# Patient Record
Sex: FEMALE | Race: WHITE | Employment: FULL TIME | URBAN - NONMETROPOLITAN AREA
[De-identification: names, ages, dates, MRNs, and addresses within clinical notes are randomized per-mention and may not be internally consistent; named-entity substitution may affect disease eponyms.]

---

## 2017-01-29 ENCOUNTER — OFFICE VISIT (OUTPATIENT)
Dept: URGENT CARE | Age: 42
End: 2017-01-29
Payer: COMMERCIAL

## 2017-01-29 VITALS
SYSTOLIC BLOOD PRESSURE: 110 MMHG | WEIGHT: 177 LBS | TEMPERATURE: 99.4 F | DIASTOLIC BLOOD PRESSURE: 75 MMHG | HEIGHT: 65 IN | OXYGEN SATURATION: 99 % | RESPIRATION RATE: 18 BRPM | BODY MASS INDEX: 29.49 KG/M2 | HEART RATE: 83 BPM

## 2017-01-29 DIAGNOSIS — J30.2 SEASONAL ALLERGIC RHINITIS, UNSPECIFIED ALLERGIC RHINITIS TRIGGER: Primary | ICD-10-CM

## 2017-01-29 PROCEDURE — 99213 OFFICE O/P EST LOW 20 MIN: CPT | Performed by: FAMILY MEDICINE

## 2017-01-29 PROCEDURE — 96372 THER/PROPH/DIAG INJ SC/IM: CPT | Performed by: FAMILY MEDICINE

## 2017-01-29 RX ORDER — PREDNISONE 20 MG/1
TABLET ORAL
Qty: 18 TABLET | Refills: 0 | Status: SHIPPED | OUTPATIENT
Start: 2017-01-29 | End: 2018-08-03

## 2017-01-29 RX ORDER — DEXAMETHASONE SODIUM PHOSPHATE 100 MG/10ML
10 INJECTION INTRAMUSCULAR; INTRAVENOUS ONCE
Status: COMPLETED | OUTPATIENT
Start: 2017-01-29 | End: 2017-01-29

## 2017-01-29 RX ADMIN — DEXAMETHASONE SODIUM PHOSPHATE 10 MG: 100 INJECTION INTRAMUSCULAR; INTRAVENOUS at 12:19

## 2017-05-06 ENCOUNTER — EMPLOYEE WELLNESS (OUTPATIENT)
Dept: OTHER | Age: 42
End: 2017-05-06

## 2017-05-06 LAB
CHOLESTEROL, TOTAL: 185 MG/DL (ref 160–199)
GLUCOSE BLD-MCNC: 92 MG/DL (ref 74–109)
HDLC SERPL-MCNC: 45 MG/DL (ref 65–121)
LDL CHOLESTEROL CALCULATED: 108 MG/DL
TRIGL SERPL-MCNC: 161 MG/DL (ref 150–199)

## 2017-05-10 ENCOUNTER — HOSPITAL ENCOUNTER (OUTPATIENT)
Dept: WOMENS IMAGING | Age: 42
Discharge: HOME | End: 2017-05-10
Payer: COMMERCIAL

## 2017-05-10 DIAGNOSIS — Z12.31 ENCOUNTER FOR SCREENING MAMMOGRAM FOR MALIGNANT NEOPLASM OF BREAST: ICD-10-CM

## 2017-05-10 PROCEDURE — 77063 BREAST TOMOSYNTHESIS BI: CPT

## 2018-01-18 ENCOUNTER — OFFICE VISIT (OUTPATIENT)
Dept: PRIMARY CARE CLINIC | Age: 43
End: 2018-01-18
Payer: COMMERCIAL

## 2018-01-18 VITALS
BODY MASS INDEX: 30.46 KG/M2 | OXYGEN SATURATION: 98 % | TEMPERATURE: 97.4 F | WEIGHT: 182.8 LBS | DIASTOLIC BLOOD PRESSURE: 78 MMHG | HEIGHT: 65 IN | HEART RATE: 89 BPM | SYSTOLIC BLOOD PRESSURE: 122 MMHG

## 2018-01-18 DIAGNOSIS — J32.0 CHRONIC MAXILLARY SINUSITIS: ICD-10-CM

## 2018-01-18 DIAGNOSIS — E03.9 HYPOTHYROIDISM, UNSPECIFIED TYPE: ICD-10-CM

## 2018-01-18 DIAGNOSIS — E03.9 HYPOTHYROIDISM, UNSPECIFIED TYPE: Primary | ICD-10-CM

## 2018-01-18 DIAGNOSIS — F32.A DEPRESSION, UNSPECIFIED DEPRESSION TYPE: ICD-10-CM

## 2018-01-18 DIAGNOSIS — F41.9 ANXIETY: ICD-10-CM

## 2018-01-18 DIAGNOSIS — J30.89 CHRONIC NON-SEASONAL ALLERGIC RHINITIS, UNSPECIFIED TRIGGER: ICD-10-CM

## 2018-01-18 LAB
T4 FREE: 0.9 NG/DL (ref 0.9–1.7)
TSH SERPL DL<=0.05 MIU/L-ACNC: 1.05 UIU/ML (ref 0.27–4.2)

## 2018-01-18 PROCEDURE — 96372 THER/PROPH/DIAG INJ SC/IM: CPT | Performed by: NURSE PRACTITIONER

## 2018-01-18 PROCEDURE — 99204 OFFICE O/P NEW MOD 45 MIN: CPT | Performed by: NURSE PRACTITIONER

## 2018-01-18 RX ORDER — DEXAMETHASONE SODIUM PHOSPHATE 4 MG/ML
4 INJECTION, SOLUTION INTRA-ARTICULAR; INTRALESIONAL; INTRAMUSCULAR; INTRAVENOUS; SOFT TISSUE ONCE
Qty: 1 ML | Refills: 0
Start: 2018-01-18 | End: 2018-01-18

## 2018-01-18 RX ORDER — MONTELUKAST SODIUM 10 MG/1
10 TABLET ORAL NIGHTLY
Qty: 30 TABLET | Refills: 5 | Status: SHIPPED | OUTPATIENT
Start: 2018-01-18 | End: 2018-09-28 | Stop reason: SDUPTHER

## 2018-01-18 RX ORDER — MONTELUKAST SODIUM 10 MG/1
10 TABLET ORAL DAILY
Qty: 30 TABLET | Refills: 2 | Status: SHIPPED | OUTPATIENT
Start: 2018-01-18 | End: 2018-01-18 | Stop reason: CLARIF

## 2018-01-18 RX ORDER — CEFTRIAXONE 1 G/1
1 INJECTION, POWDER, FOR SOLUTION INTRAMUSCULAR; INTRAVENOUS ONCE
Status: COMPLETED | OUTPATIENT
Start: 2018-01-18 | End: 2018-01-18

## 2018-01-18 RX ORDER — CEFUROXIME AXETIL 500 MG/1
500 TABLET ORAL 2 TIMES DAILY
Qty: 20 TABLET | Refills: 0 | Status: SHIPPED | OUTPATIENT
Start: 2018-01-18 | End: 2018-01-28

## 2018-01-18 RX ORDER — METHYLPREDNISOLONE ACETATE 40 MG/ML
40 INJECTION, SUSPENSION INTRA-ARTICULAR; INTRALESIONAL; INTRAMUSCULAR; SOFT TISSUE ONCE
Qty: 1 ML | Refills: 0
Start: 2018-01-18 | End: 2018-01-18

## 2018-01-18 RX ORDER — MONTELUKAST SODIUM 10 MG/1
10 TABLET ORAL NIGHTLY
Qty: 30 TABLET | Refills: 0 | Status: SHIPPED | OUTPATIENT
Start: 2018-01-18 | End: 2018-01-18 | Stop reason: CLARIF

## 2018-01-18 RX ORDER — PSEUDOEPHEDRINE HCL 120 MG/1
120 TABLET, FILM COATED, EXTENDED RELEASE ORAL EVERY 12 HOURS
Qty: 60 TABLET | Refills: 0 | Status: SHIPPED | OUTPATIENT
Start: 2018-01-18 | End: 2020-04-03 | Stop reason: SDUPTHER

## 2018-01-18 RX ORDER — METHYLPREDNISOLONE 4 MG/1
TABLET ORAL
Qty: 1 KIT | Refills: 0 | Status: SHIPPED | OUTPATIENT
Start: 2018-01-18 | End: 2018-01-24

## 2018-01-18 RX ADMIN — DEXAMETHASONE SODIUM PHOSPHATE 4 MG: 4 INJECTION, SOLUTION INTRA-ARTICULAR; INTRALESIONAL; INTRAMUSCULAR; INTRAVENOUS; SOFT TISSUE at 14:30

## 2018-01-18 RX ADMIN — DEXAMETHASONE SODIUM PHOSPHATE 4 MG: 4 INJECTION, SOLUTION INTRA-ARTICULAR; INTRALESIONAL; INTRAMUSCULAR; INTRAVENOUS; SOFT TISSUE at 13:46

## 2018-01-18 RX ADMIN — CEFTRIAXONE 1 G: 1 INJECTION, POWDER, FOR SOLUTION INTRAMUSCULAR; INTRAVENOUS at 14:15

## 2018-01-18 RX ADMIN — METHYLPREDNISOLONE ACETATE 40 MG: 40 INJECTION, SUSPENSION INTRA-ARTICULAR; INTRALESIONAL; INTRAMUSCULAR; SOFT TISSUE at 14:30

## 2018-01-18 ASSESSMENT — PATIENT HEALTH QUESTIONNAIRE - PHQ9
1. LITTLE INTEREST OR PLEASURE IN DOING THINGS: 0
SUM OF ALL RESPONSES TO PHQ QUESTIONS 1-9: 0
SUM OF ALL RESPONSES TO PHQ9 QUESTIONS 1 & 2: 0
2. FEELING DOWN, DEPRESSED OR HOPELESS: 0

## 2018-01-18 NOTE — PATIENT INSTRUCTIONS
Sudafed 120 mg twice a day as directed. Medrol dose pack as directed. Ceftin 500mg twice a day as directed.

## 2018-01-19 RX ORDER — DEXAMETHASONE SODIUM PHOSPHATE 4 MG/ML
4 INJECTION, SOLUTION INTRA-ARTICULAR; INTRALESIONAL; INTRAMUSCULAR; INTRAVENOUS; SOFT TISSUE ONCE
Status: COMPLETED | OUTPATIENT
Start: 2018-01-18 | End: 2018-01-18

## 2018-01-19 RX ORDER — METHYLPREDNISOLONE ACETATE 40 MG/ML
40 INJECTION, SUSPENSION INTRA-ARTICULAR; INTRALESIONAL; INTRAMUSCULAR; SOFT TISSUE ONCE
Status: COMPLETED | OUTPATIENT
Start: 2018-01-18 | End: 2018-01-18

## 2018-01-20 LAB — T3 TOTAL: 85 NG/DL (ref 80–200)

## 2018-01-30 ASSESSMENT — ENCOUNTER SYMPTOMS
CONSTIPATION: 0
COUGH: 0
EYE REDNESS: 1
VOMITING: 0
DIARRHEA: 0
BLOOD IN STOOL: 0
NAUSEA: 0
CHEST TIGHTNESS: 0
WHEEZING: 1
SINUS PAIN: 1
SORE THROAT: 1
ABDOMINAL PAIN: 0
RHINORRHEA: 1
TROUBLE SWALLOWING: 0
SHORTNESS OF BREATH: 1
SINUS PRESSURE: 1
VOICE CHANGE: 0

## 2018-01-30 NOTE — PROGRESS NOTES
[facility] PRIMARY CARE  [address]  [address] [place] [address]  Dept: [phone]  Dept Fax: [phone]  Loc: [phone]    Nila Fischer is a 44 y.o. female who presents today for her medical conditions/complaints as noted below. Nila Fischer is c/o of New Patient      Chief Complaint   Patient presents with    New Patient       HPI:       HPI    Pt here to establish care for chronic conditions of hypothyroidism, chronic severe allergic rhinitis, depression and anxiety. Pt states that she doesn't feel that her thyroid medication has helped her much and she cannot tell that she is even taking it. Pt states that she has been on this dose for quite awhile. Pt states that she used to take synthroid and cytomel. Pt is questioning other therapies available to treat her hypothyroidism. Pt presents with sever allergic rhinitis and sinusitis. Pt states that she has been on several different oral medications, nasal sprays and has even taken shots in the past. Pt presents with severe runny nose, itchy eyes, sinus pressure and pain, post nasal drip. Pt states that she is stable on depression/ anxiety medication trintellix. She wishes to continue same. Pt denies SI or HI.     Past Medical History:   Diagnosis Date    Anxiety     Asthma     Hypothyroid 6/24/2013    Thyroid disorder     Yeast infection of nipple, postpartum 3/20/2014        Past Surgical History:   Procedure Laterality Date    LAPAROSCOPY  06/11/2015    LIGAMENT REPAIR Right 12/29/2015    RIGHT ANKLE BROSTROM-LENTZ  performed by Harsh Pacheco MD at [address] [place] [address] PARTIAL HYSTERECTOMY  7/13/15    [facility] ALEJANDRE AND BSO      TUBAL LIGATION  3/14    TUBAL LIGATION      WISDOM TOOTH EXTRACTION  2011       Social History   Substance Use Topics    Smoking status: Never Smoker    Smokeless tobacco: Never Used    Alcohol use No        Current Outpatient Prescriptions   Medication Sig Dispense Refill    pseudoephedrine (GNP PSEUDOEPHEDRINE HCL 12 HR) 120 MG extended release tablet Take 1 tablet by mouth every 12 hours 60 tablet 0    VORTIoxetine (TRINTELLIX) 10 MG TABS tablet Take 10 mg by mouth daily 30 tablet 5    montelukast (SINGULAIR) 10 MG tablet Take 1 tablet by mouth nightly 30 tablet 5    predniSONE (DELTASONE) 20 MG tablet 3 tablets daily for 3 days, 2 tablets daily for 3 days, 1 tablet daily for 3 days 18 tablet 0    Cetirizine HCl (ZYRTEC ALLERGY) 10 MG CAPS Take by mouth      levothyroxine (SYNTHROID) 25 MCG tablet Take 50 mcg by mouth Daily Indications: Underactive Thyroid       Budesonide (PULMICORT IN) Inhale 2 puffs into the lungs 2 times daily Indications: Asthma        No current facility-administered medications for this visit. Allergies   Allergen Reactions    Adhesive Tape      blisters         Subjective:      Review of Systems   Constitutional: Positive for fatigue and fever. Negative for activity change, appetite change and unexpected weight change. HENT: Positive for ear pain, rhinorrhea, sinus pain, sinus pressure, sneezing and sore throat. Negative for congestion, nosebleeds, trouble swallowing and voice change. Eyes: Positive for redness. Negative for visual disturbance. Respiratory: Positive for shortness of breath and wheezing. Negative for cough and chest tightness. Cardiovascular: Negative for chest pain, palpitations and leg swelling. Gastrointestinal: Negative for abdominal pain, blood in stool, constipation, diarrhea, nausea and vomiting. Endocrine: Negative for polydipsia, polyphagia and polyuria. Genitourinary: Negative for dysuria, frequency and urgency. Musculoskeletal: Negative for myalgias. Skin: Negative for rash and wound. Neurological: Negative for dizziness, speech difficulty, light-headedness and headaches. Psychiatric/Behavioral: Negative for agitation, confusion, self-injury and suicidal ideas.  The patient is not nervous/anxious. Objective:     Physical Exam   Constitutional: She is oriented to person, place, and time. She appears well-developed and well-nourished. No distress. HENT:   Head: Normocephalic and atraumatic. Right Ear: External ear normal. A middle ear effusion is present. Left Ear: External ear normal. A middle ear effusion is present. Nose: Right sinus exhibits maxillary sinus tenderness and frontal sinus tenderness. Left sinus exhibits maxillary sinus tenderness and frontal sinus tenderness. Mouth/Throat: Posterior oropharyngeal erythema present. No oropharyngeal exudate. Eyes: Conjunctivae are normal. Pupils are equal, round, and reactive to light. Right eye exhibits no discharge. Left eye exhibits no discharge. Neck: Normal range of motion. Neck supple. Cardiovascular: Normal rate, regular rhythm, normal heart sounds and intact distal pulses. No murmur heard. Pulmonary/Chest: Effort normal and breath sounds normal. No stridor. No respiratory distress. She has no wheezes. She has no rales. She exhibits no tenderness. Abdominal: Soft. Bowel sounds are normal. She exhibits no distension. There is no tenderness. Musculoskeletal: Normal range of motion. She exhibits no edema, tenderness or deformity. Neurological: She is alert and oriented to person, place, and time. She has normal reflexes. No cranial nerve deficit. Coordination normal.   Skin: Skin is warm and dry. No rash noted. She is not diaphoretic. No erythema. Psychiatric: She has a normal mood and affect. Her behavior is normal. Thought content normal.   Nursing note and vitals reviewed. /78   Pulse 89   Temp 97.4 °F (36.3 °C)   Ht 5' 5\" (1.651 m)   Wt 182 lb 12.8 oz (82.9 kg)   LMP 05/12/2015 (Approximate)   SpO2 98%   BMI 30.42 kg/m²       Assessment:     1. Hypothyroidism, unspecified type  T4, Free    TSH without Reflex    T3   2.  Chronic non-seasonal allergic rhinitis, unspecified trigger

## 2018-02-11 DIAGNOSIS — M19.90 ARTHRITIS: Primary | ICD-10-CM

## 2018-02-16 DIAGNOSIS — R30.0 DYSURIA: ICD-10-CM

## 2018-02-16 DIAGNOSIS — R30.0 DYSURIA: Primary | ICD-10-CM

## 2018-02-16 RX ORDER — PHENAZOPYRIDINE HYDROCHLORIDE 200 MG/1
200 TABLET, FILM COATED ORAL 3 TIMES DAILY PRN
Qty: 9 TABLET | Refills: 3 | Status: SHIPPED | OUTPATIENT
Start: 2018-02-16 | End: 2018-02-19

## 2018-02-16 RX ORDER — SULFAMETHOXAZOLE AND TRIMETHOPRIM 800; 160 MG/1; MG/1
1 TABLET ORAL 2 TIMES DAILY
Qty: 20 TABLET | Refills: 0 | Status: SHIPPED | OUTPATIENT
Start: 2018-02-16 | End: 2018-02-26

## 2018-02-18 LAB — URINE CULTURE, ROUTINE: NORMAL

## 2018-02-20 DIAGNOSIS — M19.90 ARTHRITIS: ICD-10-CM

## 2018-03-20 VITALS — WEIGHT: 176 LBS | BODY MASS INDEX: 29.29 KG/M2

## 2018-03-26 ENCOUNTER — PATIENT MESSAGE (OUTPATIENT)
Dept: PRIMARY CARE CLINIC | Age: 43
End: 2018-03-26

## 2018-03-27 DIAGNOSIS — T78.40XD ALLERGIC REACTION, SUBSEQUENT ENCOUNTER: Primary | ICD-10-CM

## 2018-03-27 NOTE — PROGRESS NOTES
Patient stopped me in the nursery requesting a referral to an allergist since her symptoms have been worsening.

## 2018-05-17 ENCOUNTER — PATIENT MESSAGE (OUTPATIENT)
Dept: PRIMARY CARE CLINIC | Age: 43
End: 2018-05-17

## 2018-06-13 DIAGNOSIS — M19.90 ARTHRITIS: ICD-10-CM

## 2018-08-03 ENCOUNTER — OFFICE VISIT (OUTPATIENT)
Dept: PRIMARY CARE CLINIC | Age: 43
End: 2018-08-03
Payer: COMMERCIAL

## 2018-08-03 ENCOUNTER — HOSPITAL ENCOUNTER (OUTPATIENT)
Dept: GENERAL RADIOLOGY | Age: 43
Discharge: HOME | End: 2018-08-03
Payer: COMMERCIAL

## 2018-08-03 VITALS
HEART RATE: 72 BPM | WEIGHT: 172 LBS | TEMPERATURE: 97.6 F | OXYGEN SATURATION: 98 % | BODY MASS INDEX: 28.66 KG/M2 | HEIGHT: 65 IN | SYSTOLIC BLOOD PRESSURE: 124 MMHG | DIASTOLIC BLOOD PRESSURE: 87 MMHG

## 2018-08-03 DIAGNOSIS — G62.9 NEUROPATHY: ICD-10-CM
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
## 2018-08-03 DIAGNOSIS — R25.2 LEG CRAMPS: ICD-10-CM

## 2018-08-03 DIAGNOSIS — M79.642 LEFT HAND PAIN: ICD-10-CM

## 2018-08-03 DIAGNOSIS — M79.604 RIGHT LEG PAIN: ICD-10-CM

## 2018-08-03 DIAGNOSIS — M79.671 RIGHT FOOT PAIN: ICD-10-CM

## 2018-08-03 DIAGNOSIS — S69.91XA INJURY OF RIGHT HAND, INITIAL ENCOUNTER: ICD-10-CM

## 2018-08-03 DIAGNOSIS — S92.191A OTHER FRACTURE OF RIGHT TALUS, INITIAL ENCOUNTER FOR CLOSED FRACTURE: ICD-10-CM

## 2018-08-03 DIAGNOSIS — M79.604 RIGHT LEG PAIN: Primary | ICD-10-CM

## 2018-08-03 DIAGNOSIS — M19.90 ARTHRITIS: ICD-10-CM

## 2018-08-03 DIAGNOSIS — R53.83 OTHER FATIGUE: ICD-10-CM

## 2018-08-03 LAB
ALBUMIN SERPL-MCNC: 4.3 G/DL (ref 3.5–5.2)
ALP BLD-CCNC: 73 U/L (ref 35–104)
ALT SERPL-CCNC: 17 U/L (ref 5–33)
ANION GAP SERPL CALCULATED.3IONS-SCNC: 14 MMOL/L (ref 7–19)
AST SERPL-CCNC: 16 U/L (ref 5–32)
BASOPHILS ABSOLUTE: 0.1 K/UL (ref 0–0.2)
BASOPHILS RELATIVE PERCENT: 1 % (ref 0–1)
BILIRUB SERPL-MCNC: 0.3 MG/DL (ref 0.2–1.2)
BUN BLDV-MCNC: 14 MG/DL (ref 6–20)
CALCIUM SERPL-MCNC: 9.4 MG/DL (ref 8.6–10)
CHLORIDE BLD-SCNC: 103 MMOL/L (ref 98–111)
CO2: 22 MMOL/L (ref 22–29)
CREAT SERPL-MCNC: 0.5 MG/DL (ref 0.5–0.9)
EOSINOPHILS ABSOLUTE: 0.7 K/UL (ref 0–0.6)
EOSINOPHILS RELATIVE PERCENT: 6.5 % (ref 0–5)
GFR NON-AFRICAN AMERICAN: >60
GLUCOSE BLD-MCNC: 83 MG/DL (ref 74–109)
HCT VFR BLD CALC: 42.6 % (ref 37–47)
HEMOGLOBIN: 13.5 G/DL (ref 12–16)
LYMPHOCYTES ABSOLUTE: 3.2 K/UL (ref 1.1–4.5)
LYMPHOCYTES RELATIVE PERCENT: 32.3 % (ref 20–40)
MCH RBC QN AUTO: 29.3 PG (ref 27–31)
MCHC RBC AUTO-ENTMCNC: 31.7 G/DL (ref 33–37)
MCV RBC AUTO: 92.6 FL (ref 81–99)
MONOCYTES ABSOLUTE: 0.9 K/UL (ref 0–0.9)
MONOCYTES RELATIVE PERCENT: 8.7 % (ref 0–10)
NEUTROPHILS ABSOLUTE: 5.1 K/UL (ref 1.5–7.5)
NEUTROPHILS RELATIVE PERCENT: 50.9 % (ref 50–65)
PDW BLD-RTO: 12.3 % (ref 11.5–14.5)
PLATELET # BLD: 345 K/UL (ref 130–400)
PMV BLD AUTO: 9.4 FL (ref 9.4–12.3)
POTASSIUM SERPL-SCNC: 4.5 MMOL/L (ref 3.5–5)
RBC # BLD: 4.6 M/UL (ref 4.2–5.4)
SODIUM BLD-SCNC: 139 MMOL/L (ref 136–145)
TOTAL PROTEIN: 7.3 G/DL (ref 6.6–8.7)
TSH SERPL DL<=0.05 MIU/L-ACNC: 1 UIU/ML (ref 0.27–4.2)
VITAMIN B-12: 648 PG/ML (ref 211–946)
WBC # BLD: 10 K/UL (ref 4.8–10.8)

## 2018-08-03 PROCEDURE — 73130 X-RAY EXAM OF HAND: CPT

## 2018-08-03 PROCEDURE — 73590 X-RAY EXAM OF LOWER LEG: CPT

## 2018-08-03 PROCEDURE — 99214 OFFICE O/P EST MOD 30 MIN: CPT | Performed by: NURSE PRACTITIONER

## 2018-08-03 PROCEDURE — 73630 X-RAY EXAM OF FOOT: CPT

## 2018-08-03 RX ORDER — CEPHALEXIN 500 MG/1
500 CAPSULE ORAL 4 TIMES DAILY
Qty: 40 CAPSULE | Refills: 0 | Status: SHIPPED | OUTPATIENT
Start: 2018-08-03 | End: 2018-08-13

## 2018-08-03 NOTE — PROGRESS NOTES
[facility] PRIMARY CARE  [address]  [address] [facility] [address]  Dept: [phone]  Dept Fax: [phone]  Loc: [phone]        Gio Garcia is a 44 y.o. female who presents today for her medical conditions/ complaints as noted below. Gio Garcia is c/o Hand Pain (L hand); Foot Pain (R foot, great toe numb); and Leg Pain (R leg)        Chief Complaint   Patient presents with    Hand Pain     L hand    Foot Pain     R foot, great toe numb    Leg Pain     R leg       HPI:     HPI    Pt c/o right leg and right foot pain. Pt states that it has been occurring for a while. Pt states that she had surgery in 2015. Pt states that it hurts for her to walk and her toes will go numb at times. Pt denies erythema or edema. Pt does c/o muscle cramps in her legs and neuropathy. Pt states that she also suffers from fatigue. Pt also c/o left hand pain. Pt states that she got bit by a dog a few months ago. She states that since then she developed chronic left hand pain. Pt states that the dog did not break the skin but it hurt so bad when it occurred. She states that she has now developed a knot inside of her hand that she can feel on both sides of her hand. Pt states that her allergies are under control since Dr. Roxanna Pepe began her on zyrtec 20mg in the morning and 20mg at bedtime. Patient reports that they have been compliant with taking medications as directed.      Past Medical History:   Diagnosis Date    Anxiety     Asthma     Hypothyroid 6/24/2013    Thyroid disorder     Yeast infection of nipple, postpartum 3/20/2014       Past Surgical History:   Procedure Laterality Date    LAPAROSCOPY  06/11/2015    LIGAMENT REPAIR Right 12/29/2015    RIGHT ANKLE BROSTROM-LENTZ  performed by Nicole Pagan MD at [address] [place] [address]  7/13/15    Cristy Hodge    [address]      TUBAL LIGATION  3/14    TUBAL LIGATION     [address] [facility]  2011 Social History     Social History    Marital status:      Spouse name: N/A    Number of children: N/A    Years of education: N/A     Social History Main Topics    Smoking status: Never Smoker    Smokeless tobacco: Never Used    Alcohol use No    Drug use: No    Sexual activity: Yes     Partners: Male     Other Topics Concern    None     Social History Narrative    None       Family History   Problem Relation Age of Onset    Heart Disease Mother     Diabetes Mother     Cancer Father     High Cholesterol Father        Current Outpatient Prescriptions   Medication Sig Dispense Refill    RaNITidine HCl (ZANTAC PO) Take by mouth 2 times daily      Vortioxetine HBr (TRINTELLIX PO) Take by mouth Daily      diclofenac sodium (VOLTAREN) 1 % GEL Apply 4 g topically 4 times daily 5 Tube 0    cephALEXin (KEFLEX) 500 MG capsule Take 1 capsule by mouth 4 times daily for 10 days 40 capsule 0    piroxicam (FELDENE) 20 MG capsule TAKE ONE CAPSULE BY MOUTH ONE TIME A DAY 30 capsule 3    montelukast (SINGULAIR) 10 MG tablet Take 1 tablet by mouth nightly 30 tablet 5    Cetirizine HCl (ZYRTEC ALLERGY) 10 MG CAPS Take 10 mg by mouth 2 times daily       levothyroxine (SYNTHROID) 25 MCG tablet Take 50 mcg by mouth Daily Indications: Underactive Thyroid       Budesonide (PULMICORT IN) Inhale 2 puffs into the lungs 2 times daily Indications: Asthma        No current facility-administered medications for this visit. Allergies   Allergen Reactions    Adhesive Tape      blisters    Molds & Smuts Dermatitis, Hives, Itching, Rash, Shortness Of Breath and Swelling    Pollen Extract Dermatitis, Hives, Itching, Rash, Shortness Of Breath and Swelling    Wasp Venom Hives, Itching, Nausea And Vomiting and Swelling       Lab Review not applicable    Subjective:   Review of Systems   Constitutional: Positive for fatigue. Negative for activity change, appetite change, fever and unexpected weight change. HENT: Negative for congestion, ear pain, nosebleeds, rhinorrhea, sore throat, trouble swallowing and voice change. Eyes: Negative for redness and visual disturbance. Respiratory: Negative for cough, chest tightness, shortness of breath and wheezing. Cardiovascular: Negative for chest pain, palpitations and leg swelling. Gastrointestinal: Negative for abdominal pain, blood in stool, constipation, diarrhea, nausea and vomiting. Endocrine: Negative for polydipsia, polyphagia and polyuria. Genitourinary: Negative for dysuria, frequency and urgency. Musculoskeletal: Positive for arthralgias and myalgias. Left hand and right foot pain   Skin: Negative for rash and wound. Neurological: Negative for dizziness, speech difficulty, light-headedness and headaches. Psychiatric/Behavioral: Negative for agitation, confusion, self-injury and suicidal ideas. The patient is not nervous/anxious. Objective:     Physical Exam   Constitutional: She is oriented to person, place, and time. She appears well-developed and well-nourished. No distress. HENT:   Head: Normocephalic and atraumatic. Right Ear: External ear normal.   Left Ear: External ear normal.   Nose: Nose normal.   Mouth/Throat: Oropharynx is clear and moist. No oropharyngeal exudate. Eyes: Conjunctivae are normal. Pupils are equal, round, and reactive to light. Right eye exhibits no discharge. Left eye exhibits no discharge. Neck: Normal range of motion. Neck supple. Cardiovascular: Normal rate, regular rhythm, normal heart sounds and intact distal pulses. No murmur heard. Pulmonary/Chest: Effort normal and breath sounds normal. No stridor. No respiratory distress. She has no wheezes. She has no rales. She exhibits no tenderness. Right breast exhibits no inverted nipple, no mass, no nipple discharge, no skin change and no tenderness.  Left breast exhibits no inverted nipple, no mass, no nipple discharge, no skin change and no Placed This Encounter   Procedures    XR FOOT RIGHT (MIN 3 VIEWS)     Standing Status:   Future     Number of Occurrences:   1     Standing Expiration Date:   8/3/2019     Order Specific Question:   Reason for exam:     Answer:   right dorsal pain    XR TIBIA FIBULA RIGHT (2 VIEWS)     Standing Status:   Future     Number of Occurrences:   1     Standing Expiration Date:   8/3/2019     Order Specific Question:   Reason for exam:     Answer:   right lower leg pain,  lower medial area.  XR HAND LEFT (MIN 3 VIEWS)     Standing Status:   Future     Number of Occurrences:   1     Standing Expiration Date:   8/3/2019     Order Specific Question:   Reason for exam:     Answer:   dog bite and persistant swelling. pain    CBC Auto Differential     Standing Status:   Future     Number of Occurrences:   1     Standing Expiration Date:   9/7/2019    Comprehensive Metabolic Panel     Standing Status:   Future     Number of Occurrences:   1     Standing Expiration Date:   9/7/2019    Vitamin B12     Standing Status:   Future     Number of Occurrences:   1     Standing Expiration Date:   9/7/2019    TSH without Reflex     Standing Status:   Future     Number of Occurrences:   1     Standing Expiration Date:   9/7/2019    Vitamin D 25 Hydrox, D2 & D3     Standing Status:   Future     Number of Occurrences:   1     Standing Expiration Date:   9/7/2019    External Referral To Orthopedic Surgery     Referral Priority:   Routine     Referral Type:   Eval and Treat     Referral Reason:   Specialty Services Required     Requested Specialty:   Orthopedic Surgery     Number of Visits Requested:   1    [facility] [address] 66 Walking Boot     Patient was prescribed a [employer]. The right  foot/ankle will require stabilization / immobilization from this semi-rigid / rigid orthosis to improve their function.   The orthosis will assist in protecting the affected area, provide functional support and facilitate

## 2018-08-05 ASSESSMENT — ENCOUNTER SYMPTOMS
ABDOMINAL PAIN: 0
DIARRHEA: 0
BLOOD IN STOOL: 0
RHINORRHEA: 0
TROUBLE SWALLOWING: 0
EYE REDNESS: 0
WHEEZING: 0
COUGH: 0
SORE THROAT: 0
CONSTIPATION: 0
SHORTNESS OF BREATH: 0
VOICE CHANGE: 0
CHEST TIGHTNESS: 0
VOMITING: 0
NAUSEA: 0

## 2018-08-06 ENCOUNTER — TELEPHONE (OUTPATIENT)
Dept: PRIMARY CARE CLINIC | Age: 43
End: 2018-08-06

## 2018-08-08 ENCOUNTER — TELEPHONE (OUTPATIENT)
Dept: PRIMARY CARE CLINIC | Age: 43
End: 2018-08-08

## 2018-08-08 LAB
VITAMIN D2 AND D3, TOTAL: 44.7 NG/ML (ref 30–80)
VITAMIN D2, 25 HYDROXY: 1.6 NG/ML
VITAMIN D3,25 HYDROXY: 43.1 NG/ML

## 2018-08-08 NOTE — TELEPHONE ENCOUNTER
CBC Auto Differential   Order: [id number]   Status:  Final result   Visible to patient:  Yes (MyChart) Dx:  Leg cramps   Notes recorded by PELON Hartman on 8/3/2018 at 12:47 PM CDT  Normal Results. Contacted pt with above results. Pt verbalized understanding.  PP, LPN

## 2018-08-09 ENCOUNTER — TELEPHONE (OUTPATIENT)
Dept: PRIMARY CARE CLINIC | Age: 43
End: 2018-08-09

## 2018-09-07 RX ORDER — VORTIOXETINE 10 MG/1
TABLET, FILM COATED ORAL
Qty: 30 TABLET | Refills: 5 | Status: SHIPPED | OUTPATIENT
Start: 2018-09-07 | End: 2019-02-06

## 2018-09-28 ENCOUNTER — TELEPHONE (OUTPATIENT)
Dept: PRIMARY CARE CLINIC | Age: 43
End: 2018-09-28

## 2018-09-28 ENCOUNTER — OFFICE VISIT (OUTPATIENT)
Dept: PRIMARY CARE CLINIC | Age: 43
End: 2018-09-28
Payer: COMMERCIAL

## 2018-09-28 VITALS
OXYGEN SATURATION: 98 % | TEMPERATURE: 97.8 F | HEART RATE: 83 BPM | BODY MASS INDEX: 29.22 KG/M2 | HEIGHT: 65 IN | SYSTOLIC BLOOD PRESSURE: 118 MMHG | DIASTOLIC BLOOD PRESSURE: 68 MMHG | WEIGHT: 175.4 LBS

## 2018-09-28 DIAGNOSIS — M54.2 NECK PAIN: ICD-10-CM

## 2018-09-28 DIAGNOSIS — R20.2 NUMBNESS AND TINGLING IN LEFT ARM: ICD-10-CM

## 2018-09-28 DIAGNOSIS — R20.0 NUMBNESS AND TINGLING IN LEFT ARM: ICD-10-CM

## 2018-09-28 DIAGNOSIS — R20.0 NUMBNESS AND TINGLING OF LEFT SIDE OF FACE: ICD-10-CM

## 2018-09-28 DIAGNOSIS — J32.0 CHRONIC MAXILLARY SINUSITIS: ICD-10-CM

## 2018-09-28 DIAGNOSIS — R20.2 NUMBNESS AND TINGLING OF LEFT SIDE OF FACE: ICD-10-CM

## 2018-09-28 DIAGNOSIS — R20.2 NUMBNESS AND TINGLING OF RIGHT ARM: ICD-10-CM

## 2018-09-28 DIAGNOSIS — R20.0 NUMBNESS AND TINGLING OF RIGHT ARM: ICD-10-CM

## 2018-09-28 DIAGNOSIS — R20.0 NUMBNESS AND TINGLING OF RIGHT ARM: Primary | ICD-10-CM

## 2018-09-28 DIAGNOSIS — R20.2 NUMBNESS AND TINGLING OF RIGHT ARM: Primary | ICD-10-CM

## 2018-09-28 LAB
ANION GAP SERPL CALCULATED.3IONS-SCNC: 11 MMOL/L (ref 7–19)
BUN BLDV-MCNC: 12 MG/DL (ref 6–20)
CALCIUM SERPL-MCNC: 9.4 MG/DL (ref 8.6–10)
CHLORIDE BLD-SCNC: 105 MMOL/L (ref 98–111)
CO2: 24 MMOL/L (ref 22–29)
CREAT SERPL-MCNC: 0.5 MG/DL (ref 0.5–0.9)
GFR NON-AFRICAN AMERICAN: >60
GLUCOSE BLD-MCNC: 90 MG/DL (ref 74–109)
HCT VFR BLD CALC: 41 % (ref 37–47)
HEMOGLOBIN: 13.2 G/DL (ref 12–16)
MAGNESIUM: 2.1 MG/DL (ref 1.6–2.6)
MCH RBC QN AUTO: 29.7 PG (ref 27–31)
MCHC RBC AUTO-ENTMCNC: 32.2 G/DL (ref 33–37)
MCV RBC AUTO: 92.1 FL (ref 81–99)
PDW BLD-RTO: 12.4 % (ref 11.5–14.5)
PLATELET # BLD: 337 K/UL (ref 130–400)
PMV BLD AUTO: 9.2 FL (ref 9.4–12.3)
POTASSIUM SERPL-SCNC: 4.4 MMOL/L (ref 3.5–5)
RBC # BLD: 4.45 M/UL (ref 4.2–5.4)
SODIUM BLD-SCNC: 140 MMOL/L (ref 136–145)
WBC # BLD: 9.5 K/UL (ref 4.8–10.8)

## 2018-09-28 PROCEDURE — 99213 OFFICE O/P EST LOW 20 MIN: CPT | Performed by: NURSE PRACTITIONER

## 2018-09-28 RX ORDER — TIZANIDINE 4 MG/1
2 TABLET ORAL EVERY 8 HOURS PRN
Qty: 30 TABLET | Refills: 0 | Status: SHIPPED | OUTPATIENT
Start: 2018-09-28 | End: 2018-10-13

## 2018-09-28 RX ORDER — NAPROXEN 500 MG/1
500 TABLET ORAL
Qty: 30 TABLET | Refills: 0 | Status: SHIPPED | OUTPATIENT
Start: 2018-09-28 | End: 2019-01-08 | Stop reason: ALTCHOICE

## 2018-09-28 RX ORDER — MONTELUKAST SODIUM 10 MG/1
10 TABLET ORAL NIGHTLY
Qty: 30 TABLET | Refills: 5 | Status: SHIPPED | OUTPATIENT
Start: 2018-09-28 | End: 2019-02-06 | Stop reason: SDUPTHER

## 2018-09-28 RX ORDER — LEVOTHYROXINE SODIUM 0.03 MG/1
50 TABLET ORAL DAILY
Qty: 30 TABLET | Refills: 3 | Status: SHIPPED | OUTPATIENT
Start: 2018-09-28 | End: 2018-09-28 | Stop reason: CLARIF

## 2018-09-28 ASSESSMENT — ENCOUNTER SYMPTOMS
ABDOMINAL PAIN: 0
DIARRHEA: 0
SHORTNESS OF BREATH: 0
BACK PAIN: 0
WHEEZING: 0
VOMITING: 0
COUGH: 0
NAUSEA: 0

## 2018-09-28 NOTE — PROGRESS NOTES
TRINTELLIX 10 MG TABS tablet TAKE ONE TABLET BY MOUTH EVERY DAY 30 tablet 5    RaNITidine HCl (ZANTAC PO) Take by mouth 2 times daily      Vortioxetine HBr (TRINTELLIX PO) Take by mouth Daily      piroxicam (FELDENE) 20 MG capsule TAKE ONE CAPSULE BY MOUTH ONE TIME A DAY 30 capsule 3    Cetirizine HCl (ZYRTEC ALLERGY) 10 MG CAPS Take 10 mg by mouth 2 times daily       Budesonide (PULMICORT IN) Inhale 2 puffs into the lungs 2 times daily Indications: Asthma       diclofenac sodium (VOLTAREN) 1 % GEL Apply 4 g topically 4 times daily 5 Tube 0     No current facility-administered medications for this visit. Allergies   Allergen Reactions    Adhesive Tape      blisters    Molds & Smuts Dermatitis, Hives, Itching, Rash, Shortness Of Breath and Swelling    Pollen Extract Dermatitis, Hives, Itching, Rash, Shortness Of Breath and Swelling    Wasp Venom Hives, Itching, Nausea And Vomiting and Swelling       Family History   Problem Relation Age of Onset    Heart Disease Mother     Diabetes Mother     Cancer Father     High Cholesterol Father                Review of Systems   Constitutional: Negative for appetite change, fatigue and fever. Respiratory: Negative for cough, shortness of breath and wheezing. Cardiovascular: Negative for chest pain and leg swelling. Gastrointestinal: Negative for abdominal pain, diarrhea, nausea and vomiting. Genitourinary: Negative for difficulty urinating and dysuria. Musculoskeletal: Positive for neck pain. Negative for arthralgias, back pain and neck stiffness. Skin: Negative for rash and wound. Neurological: Positive for numbness (bilateral lower arms and left face and forehead). Negative for dizziness, weakness and headaches. Psychiatric/Behavioral: The patient is not nervous/anxious. Objective:     Physical Exam   Constitutional: She is oriented to person, place, and time. She appears well-developed and well-nourished.    HENT:   Head: Normocephalic and atraumatic. Eyes: Pupils are equal, round, and reactive to light. Neck: Normal range of motion. Cardiovascular: Normal rate, regular rhythm and normal heart sounds. Pulmonary/Chest: Effort normal and breath sounds normal. No respiratory distress. She has no wheezes. Abdominal: Soft. Bowel sounds are normal. She exhibits no distension. There is no tenderness. Musculoskeletal: She exhibits no edema. Cervical back: She exhibits decreased range of motion and pain (when neck turned to the right). She exhibits no swelling. Lumbar back: She exhibits normal range of motion. Lymphadenopathy:     She has no cervical adenopathy. Neurological: She is alert and oriented to person, place, and time. She has normal strength. She exhibits normal muscle tone. Sensation normal to all areas   Skin: Skin is warm and dry. No rash noted. Psychiatric: She has a normal mood and affect. Her behavior is normal.   Nursing note and vitals reviewed. /68   Pulse 83   Temp 97.8 °F (36.6 °C) (Temporal)   Ht 5' 5\" (1.651 m)   Wt 175 lb 6.4 oz (79.6 kg)   LMP 05/12/2015 (Approximate)   SpO2 98%   BMI 29.19 kg/m²     Assessment:      Diagnosis Orders   1. Chronic maxillary sinusitis  montelukast (SINGULAIR) 10 MG tablet   2. Numbness and tingling of right arm  Basic Metabolic Panel    Magnesium    XR CERVICAL SPINE (2-3 VIEWS)    XR THORACIC SPINE (3 VIEWS)    naproxen (NAPROXEN) 500 MG EC tablet    tiZANidine (ZANAFLEX) 4 MG tablet   3. Numbness and tingling in left arm  Basic Metabolic Panel    Magnesium    XR CERVICAL SPINE (2-3 VIEWS)    XR THORACIC SPINE (3 VIEWS)    naproxen (NAPROXEN) 500 MG EC tablet    tiZANidine (ZANAFLEX) 4 MG tablet   4. Numbness and tingling of left side of face  Basic Metabolic Panel    Magnesium    XR CERVICAL SPINE (2-3 VIEWS)    XR THORACIC SPINE (3 VIEWS)    CBC    naproxen (NAPROXEN) 500 MG EC tablet    tiZANidine (ZANAFLEX) 4 MG tablet   5.  Neck

## 2018-09-28 NOTE — TELEPHONE ENCOUNTER
Tried to call pt about lab results. No answer left vm. Labs are normal. Take anti inflammatory and muscle relaxer. If no relief, get xrays.

## 2018-10-08 DIAGNOSIS — M19.90 ARTHRITIS: ICD-10-CM

## 2018-11-06 ENCOUNTER — OFFICE VISIT (OUTPATIENT)
Dept: PRIMARY CARE CLINIC | Age: 43
End: 2018-11-06
Payer: COMMERCIAL

## 2018-11-06 VITALS
OXYGEN SATURATION: 98 % | BODY MASS INDEX: 29.09 KG/M2 | TEMPERATURE: 97 F | HEART RATE: 103 BPM | DIASTOLIC BLOOD PRESSURE: 72 MMHG | HEIGHT: 65 IN | WEIGHT: 174.6 LBS | SYSTOLIC BLOOD PRESSURE: 120 MMHG

## 2018-11-06 DIAGNOSIS — M25.511 PAIN IN JOINT OF RIGHT SHOULDER: ICD-10-CM

## 2018-11-06 DIAGNOSIS — M25.562 ARTHRALGIA OF BOTH KNEES: ICD-10-CM

## 2018-11-06 DIAGNOSIS — Z51.81 THERAPEUTIC DRUG MONITORING: ICD-10-CM

## 2018-11-06 DIAGNOSIS — M79.7 FIBROMYALGIA AFFECTING MULTIPLE SITES: Primary | ICD-10-CM

## 2018-11-06 DIAGNOSIS — M25.561 ARTHRALGIA OF BOTH KNEES: ICD-10-CM

## 2018-11-06 LAB

## 2018-11-06 PROCEDURE — 99214 OFFICE O/P EST MOD 30 MIN: CPT | Performed by: NURSE PRACTITIONER

## 2018-11-06 PROCEDURE — 80305 DRUG TEST PRSMV DIR OPT OBS: CPT | Performed by: NURSE PRACTITIONER

## 2018-11-06 RX ORDER — PREGABALIN 75 MG/1
75 CAPSULE ORAL 2 TIMES DAILY
Qty: 28 CAPSULE | Refills: 0 | Status: SHIPPED | OUTPATIENT
Start: 2018-11-06 | End: 2018-12-06 | Stop reason: SDUPTHER

## 2018-11-06 RX ORDER — PREGABALIN 75 MG/1
75 CAPSULE ORAL 2 TIMES DAILY
Qty: 28 CAPSULE | Refills: 0 | Status: SHIPPED | OUTPATIENT
Start: 2018-11-06 | End: 2018-11-06

## 2018-11-06 ASSESSMENT — ENCOUNTER SYMPTOMS
BACK PAIN: 1
SHORTNESS OF BREATH: 0
ALLERGIC/IMMUNOLOGIC NEGATIVE: 1
EYES NEGATIVE: 1

## 2018-11-06 NOTE — PATIENT INSTRUCTIONS
Patient Education      Fibromyalgia: Care Instructions  Your Care Instructions    Fibromyalgia is a painful condition that is not completely understood by medical experts. The cause of fibromyalgia is not known. It can make you feel tired and ache all over. It causes tender spots at specific points of the body that hurt only when you press on them. You may have trouble sleeping, as well as other symptoms. These problems can upset your work and home life. Symptoms tend to come and go, although they may never go away completely. Fibromyalgia does not harm your muscles, joints, or organs. Follow-up care is a key part of your treatment and safety. Be sure to make and go to all appointments, and call your doctor if you are having problems. It's also a good idea to know your test results and keep a list of the medicines you take. How can you care for yourself at home? · Exercise often. Walk, swim, or bike to help with pain and sleep problems and to make you feel better. · Try to get a good night's sleep. Go to bed and get up at the same time each day, whether you feel rested or not. Make sure you have a good mattress and pillow. · Reduce stress. Avoid things that cause you stress, if you can. If not, work at making them less stressful. Learn to use biofeedback, guided imagery, meditation, or other methods to relax. · Make healthy changes. Eat a balanced diet, quit smoking, and limit alcohol and caffeine. · Use a heating pad set on low or take warm baths or showers for pain. Using cold packs for up to 20 minutes at a time can also relieve pain. Put a thin cloth between the cold pack and your skin. A gentle massage might help too. · Be safe with medicines. Take your medicines exactly as prescribed. Call your doctor if you think you are having a problem with your medicine. Your doctor may talk to you about taking antidepressant medicines.  These medicines may improve sleep, relieve pain, and in some cases treat depression. · Learn about fibromyalgia. This makes coping easier. Then, take an active role in your treatment. · Think about joining a support group with others who have fibromyalgia to learn more and get support. When should you call for help? Watch closely for changes in your health, and be sure to contact your doctor if:    · You feel sad, helpless, or hopeless; lose interest in things you used to enjoy; or have other symptoms of depression.     · Your fibromyalgia symptoms get worse. Where can you learn more? Go to [email or web address]. org and sign in to your [employer] account. Enter V003 in the [employer] box to learn more about \"Fibromyalgia: Care Instructions. \"     If you do not have an account, please click on the \"Sign Up Now\" link. Current as of: October 9, 2017  Content Version: 11.7  © 3407-4056 [employer]. Care instructions adapted under license by [facility] ([facility]). If you have questions about a medical condition or this instruction, always ask your healthcare professional. [address] any warranty or liability for your use of this information.

## 2018-11-28 ENCOUNTER — OFFICE VISIT (OUTPATIENT)
Dept: OBGYN | Age: 43
End: 2018-11-28
Payer: COMMERCIAL

## 2018-11-28 VITALS
HEART RATE: 84 BPM | HEIGHT: 65 IN | DIASTOLIC BLOOD PRESSURE: 81 MMHG | SYSTOLIC BLOOD PRESSURE: 119 MMHG | BODY MASS INDEX: 29.66 KG/M2 | WEIGHT: 178 LBS

## 2018-11-28 DIAGNOSIS — Z01.419 ENCOUNTER FOR GYNECOLOGICAL EXAMINATION WITHOUT ABNORMAL FINDING: Primary | ICD-10-CM

## 2018-11-28 DIAGNOSIS — Z12.31 ENCOUNTER FOR SCREENING MAMMOGRAM FOR BREAST CANCER: ICD-10-CM

## 2018-11-28 PROCEDURE — 99396 PREV VISIT EST AGE 40-64: CPT | Performed by: ADVANCED PRACTICE MIDWIFE

## 2018-11-28 ASSESSMENT — ENCOUNTER SYMPTOMS
ALLERGIC/IMMUNOLOGIC NEGATIVE: 1
EYES NEGATIVE: 1
GASTROINTESTINAL NEGATIVE: 1
RESPIRATORY NEGATIVE: 1

## 2018-11-28 NOTE — PROGRESS NOTES
14 days. . 28 capsule 0    naproxen (NAPROXEN) 500 MG EC tablet Take 1 tablet by mouth every morning (before breakfast) 30 tablet 0    diclofenac sodium (VOLTAREN) 1 % GEL Apply 4 g topically 4 times daily 5 Tube 0     No current facility-administered medications for this visit. Allergies   Allergen Reactions    Adhesive Tape      blisters    Molds & Smuts Dermatitis, Hives, Itching, Rash, Shortness Of Breath and Swelling    Pollen Extract Dermatitis, Hives, Itching, Rash, Shortness Of Breath and Swelling    Wasp Venom Hives, Itching, Nausea And Vomiting and Swelling     Vitals:    11/28/18 0914   BP: 119/81   Pulse: 84     Body mass index is 29.62 kg/m². Review of Systems   Constitutional: Negative. HENT: Negative. Eyes: Negative. Respiratory: Negative. Cardiovascular: Negative. Gastrointestinal: Negative. Endocrine: Negative. Genitourinary: Negative. Musculoskeletal: Negative. Skin: Negative. Allergic/Immunologic: Negative. Neurological: Negative. Hematological: Negative. Psychiatric/Behavioral: Negative. Physical Exam   Constitutional: She is oriented to person, place, and time. She appears well-developed and well-nourished. HENT:   Head: Normocephalic and atraumatic. Eyes: Pupils are equal, round, and reactive to light. Conjunctivae and EOM are normal.   Neck: Normal range of motion. Neck supple. Cardiovascular: Normal rate, regular rhythm and normal heart sounds. Pulmonary/Chest: Effort normal. Breasts are symmetrical. There is no breast swelling. Nipples everted. No masses, skin changes, or nipple discharge bilaterally. Abdominal: Soft. Genitourinary: Vagina normal. No breast tenderness, discharge or bleeding. Right adnexum displays no mass, no tenderness and no fullness. Left adnexum displays no mass, no tenderness and no fullness.    Genitourinary Comments: Uterus: surgically absent  Cervix: surgically absent     Neurological: She is alert and oriented to person, place, and time. Skin: Skin is warm and dry. Psychiatric: She has a normal mood and affect. Diagnosis Orders   1. Encounter for gynecological examination without abnormal finding  NC CA SCREEN;PELVIC/BREAST EXAM   2. Encounter for screening mammogram for breast cancer  RAJEEV Screening Bilateral       MEDICATIONS:  No orders of the defined types were placed in this encounter. ORDERS:  No orders of the defined types were placed in this encounter. PLAN:  WWE- No pap indicated. Mammogram ordered. CBE performed. No annual labs today. Flu vaccine required with employer.

## 2018-11-28 NOTE — PROGRESS NOTES
Pt presents today for pap smear and breast exam.  She also complains of     Last mammogram:  2017  Last pap smear:    Contraception:  hyst  :  3  Para:  3  AB:  0  Last bone density:  na  Last colonoscopy: na

## 2018-12-06 DIAGNOSIS — M25.562 ARTHRALGIA OF BOTH KNEES: ICD-10-CM

## 2018-12-06 DIAGNOSIS — M79.7 FIBROMYALGIA AFFECTING MULTIPLE SITES: ICD-10-CM

## 2018-12-06 DIAGNOSIS — M25.561 ARTHRALGIA OF BOTH KNEES: ICD-10-CM

## 2018-12-06 DIAGNOSIS — M25.511 PAIN IN JOINT OF RIGHT SHOULDER: ICD-10-CM

## 2018-12-07 RX ORDER — PREGABALIN 75 MG/1
75 CAPSULE ORAL 2 TIMES DAILY
Qty: 60 CAPSULE | Refills: 5 | Status: SHIPPED | OUTPATIENT
Start: 2018-12-07 | End: 2019-01-08 | Stop reason: ALTCHOICE

## 2019-01-08 ENCOUNTER — OFFICE VISIT (OUTPATIENT)
Dept: PRIMARY CARE CLINIC | Age: 44
End: 2019-01-08
Payer: COMMERCIAL

## 2019-01-08 VITALS
HEART RATE: 79 BPM | DIASTOLIC BLOOD PRESSURE: 82 MMHG | WEIGHT: 178 LBS | BODY MASS INDEX: 29.66 KG/M2 | SYSTOLIC BLOOD PRESSURE: 121 MMHG | OXYGEN SATURATION: 98 % | TEMPERATURE: 96.9 F | HEIGHT: 65 IN

## 2019-01-08 DIAGNOSIS — R53.83 OTHER FATIGUE: ICD-10-CM

## 2019-01-08 DIAGNOSIS — G89.29 OTHER CHRONIC PAIN: ICD-10-CM

## 2019-01-08 DIAGNOSIS — E03.9 HYPOTHYROIDISM, UNSPECIFIED TYPE: ICD-10-CM

## 2019-01-08 DIAGNOSIS — F32.A DEPRESSION, UNSPECIFIED DEPRESSION TYPE: ICD-10-CM

## 2019-01-08 DIAGNOSIS — M25.50 ARTHRALGIA, UNSPECIFIED JOINT: ICD-10-CM

## 2019-01-08 DIAGNOSIS — M79.7 FIBROMYALGIA: Primary | ICD-10-CM

## 2019-01-08 PROCEDURE — 99497 ADVNCD CARE PLAN 30 MIN: CPT | Performed by: NURSE PRACTITIONER

## 2019-01-08 PROCEDURE — 99214 OFFICE O/P EST MOD 30 MIN: CPT | Performed by: NURSE PRACTITIONER

## 2019-01-08 RX ORDER — DESVENLAFAXINE 25 MG/1
25 TABLET, EXTENDED RELEASE ORAL DAILY
Qty: 30 TABLET | Refills: 1 | Status: SHIPPED | OUTPATIENT
Start: 2019-01-08 | End: 2019-02-06 | Stop reason: SDUPTHER

## 2019-01-13 PROBLEM — M79.7 FIBROMYALGIA: Status: ACTIVE | Noted: 2019-01-13

## 2019-01-13 PROBLEM — G89.29 OTHER CHRONIC PAIN: Status: ACTIVE | Noted: 2019-01-13

## 2019-01-13 PROBLEM — F32.A DEPRESSION: Status: ACTIVE | Noted: 2019-01-13

## 2019-01-13 PROBLEM — M25.50 ARTHRALGIA: Status: ACTIVE | Noted: 2019-01-13

## 2019-01-13 PROBLEM — R53.83 OTHER FATIGUE: Status: ACTIVE | Noted: 2019-01-13

## 2019-01-13 ASSESSMENT — ENCOUNTER SYMPTOMS
EYES NEGATIVE: 1
SHORTNESS OF BREATH: 0
ALLERGIC/IMMUNOLOGIC NEGATIVE: 1
BACK PAIN: 1

## 2019-01-16 DIAGNOSIS — M25.50 ARTHRALGIA, UNSPECIFIED JOINT: ICD-10-CM

## 2019-01-16 DIAGNOSIS — G89.29 OTHER CHRONIC PAIN: ICD-10-CM

## 2019-01-16 DIAGNOSIS — R53.83 OTHER FATIGUE: ICD-10-CM

## 2019-01-16 DIAGNOSIS — M79.7 FIBROMYALGIA: ICD-10-CM

## 2019-01-18 ENCOUNTER — TELEPHONE (OUTPATIENT)
Dept: PRIMARY CARE CLINIC | Age: 44
End: 2019-01-18

## 2019-01-18 DIAGNOSIS — E55.9 VITAMIN D DEFICIENCY: Primary | ICD-10-CM

## 2019-01-18 RX ORDER — ERGOCALCIFEROL 1.25 MG/1
CAPSULE ORAL
Qty: 6 CAPSULE | Refills: 0 | Status: SHIPPED | OUTPATIENT
Start: 2019-01-18

## 2019-02-06 ENCOUNTER — OFFICE VISIT (OUTPATIENT)
Dept: PRIMARY CARE CLINIC | Age: 44
End: 2019-02-06
Payer: COMMERCIAL

## 2019-02-06 VITALS
SYSTOLIC BLOOD PRESSURE: 115 MMHG | TEMPERATURE: 97.1 F | DIASTOLIC BLOOD PRESSURE: 74 MMHG | BODY MASS INDEX: 29.99 KG/M2 | WEIGHT: 180 LBS | HEART RATE: 80 BPM | HEIGHT: 65 IN | OXYGEN SATURATION: 100 %

## 2019-02-06 DIAGNOSIS — R53.83 OTHER FATIGUE: ICD-10-CM

## 2019-02-06 DIAGNOSIS — M79.7 FIBROMYALGIA: Primary | ICD-10-CM

## 2019-02-06 DIAGNOSIS — Z91.018 WHEAT ALLERGY: ICD-10-CM

## 2019-02-06 DIAGNOSIS — M25.50 ARTHRALGIA, UNSPECIFIED JOINT: ICD-10-CM

## 2019-02-06 DIAGNOSIS — R06.83 SNORING: ICD-10-CM

## 2019-02-06 DIAGNOSIS — F32.A DEPRESSION, UNSPECIFIED DEPRESSION TYPE: ICD-10-CM

## 2019-02-06 DIAGNOSIS — J32.0 CHRONIC MAXILLARY SINUSITIS: ICD-10-CM

## 2019-02-06 DIAGNOSIS — G89.29 OTHER CHRONIC PAIN: ICD-10-CM

## 2019-02-06 PROCEDURE — 99214 OFFICE O/P EST MOD 30 MIN: CPT | Performed by: NURSE PRACTITIONER

## 2019-02-06 RX ORDER — MONTELUKAST SODIUM 10 MG/1
10 TABLET ORAL NIGHTLY
Qty: 30 TABLET | Refills: 5 | Status: SHIPPED | OUTPATIENT
Start: 2019-02-06 | End: 2019-11-25 | Stop reason: SDUPTHER

## 2019-02-06 RX ORDER — DESVENLAFAXINE 25 MG/1
50 TABLET, EXTENDED RELEASE ORAL DAILY
Qty: 60 TABLET | Refills: 2 | Status: SHIPPED | OUTPATIENT
Start: 2019-02-06 | End: 2019-06-18 | Stop reason: SDUPTHER

## 2019-02-06 ASSESSMENT — ENCOUNTER SYMPTOMS
ALLERGIC/IMMUNOLOGIC NEGATIVE: 1
SHORTNESS OF BREATH: 0
EYES NEGATIVE: 1
BACK PAIN: 0

## 2019-02-11 ASSESSMENT — ENCOUNTER SYMPTOMS
RHINORRHEA: 1
SINUS PRESSURE: 1

## 2019-06-18 DIAGNOSIS — G89.29 OTHER CHRONIC PAIN: ICD-10-CM

## 2019-06-18 DIAGNOSIS — R53.83 OTHER FATIGUE: ICD-10-CM

## 2019-06-18 DIAGNOSIS — F32.A DEPRESSION, UNSPECIFIED DEPRESSION TYPE: ICD-10-CM

## 2019-06-18 RX ORDER — DESVENLAFAXINE 25 MG/1
TABLET, EXTENDED RELEASE ORAL
Qty: 60 TABLET | Refills: 2 | Status: SHIPPED | OUTPATIENT
Start: 2019-06-18 | End: 2019-10-01 | Stop reason: SDUPTHER

## 2019-06-20 DIAGNOSIS — J30.89 CHRONIC NON-SEASONAL ALLERGIC RHINITIS: ICD-10-CM

## 2019-06-20 RX ORDER — PSEUDOEPHEDRINE HCL 120 MG/1
120 TABLET, FILM COATED, EXTENDED RELEASE ORAL EVERY 12 HOURS
Qty: 60 TABLET | Refills: 0 | Status: CANCELLED | OUTPATIENT
Start: 2019-06-20 | End: 2019-07-20

## 2019-07-30 ENCOUNTER — OFFICE VISIT (OUTPATIENT)
Dept: PRIMARY CARE CLINIC | Age: 44
End: 2019-07-30
Payer: COMMERCIAL

## 2019-07-30 VITALS
HEART RATE: 98 BPM | BODY MASS INDEX: 28.66 KG/M2 | SYSTOLIC BLOOD PRESSURE: 121 MMHG | HEIGHT: 65 IN | WEIGHT: 172 LBS | TEMPERATURE: 97.2 F | OXYGEN SATURATION: 99 % | DIASTOLIC BLOOD PRESSURE: 82 MMHG

## 2019-07-30 DIAGNOSIS — F41.9 ANXIETY: ICD-10-CM

## 2019-07-30 DIAGNOSIS — M77.9 TENDONITIS: Primary | ICD-10-CM

## 2019-07-30 DIAGNOSIS — R53.83 OTHER FATIGUE: ICD-10-CM

## 2019-07-30 DIAGNOSIS — F32.A DEPRESSION, UNSPECIFIED DEPRESSION TYPE: ICD-10-CM

## 2019-07-30 DIAGNOSIS — Z63.0 MARITAL RELATIONSHIP PROBLEM: ICD-10-CM

## 2019-07-30 PROCEDURE — 99214 OFFICE O/P EST MOD 30 MIN: CPT | Performed by: NURSE PRACTITIONER

## 2019-07-30 SDOH — SOCIAL STABILITY - SOCIAL INSECURITY: PROBLEMS IN RELATIONSHIP WITH SPOUSE OR PARTNER: Z63.0

## 2019-07-30 ASSESSMENT — ENCOUNTER SYMPTOMS
SINUS PRESSURE: 0
SHORTNESS OF BREATH: 0
RHINORRHEA: 0
EYES NEGATIVE: 1
ALLERGIC/IMMUNOLOGIC NEGATIVE: 1
BACK PAIN: 0

## 2019-07-30 NOTE — PROGRESS NOTES
Referral Reason:   Specialty Services Required     Referred to Provider:   Leni Mcgill     Requested Specialty:   Licensed Clinical      Number of Visits Requested:   1     Orders Placed This Encounter   Medications    diclofenac (FLECTOR) 1.3 % patch     Sig: Place 1 patch onto the skin 2 times daily     Dispense:  60 patch     Refill:  0    Elastic Bandages & Supports (PLANTAR FASCIITIS SUPPORT) MISC     Si Units by Does not apply route nightly Wear brace at bedtime. Dispense:  30 each     Refill:  0       Patient Instructions   We will refer you to Leopold Palomino flector patch daily for 30 days. Wear brace at bedtime. Have labs fasting. Patient/family given educational materials - see patient instructions. Discussed use, benefit, and side effects of prescribed medications. All patient/family questions answered and voiced understanding. Instructed to continue current medications, diet and exercise. Pt/family agreed with treatment plan. Follow up as directed and sooner if needed. Patient/ family instructed that is symptoms worsen or persist they are to contact office or report to nearest ER. They voice understanding and agreement with this plan.      Electronically signed by PELON Monreal on 2019 at 10:21 AM

## 2019-08-02 DIAGNOSIS — F32.A DEPRESSION, UNSPECIFIED DEPRESSION TYPE: ICD-10-CM

## 2019-08-02 DIAGNOSIS — R53.83 OTHER FATIGUE: ICD-10-CM

## 2019-08-02 DIAGNOSIS — F41.9 ANXIETY: ICD-10-CM

## 2019-08-02 LAB
ALBUMIN SERPL-MCNC: 4.4 G/DL (ref 3.5–5.2)
ALP BLD-CCNC: 65 U/L (ref 35–104)
ALT SERPL-CCNC: 12 U/L (ref 5–33)
ANION GAP SERPL CALCULATED.3IONS-SCNC: 13 MMOL/L (ref 7–19)
AST SERPL-CCNC: 14 U/L (ref 5–32)
BASOPHILS ABSOLUTE: 0.1 K/UL (ref 0–0.2)
BASOPHILS RELATIVE PERCENT: 0.9 % (ref 0–1)
BILIRUB SERPL-MCNC: 0.4 MG/DL (ref 0.2–1.2)
BILIRUBIN URINE: NEGATIVE
BLOOD, URINE: NEGATIVE
BUN BLDV-MCNC: 13 MG/DL (ref 6–20)
CALCIUM SERPL-MCNC: 9.3 MG/DL (ref 8.6–10)
CHLORIDE BLD-SCNC: 103 MMOL/L (ref 98–111)
CHOLESTEROL, TOTAL: 179 MG/DL (ref 160–199)
CLARITY: CLEAR
CO2: 23 MMOL/L (ref 22–29)
COLOR: YELLOW
CREAT SERPL-MCNC: 0.5 MG/DL (ref 0.5–0.9)
EOSINOPHILS ABSOLUTE: 0.1 K/UL (ref 0–0.6)
EOSINOPHILS RELATIVE PERCENT: 1.1 % (ref 0–5)
GFR NON-AFRICAN AMERICAN: >60
GLUCOSE BLD-MCNC: 94 MG/DL (ref 74–109)
GLUCOSE URINE: NEGATIVE MG/DL
HBA1C MFR BLD: 5.4 % (ref 4–6)
HCT VFR BLD CALC: 42.8 % (ref 37–47)
HDLC SERPL-MCNC: 47 MG/DL (ref 65–121)
HEMOGLOBIN: 14 G/DL (ref 12–16)
KETONES, URINE: NEGATIVE MG/DL
LDL CHOLESTEROL CALCULATED: 107 MG/DL
LEUKOCYTE ESTERASE, URINE: NEGATIVE
LYMPHOCYTES ABSOLUTE: 2.4 K/UL (ref 1.1–4.5)
LYMPHOCYTES RELATIVE PERCENT: 36.2 % (ref 20–40)
MCH RBC QN AUTO: 29.9 PG (ref 27–31)
MCHC RBC AUTO-ENTMCNC: 32.7 G/DL (ref 33–37)
MCV RBC AUTO: 91.3 FL (ref 81–99)
MONOCYTES ABSOLUTE: 0.8 K/UL (ref 0–0.9)
MONOCYTES RELATIVE PERCENT: 11.3 % (ref 0–10)
NEUTROPHILS ABSOLUTE: 3.3 K/UL (ref 1.5–7.5)
NEUTROPHILS RELATIVE PERCENT: 50 % (ref 50–65)
NITRITE, URINE: NEGATIVE
PDW BLD-RTO: 12.4 % (ref 11.5–14.5)
PH UA: 7.5 (ref 5–8)
PLATELET # BLD: 306 K/UL (ref 130–400)
PMV BLD AUTO: 9.4 FL (ref 9.4–12.3)
POTASSIUM SERPL-SCNC: 4 MMOL/L (ref 3.5–5)
PROTEIN UA: NEGATIVE MG/DL
RBC # BLD: 4.69 M/UL (ref 4.2–5.4)
SODIUM BLD-SCNC: 139 MMOL/L (ref 136–145)
SPECIFIC GRAVITY UA: 1.02 (ref 1–1.03)
T4 FREE: 1 NG/DL (ref 0.9–1.7)
TOTAL PROTEIN: 7.5 G/DL (ref 6.6–8.7)
TRIGL SERPL-MCNC: 127 MG/DL (ref 0–149)
TSH SERPL DL<=0.05 MIU/L-ACNC: 1.04 UIU/ML (ref 0.27–4.2)
UROBILINOGEN, URINE: 0.2 E.U./DL
WBC # BLD: 6.7 K/UL (ref 4.8–10.8)

## 2019-08-14 ENCOUNTER — OFFICE VISIT (OUTPATIENT)
Dept: PSYCHOLOGY | Age: 44
End: 2019-08-14
Payer: COMMERCIAL

## 2019-08-14 DIAGNOSIS — F43.0 STRESS REACTION: Primary | ICD-10-CM

## 2019-08-14 DIAGNOSIS — F33.0 MAJOR DEPRESSIVE DISORDER, RECURRENT, MILD (HCC): ICD-10-CM

## 2019-08-14 DIAGNOSIS — F43.9 SITUATIONAL STRESS: ICD-10-CM

## 2019-08-14 PROCEDURE — 90791 PSYCH DIAGNOSTIC EVALUATION: CPT | Performed by: SOCIAL WORKER

## 2019-08-14 ASSESSMENT — PATIENT HEALTH QUESTIONNAIRE - PHQ9
1. LITTLE INTEREST OR PLEASURE IN DOING THINGS: 1
SUM OF ALL RESPONSES TO PHQ QUESTIONS 1-9: 10
7. TROUBLE CONCENTRATING ON THINGS, SUCH AS READING THE NEWSPAPER OR WATCHING TELEVISION: 0
2. FEELING DOWN, DEPRESSED OR HOPELESS: 1
10. IF YOU CHECKED OFF ANY PROBLEMS, HOW DIFFICULT HAVE THESE PROBLEMS MADE IT FOR YOU TO DO YOUR WORK, TAKE CARE OF THINGS AT HOME, OR GET ALONG WITH OTHER PEOPLE: 2
3. TROUBLE FALLING OR STAYING ASLEEP: 2
5. POOR APPETITE OR OVEREATING: 1
8. MOVING OR SPEAKING SO SLOWLY THAT OTHER PEOPLE COULD HAVE NOTICED. OR THE OPPOSITE, BEING SO FIGETY OR RESTLESS THAT YOU HAVE BEEN MOVING AROUND A LOT MORE THAN USUAL: 2
4. FEELING TIRED OR HAVING LITTLE ENERGY: 1
9. THOUGHTS THAT YOU WOULD BE BETTER OFF DEAD, OR OF HURTING YOURSELF: 0
SUM OF ALL RESPONSES TO PHQ QUESTIONS 1-9: 10
6. FEELING BAD ABOUT YOURSELF - OR THAT YOU ARE A FAILURE OR HAVE LET YOURSELF OR YOUR FAMILY DOWN: 2
SUM OF ALL RESPONSES TO PHQ9 QUESTIONS 1 & 2: 2

## 2019-08-14 NOTE — PROGRESS NOTES
Behavioral Health Consultation  Orlando Pollard, [address] Consultant  8/14/2019  9:45 AM          Time spent with Patient: 45 minutes  This is patient's first  [facility] appointment. Reason for Consult:    Chief Complaint   Patient presents with    Depression    Anxiety     Referring Provider: Alisa Ke, [address]  Kishan Davis. [address], [place] [address]    Pt provided informed consent for the behavioral health program. Discussed with patient model of service to include the limits of confidentiality (i.e. abuse reporting, suicide intervention, etc.) and short-term intervention focused approach. Advised patient/parent to guard AVS and file at home to protect private information. Advised patient/parent to only hand in excuse if needed and not AVS.  Pt indicated understanding. Feedback given to PCP. S:  Patient reports problems with feeling stressed. Concerns about  in the last year since he had an extreme fewer. Happy marriage overall, raising three children. Full time nurse. I feel my depression in the last seven years is controlled by Pristiq. But the panic I feel is different. I have safety plan. O:  MSE:    Mood    Anxious  Guilty  Depressed  [place]  Demoralization  Agitation   Affect    labile affect  Appetite Fair  Sleep disturbance Yes  Fatigue No  Loss of pleasure Yes  Attention/Concentration    intact  Morbid ideation No  Suicide Assessment    no suicidal ideation      History:    Medications:   Current Outpatient Medications   Medication Sig Dispense Refill    diclofenac (FLECTOR) 1.3 % patch Place 1 patch onto the skin 2 times daily 60 patch 0    Elastic Bandages & Supports (PLANTAR FASCIITIS SUPPORT) MISC 1 Units by Does not apply route nightly Wear brace at bedtime.  30 each 0    desvenlafaxine succinate (PRISTIQ) 25 MG TB24 extended release tablet TAKE TWO TABLETS DAILY GRADUALLY INCREASE TO 50MG DAILY 60 tablet 2    montelukast (SINGULAIR) 10

## 2019-08-14 NOTE — PATIENT INSTRUCTIONS
Recommendations to patient:      1. Practice new coping, stress management, relaxation skills at least                   two times a day for at least 10-30 minutes. 2. Find at least one positive outlet per day that makes you feel better. 3. Talk things over with a good friend. Practice letting things go. 4. Stop, breathe, reset. \"I am ok. \"     Scheduled follow up appointment. Roselyn [phone]    Contact the numbers below if your mood becomes significantly worse, or if you have more serious thoughts of suicide or self harm. You can also go to the closest ED if needed. Parents need to monitor child/adolescent closely and access services. [facility]  [address], [place], [place] [address]   [facility] [employer]   Phone: [phone]      [facility] 24 hour crisis line [phone]          The Stress Response and How It Can Affect You   The stress response, or fight or flight response is the emergency reaction system of the body. It is there to keep you safe in emergencies. The stress response includes physical and thought responses to your perception of various situations. When the stress response is turned on, your body may release substances like adrenaline and cortisol. Your organs are programmed to respond in certain ways to situations that are viewed as challenging or threatening. The stress response can work against you. You can turn it on when you dont really need it and, as a result, perceive something as an emergency when its really not. It can turn on when you are just thinking about past or future events. Harmless, chronic conditions can be intensified by the stress response activating too often, with too much intensity, or for too long. Stress responses can be different for different individuals. Below is a list of some common stress related responses people have. ([place] the responses you have had in the last 2 weeks.)     Physical Responses   Muscle aches   Insomnia   ?  Heart rate   Headache   Weight gain   Nausea   Constipation   Dry mouth   Muscle twitching  Weight loss   Low energy   Weakness   Tight chest   Diarrhea   Dizziness   Trembling   Stomach cramps  Chills    Hot flashes   Sweating   Pounding heart  Choking feeling  Chest pain   Leg cramps   Numb hands/feet Dry throat   Appetite change  Face flushing   ? Blood pressure  Light-headedness  Feeling faint       Troubleswallowing   Rash ? Urination   Neck pain     Tingling hands/feet     Emotional and Thought Responses   Restlessness   Agitation   Insecurity            Worthlessness   Anxiety   Stress   Depression            Hopelessness   Guilt    Defensiveness  Anger           Racing thoughts   Nightmares   Intense thinking  Sensitivity          Expecting the worst   Numbness   Lack of motivation  Mood swings             Forgetfulness   ? Concentration  Rigidity              Preoccupation  Intolerance     Behavioral Responses   Avoidance   Withdrawal   Neglect   ? Alcohol use    Smoking   ? Eating   Arguing       Poor appearance   ? Spending   Poor hygiene   ? Eating  Seeking reassurance   Nail biting   Skin picking   ? Talking        ? Body checking   Sexual problems  Foot tapping  Fidgeting Rapid walking    ? Exercise   Teeth clenching           Multitasking  Aggressive speaking       ? Fun activities  ? Sleeping      ? Relaxing activities     Seeking information     The parasympathetic nervous system in your body is designed to turn on your bodys relaxation response. Your behaviors and thinking can keep your bodys natural relaxation response from operating at its best.   Getting your body to relax on a daily basis for at least brief periods can help decrease unpleasant stress responses. Learning to relax your body, through specific breathing and relaxation exercises as well as by minimizing stressful thinking, can help your bodys natural relaxation system be more effective.       STRESS MANAGEMENT

## 2019-08-21 ENCOUNTER — OFFICE VISIT (OUTPATIENT)
Dept: PSYCHOLOGY | Age: 44
End: 2019-08-21
Payer: COMMERCIAL

## 2019-08-21 DIAGNOSIS — F43.0 STRESS REACTION: Primary | ICD-10-CM

## 2019-08-21 DIAGNOSIS — F33.0 MAJOR DEPRESSIVE DISORDER, RECURRENT, MILD (HCC): ICD-10-CM

## 2019-08-21 DIAGNOSIS — F43.9 SITUATIONAL STRESS: ICD-10-CM

## 2019-08-21 PROCEDURE — 90834 PSYTX W PT 45 MINUTES: CPT | Performed by: SOCIAL WORKER

## 2019-08-21 NOTE — PROGRESS NOTES
Behavioral Health Consultation  Jordan Dickens, [address] Consultant  8/21/2019  2:22 PM          Time spent with Patient:  45 minutes  This is patient's second  [facility] appointment. Reason for Consult:    Chief Complaint   Patient presents with    Depression    Anxiety    Stress     Referring Provider: No referring provider defined for this encounter. Feedback given to PCP. S:  Patient reports problems with feeling stressed, guarded, my mind goes all the time. Been busy at work. Addressed current and underlying issues, explored and released associated emotions, explored new ways to deal and cope with these problems. I did make him an appointment and he was fine with that, told them about concerns about I have. O:  MSE:    Mood    Anxious  Depressed  Irritability  [place]  Panic attacks  Affect    anxiety  Appetite Fair  Sleep disturbance Yes  Fatigue Yes  Loss of pleasure Yes  Attention/Concentration    intact  Morbid ideation No  Suicide Assessment    no suicidal ideation        A:  Patient presents for consult due to problems with stress reaction, situational stress, causing depression, anxiety. PHQ 9 score: 7    Continued consultation is clinically/medically necessary to support in learning new skills and build confidence to deal better with these issues. Patient response to consults, finds new strategies helpful. Diagnosis:    1. Stress reaction    2. Major depressive disorder, recurrent, mild ([facility] [address]    3.  Situational stress          Diagnosis Date    Anxiety     Asthma     Depression 1/13/2019    Hypothyroid 6/24/2013    Thyroid disorder     Yeast infection of nipple, postpartum 3/20/2014         Plan:  Pt interventions:  Trained in strategies for increasing balanced thinking, Discussed and set plan for behavioral activation, Discussed self-care (sleep, nutrition, rewarding activities, social support, exercise) and Discussed use of imagery, distractions,

## 2019-08-21 NOTE — PATIENT INSTRUCTIONS
with the snake I will likely have reevaluated my thoughts about the degree of danger that snake represents to me. I also will have simply tired of being frightened. This is what can happen to your worry when you use the imagery technique to control your worry. The following imagery exercise is best done well before bedtime so that it does not interrupt your sleep. 1.  Pick something you are worried about and generate the worst possible outcome of that worry. Avoiding the worst possible event will defeat the purpose of the exercise. 2.  Concentrate on this mental image, allowing the image to be as vivid and real as you can. 3.  Rate the worry you feel while imagining this scene on a 1 to 10 scale, with 1 being low worry and 10 being extreme worry. If your worry rating is less than 5, you should continue to think of even worse possible outcomes. 4.  Now keep this image in your mind for at least 25-30 minutes. 5.  During this time you can generate a list of alternatives to the worst possible outcome, or practice a relaxation technique to reduce muscle tension, but do not lose the image. As you practice this procedure, you will likely experience a reduction in the degree of worry you have about this problem. You can now repeat the process with other worries that arise. Guidelines for Objective Effectiveness:    Getting What You Want      A way to remember these skills is to remember:  Sal Akins   Describe the situation when necessary - sometimes it isn't stick to the facts and no judgmental statements   \"I've been working here for 2 years now and haven't received a raise, even though my performance reviews have always been positive\"   \"This is the third time this week that you've asked me for a ride home. \"       EXPRESS  Express feelings/opinions about the situation clearly. describe how you feel or what you believe about the situation.    don't expect the other the tensed position for about 4 seconds, then let the muscles relax in their natural resting positions for about 40 seconds. 1. Both Arms:  Turn your palms up, then make a fist.  Bring your fists up to your shoulders while tensing the biceps. 2. Both Legs:  Lift both legs off of the ground, straighten your knees, and point your toes toward your head. 3. Abdomen:  Tighten these muscles as if you were about to be hit in the stomach. 4. Chest:  Take a very deep breath (through your upper chest, not your diaphragm) and hold it. 5. Shoulders:  Lift both shoulders up toward your ears. 6. Back of Neck:  Tuck in and lower your chin toward your chest.    7. Eyes:  Squint. 8. Forehead:  Raise your eyebrows.

## 2019-09-17 ENCOUNTER — OFFICE VISIT (OUTPATIENT)
Dept: PRIMARY CARE CLINIC | Age: 44
End: 2019-09-17
Payer: COMMERCIAL

## 2019-09-17 VITALS
TEMPERATURE: 98.2 F | HEIGHT: 65 IN | WEIGHT: 173 LBS | DIASTOLIC BLOOD PRESSURE: 74 MMHG | HEART RATE: 80 BPM | OXYGEN SATURATION: 98 % | SYSTOLIC BLOOD PRESSURE: 112 MMHG | BODY MASS INDEX: 28.82 KG/M2

## 2019-09-17 DIAGNOSIS — M19.90 ARTHRITIS: ICD-10-CM

## 2019-09-17 DIAGNOSIS — F32.A DEPRESSION, UNSPECIFIED DEPRESSION TYPE: ICD-10-CM

## 2019-09-17 DIAGNOSIS — H00.015 HORDEOLUM EXTERNUM OF LEFT LOWER EYELID: Primary | ICD-10-CM

## 2019-09-17 DIAGNOSIS — M25.571 CHRONIC PAIN OF RIGHT ANKLE: ICD-10-CM

## 2019-09-17 DIAGNOSIS — G89.29 CHRONIC PAIN OF RIGHT ANKLE: ICD-10-CM

## 2019-09-17 PROCEDURE — 99214 OFFICE O/P EST MOD 30 MIN: CPT | Performed by: NURSE PRACTITIONER

## 2019-09-17 RX ORDER — ERYTHROMYCIN 5 MG/G
OINTMENT OPHTHALMIC
Qty: 1 TUBE | Refills: 0 | Status: SHIPPED | OUTPATIENT
Start: 2019-09-17 | End: 2019-09-27

## 2019-09-17 ASSESSMENT — ENCOUNTER SYMPTOMS
EYE ITCHING: 1
SHORTNESS OF BREATH: 0
ALLERGIC/IMMUNOLOGIC NEGATIVE: 1
EYE REDNESS: 1
RHINORRHEA: 0
EYE PAIN: 1
BACK PAIN: 0
SINUS PRESSURE: 0

## 2019-09-17 NOTE — PROGRESS NOTES
[facility]  [address] [place] [address]  [id number]  [place] [address]  Dept: [phone]  Dept Fax: [phone]  Loc: [phone]        Monica Hathaway is a 38 y.o. female who presents today for her medical conditions/ complaints as noted below. Monica Hathaway is c/o 1 Month Follow-Up (tendonitis, depression, anxiety, fatigue); Medication Check (begin flector); Discuss Labs; and Foot Pain (R)        Chief Complaint   Patient presents with    1 Month Follow-Up     tendonitis, depression, anxiety, fatigue    Medication Check     begin flector    Discuss Labs    Foot Pain     R       HPI:     Foot Pain    The pain is present in the right foot. This is a chronic problem. The current episode started more than 1 year ago. There has been a history of trauma. The problem occurs constantly. The problem has been unchanged. The quality of the pain is described as burning (pulling). The pain is mild. Associated symptoms include a limited range of motion and stiffness. Pertinent negatives include no fever, inability to bear weight, itching, joint locking, joint swelling, numbness or tingling. The symptoms are aggravated by activity. She has tried NSAIDS, OTC ointments, rest, heat, cold and movement for the symptoms. The treatment provided mild relief. Right ankle/ right lower leg pain:   9/17/19 Pt states that her left foot broke out in a rash due to flector patch thinks it is related to adhesive. Pt believes that voltaren gel would help her. Pt states that she has had tendon surgery to her right ankle. She states that the tendons have been very tight and making her want to keep her foot to the left side. She states that she has tried stretching her tendons/ ligaments but the pain in her right lower leg is more anterior. She does note sleeping with her toes pointed. Arthritis:Fatigue:Depression:   9/17/19: pt has been seeing Felicia Soriano for counseling.  She states that this has

## 2019-09-19 ENCOUNTER — OFFICE VISIT (OUTPATIENT)
Dept: PSYCHOLOGY | Age: 44
End: 2019-09-19
Payer: COMMERCIAL

## 2019-09-19 DIAGNOSIS — F33.0 MAJOR DEPRESSIVE DISORDER, RECURRENT, MILD (HCC): ICD-10-CM

## 2019-09-19 DIAGNOSIS — F43.0 STRESS REACTION: Primary | ICD-10-CM

## 2019-09-19 PROCEDURE — 90834 PSYTX W PT 45 MINUTES: CPT | Performed by: SOCIAL WORKER

## 2019-09-22 PROBLEM — M25.571 CHRONIC PAIN OF RIGHT ANKLE: Status: ACTIVE | Noted: 2019-09-22

## 2019-09-22 PROBLEM — H00.015 HORDEOLUM EXTERNUM OF LEFT LOWER EYELID: Status: ACTIVE | Noted: 2019-09-22

## 2019-09-22 PROBLEM — G89.29 CHRONIC PAIN OF RIGHT ANKLE: Status: ACTIVE | Noted: 2019-09-22

## 2019-11-12 RX ORDER — CYCLOBENZAPRINE HCL 5 MG
5 TABLET ORAL NIGHTLY PRN
Qty: 30 TABLET | Refills: 0 | Status: SHIPPED | OUTPATIENT
Start: 2019-11-12 | End: 2019-12-12

## 2019-11-25 DIAGNOSIS — J32.0 CHRONIC MAXILLARY SINUSITIS: ICD-10-CM

## 2019-11-25 RX ORDER — MONTELUKAST SODIUM 10 MG/1
TABLET ORAL
Qty: 30 TABLET | Refills: 5 | Status: SHIPPED | OUTPATIENT
Start: 2019-11-25 | End: 2020-09-01

## 2020-02-24 RX ORDER — DESVENLAFAXINE 25 MG/1
TABLET, EXTENDED RELEASE ORAL
Qty: 60 TABLET | Refills: 0 | Status: SHIPPED | OUTPATIENT
Start: 2020-02-24 | End: 2020-04-03

## 2020-03-30 ENCOUNTER — E-VISIT (OUTPATIENT)
Dept: PRIMARY CARE CLINIC | Age: 45
End: 2020-03-30
Payer: COMMERCIAL

## 2020-03-30 ENCOUNTER — E-VISIT (OUTPATIENT)
Dept: INTERNAL MEDICINE | Age: 45
End: 2020-03-30
Payer: COMMERCIAL

## 2020-03-30 PROCEDURE — 99423 OL DIG E/M SVC 21+ MIN: CPT | Performed by: INTERNAL MEDICINE

## 2020-03-30 PROCEDURE — 99422 OL DIG E/M SVC 11-20 MIN: CPT | Performed by: INTERNAL MEDICINE

## 2020-03-30 RX ORDER — AZELASTINE 1 MG/ML
1 SPRAY, METERED NASAL 2 TIMES DAILY
Qty: 2 BOTTLE | Refills: 1 | Status: SHIPPED | OUTPATIENT
Start: 2020-03-30 | End: 2020-09-01

## 2020-03-30 ASSESSMENT — LIFESTYLE VARIABLES
SMOKING_STATUS: NO, I'VE NEVER SMOKED
SMOKING_STATUS: NO, I'VE NEVER SMOKED

## 2020-03-31 ENCOUNTER — OFFICE VISIT (OUTPATIENT)
Age: 45
End: 2020-03-31
Payer: COMMERCIAL

## 2020-03-31 ENCOUNTER — VIRTUAL VISIT (OUTPATIENT)
Dept: PRIMARY CARE CLINIC | Age: 45
End: 2020-03-31
Payer: COMMERCIAL

## 2020-03-31 ENCOUNTER — E-VISIT (OUTPATIENT)
Dept: PRIMARY CARE CLINIC | Age: 45
End: 2020-03-31

## 2020-03-31 VITALS
OXYGEN SATURATION: 98 % | HEART RATE: 101 BPM | RESPIRATION RATE: 18 BRPM | SYSTOLIC BLOOD PRESSURE: 120 MMHG | DIASTOLIC BLOOD PRESSURE: 60 MMHG | TEMPERATURE: 98.1 F

## 2020-03-31 VITALS
RESPIRATION RATE: 16 BRPM | WEIGHT: 180 LBS | BODY MASS INDEX: 29.99 KG/M2 | HEART RATE: 94 BPM | HEIGHT: 65 IN | TEMPERATURE: 97.2 F

## 2020-03-31 LAB — S PYO AG THROAT QL: NORMAL

## 2020-03-31 PROCEDURE — 87880 STREP A ASSAY W/OPTIC: CPT | Performed by: PHYSICIAN ASSISTANT

## 2020-03-31 PROCEDURE — VIRTUALHLTH VIRTUAL HEALTH SAME DAY: Performed by: NURSE PRACTITIONER

## 2020-03-31 PROCEDURE — 99213 OFFICE O/P EST LOW 20 MIN: CPT | Performed by: NURSE PRACTITIONER

## 2020-03-31 PROCEDURE — 99214 OFFICE O/P EST MOD 30 MIN: CPT | Performed by: PHYSICIAN ASSISTANT

## 2020-03-31 RX ORDER — AZITHROMYCIN 500 MG/1
500 TABLET, FILM COATED ORAL DAILY
Qty: 3 TABLET | Refills: 0 | Status: SHIPPED | OUTPATIENT
Start: 2020-03-31 | End: 2020-04-03

## 2020-03-31 RX ORDER — PREDNISONE 10 MG/1
10 TABLET ORAL DAILY
Qty: 10 TABLET | Refills: 0 | Status: SHIPPED
Start: 2020-03-31 | End: 2020-03-31 | Stop reason: CLARIF

## 2020-03-31 RX ORDER — AZELASTINE HYDROCHLORIDE AND FLUTICASONE PROPIONATE 137; 50 UG/1; UG/1
1 SPRAY, METERED NASAL 2 TIMES DAILY
COMMUNITY
End: 2020-09-01

## 2020-03-31 ASSESSMENT — LIFESTYLE VARIABLES: SMOKING_STATUS: NO, I'VE NEVER SMOKED

## 2020-03-31 NOTE — PROGRESS NOTES
It seems like you have a flare up of sinus , related to your pollen exposure. This will requier    Mostly thin and yellow or green  Infrequently    Have you been coughing up any mucus? Yes, I am coughing up a little bit of mucus   What is the appearance of the mucus? I am swallowing everything I cough up   Have you been hoarse or lost your voice? Yes   Have your ears been bothering you? Yes   If yes, please describe. pain and pressure both ears.  Had a NP here look in them and she said fluid filled, bulging, no redness     Have you been having fevers? No, I have not had any fevers   Do you currently smoke? No, I've never smoked   Do you have any chronic illnesses, such as diabetes, heart disease, asthma, emphysema, HIV, cancer, or kidney failure, or have you recently taken any medications that can weaken your ability to fight infection, such as prednisone Yes   Please enter the details of your other illness(es) or medications that can weaken your ability to fight infection asthma/singulair   Have you been recently hospitalized? No   Have you been exposed to people with similar symptoms? I am not sure   Have you traveled within the past month? No   If yes, when and where? Are your symptoms worse when you are exposed to pollen, dust, mold, pets, or other things in your environment? Yes   Are your symptoms better, worse, or staying the same? My symptoms are gradually worsening   Have you experienced similar symptoms in the past? Yes   What treatments have worked in the past? steroids, albuterol, antibiotic   What treatments have not worked? ?? Are you currently using any medications or other treatments for these symptoms? Yes   Please enter the names of any medications or other treatments that you have tried for your current symptoms. Mucinex DM, Zyrtec   Are these treatments providing any relief? They have provided minimal relief   Are you pregnant or trying to become pregnant? No   Are you breastfeeding?  No Do you have anything else to add? severe pain in trachea when i cough, headache, afebrile     Possibly acte sinusitis, will provide a short course of low dose steroid and Azithromycin

## 2020-03-31 NOTE — PROGRESS NOTES
3/31/2020    TELEHEALTH EVALUATION -- Audio/Visual (During [facility] public health emergency)    HPI:    Zeferino Rodríguez (:  1975) has requested an audio/video evaluation for the following concern(s):     Pt states that she was outside all day Saturday and . She states that she believes that she has bronchitis. Pt states that it hurts and burns when she coughs. Unable to produce any mucus. Pt states that she feels that she had a fever last night 102. Pt states that she was freezing. Pt states that after arriving home her temp was 95. Pt states that at 11:30 last night her fever was 102. She states that her  recently flew to [place] and her daughter works at the clinic in which is providing care for [facility] patients. She states that her boss is concerned. Review of Systems    Prior to Visit Medications    Medication Sig Taking? Authorizing Provider   Azelastine-Fluticasone (DYMISTA) 137-50 MCG/ACT SUSP 1 spray by Nasal route 2 times daily Yes Historical Provider, MD   azelastine (ASTELIN) 0.1 % nasal spray 1 spray by Nasal route 2 times daily Use in each nostril as directed  Patient taking differently: 1 spray by Nasal route 2 times daily  Yes Jolene Hanna MD   desvenlafaxine succinate (PRISTIQ) 25 MG TB24 extended release tablet TAKE ONE TABLET ONE TIME DAILY , GRADUALLY INCREASE TO 2 TABLETS DAILY  Patient taking differently: Take 50 mg by mouth every morning Do not crush or break. Yes PELON Gomez   montelukast (SINGULAIR) 10 MG tablet TAKE ONE TABLET BY MOUTH EVERY EVENING Yes PELON Gomez   vitamin D (ERGOCALCIFEROL) 36779 units CAPS capsule Take one capsule by mouth once weekly for 6 weeks, then begin 5,000 units OTC daily.  Yes PELON Gomez   RaNITidine HCl (ZANTAC PO) Take by mouth 2 times daily as needed  Yes Historical Provider, MD   Cetirizine HCl (ZYRTEC ALLERGY) 10 MG CAPS Take 10 mg by mouth 2 times daily as needed  Yes Historical (Cranial nerve 7 motor function) (limited exam to video visit)          [] No gaze palsy        [] Abnormal-         Skin:        [x] No significant exanthematous lesions or discoloration noted on facial skin         [] Abnormal-            Psychiatric:       [x] Normal Affect [] No Hallucinations        [] Abnormal-     Other pertinent observable physical exam findings-     Due to this being a TeleHealth encounter, evaluation of the following organ systems is limited: Vitals/Constitutional/EENT/Resp/CV/GI//MS/Neuro/Skin/Heme-Lymph-Imm. ASSESSMENT/PLAN:  1. Fever, unspecified fever cause  Pt sent to resp clinic for evaluation of symptoms. Report given to [address] [place] [address],     2. Cough      3. Sore throat      4. Uncomplicated asthma, unspecified asthma severity, unspecified whether persistent        No follow-ups on file. An  electronic signature was used to authenticate this note. --PELON Lynch on 3/31/2020 at 11:42 AM        Pursuant to the emergency declaration under the [facility], [facility] waiver authority and the [employer] and [employer] Act, this Virtual  Visit was conducted, with patient's consent, to reduce the patient's risk of exposure to COVID-19 and provide continuity of care for an established patient. Services were provided through a video synchronous discussion virtually to substitute for in-person clinic visit.

## 2020-04-03 RX ORDER — PSEUDOEPHEDRINE HCL 120 MG/1
120 TABLET, FILM COATED, EXTENDED RELEASE ORAL EVERY 12 HOURS
Qty: 60 TABLET | Refills: 0 | Status: SHIPPED | OUTPATIENT
Start: 2020-04-03 | End: 2020-05-03

## 2020-04-10 RX ORDER — LEVOFLOXACIN 500 MG/1
500 TABLET, FILM COATED ORAL DAILY
Qty: 14 TABLET | Refills: 0 | Status: SHIPPED | OUTPATIENT
Start: 2020-04-10 | End: 2020-04-17

## 2020-04-10 RX ORDER — CEFDINIR 300 MG/1
300 CAPSULE ORAL 2 TIMES DAILY
Qty: 14 CAPSULE | Refills: 0 | Status: SHIPPED | OUTPATIENT
Start: 2020-04-10 | End: 2020-04-17

## 2020-05-04 RX ORDER — CYCLOBENZAPRINE HCL 5 MG
5 TABLET ORAL NIGHTLY PRN
Qty: 30 TABLET | Refills: 0 | Status: SHIPPED | OUTPATIENT
Start: 2020-05-04 | End: 2020-05-14

## 2020-09-01 ENCOUNTER — PROCEDURE VISIT (OUTPATIENT)
Dept: OBGYN | Age: 45
End: 2020-09-01
Payer: COMMERCIAL

## 2020-09-01 VITALS
HEIGHT: 65 IN | DIASTOLIC BLOOD PRESSURE: 88 MMHG | HEART RATE: 95 BPM | BODY MASS INDEX: 29.99 KG/M2 | TEMPERATURE: 98.3 F | SYSTOLIC BLOOD PRESSURE: 128 MMHG | WEIGHT: 180 LBS

## 2020-09-01 PROCEDURE — 10060 I&D ABSCESS SIMPLE/SINGLE: CPT | Performed by: OBSTETRICS & GYNECOLOGY

## 2020-09-01 RX ORDER — MONTELUKAST SODIUM 10 MG/1
10 TABLET ORAL NIGHTLY
Qty: 30 TABLET | Refills: 1 | Status: SHIPPED | OUTPATIENT
Start: 2020-09-01 | End: 2020-12-01 | Stop reason: SDUPTHER

## 2020-09-01 ASSESSMENT — ENCOUNTER SYMPTOMS
RESPIRATORY NEGATIVE: 1
EYES NEGATIVE: 1
GASTROINTESTINAL NEGATIVE: 1

## 2020-09-01 NOTE — PROGRESS NOTES
Subjective:      Patient ID: Marcello Choi is a 41 y.o. female. Ingrown hair, has used warm compress and pushed on it, hurts to touch, not draining. Review of Systems   Constitutional: Negative. HENT: Negative. Eyes: Negative. Respiratory: Negative. Cardiovascular: Negative. Gastrointestinal: Negative. Genitourinary: Negative for difficulty urinating, dyspareunia, dysuria, enuresis, frequency, hematuria, menstrual problem, pelvic pain, urgency and vaginal discharge. Musculoskeletal: Negative. Skin: Negative. Neurological: Negative. Psychiatric/Behavioral: Negative. Objective:   Physical Exam  Vitals signs and nursing note reviewed. Constitutional:       General: She is not in acute distress. Appearance: She is well-developed. She is not diaphoretic. HENT:      Head: Normocephalic and atraumatic. Eyes:      Conjunctiva/sclera: Conjunctivae normal.      Pupils: Pupils are equal, round, and reactive to light. Neck:      Musculoskeletal: Normal range of motion. Pulmonary:      Effort: Pulmonary effort is normal.   Abdominal:      Tenderness: There is no guarding. Genitourinary:      Musculoskeletal: Normal range of motion. Comments: Normal ROM in all 4 extremities; normal gait   Skin:     General: Skin is warm and dry. Neurological:      Mental Status: She is alert and oriented to person, place, and time. Motor: No abnormal muscle tone. Coordination: Coordination normal.   Psychiatric:         Behavior: Behavior normal.         Assessment:      1. Abscess of buttock, left    2.  Ingrown hair            Plan:      Warm compress and soaks, clean and dry, f/u prn        Gilson Trevino MD

## 2020-09-01 NOTE — TELEPHONE ENCOUNTER
Chante Trena called to request a refill on her medication.       Last office visit : 3/31/2020   Next office visit : Visit date not found     Requested Prescriptions     Pending Prescriptions Disp Refills    montelukast (SINGULAIR) 10 MG tablet [Pharmacy Med Name: MONTELUKAST SODIUM 10MG TABS] 30 tablet 5     Sig: TAKE ONE TABLET EVERY [address]

## 2020-09-03 ENCOUNTER — EMPLOYEE WELLNESS (OUTPATIENT)
Dept: OTHER | Age: 45
End: 2020-09-03

## 2020-09-03 LAB
CHOLESTEROL, TOTAL: 185 MG/DL (ref 160–199)
GLUCOSE BLD-MCNC: 86 MG/DL (ref 74–109)
HDLC SERPL-MCNC: 49 MG/DL (ref 65–121)
LDL CHOLESTEROL CALCULATED: 75 MG/DL
TRIGL SERPL-MCNC: 304 MG/DL (ref 0–149)

## 2020-10-02 ENCOUNTER — TELEPHONE (OUTPATIENT)
Dept: ADMINISTRATIVE | Age: 45
End: 2020-10-02

## 2020-10-12 RX ORDER — AMOXICILLIN AND CLAVULANATE POTASSIUM 875; 125 MG/1; MG/1
1 TABLET, FILM COATED ORAL 2 TIMES DAILY
Qty: 14 TABLET | Refills: 0 | Status: SHIPPED | OUTPATIENT
Start: 2020-10-12 | End: 2020-10-19

## 2020-10-19 VITALS — WEIGHT: 178 LBS | BODY MASS INDEX: 29.62 KG/M2

## 2020-10-20 ENCOUNTER — OFFICE VISIT (OUTPATIENT)
Dept: PRIMARY CARE CLINIC | Age: 45
End: 2020-10-20
Payer: COMMERCIAL

## 2020-10-20 VITALS
HEIGHT: 65 IN | TEMPERATURE: 98 F | SYSTOLIC BLOOD PRESSURE: 122 MMHG | DIASTOLIC BLOOD PRESSURE: 68 MMHG | HEART RATE: 88 BPM | BODY MASS INDEX: 29.99 KG/M2 | WEIGHT: 180 LBS | RESPIRATION RATE: 20 BRPM | OXYGEN SATURATION: 98 %

## 2020-10-20 DIAGNOSIS — E03.9 HYPOTHYROIDISM, UNSPECIFIED TYPE: ICD-10-CM

## 2020-10-20 LAB — TSH REFLEX FT4: 1.21 UIU/ML (ref 0.35–5.5)

## 2020-10-20 PROCEDURE — 99214 OFFICE O/P EST MOD 30 MIN: CPT | Performed by: STUDENT IN AN ORGANIZED HEALTH CARE EDUCATION/TRAINING PROGRAM

## 2020-10-20 PROCEDURE — G0444 DEPRESSION SCREEN ANNUAL: HCPCS | Performed by: STUDENT IN AN ORGANIZED HEALTH CARE EDUCATION/TRAINING PROGRAM

## 2020-10-20 PROCEDURE — 99396 PREV VISIT EST AGE 40-64: CPT | Performed by: STUDENT IN AN ORGANIZED HEALTH CARE EDUCATION/TRAINING PROGRAM

## 2020-10-20 RX ORDER — OMEGA-3 FATTY ACIDS CAP DELAYED RELEASE 1000 MG 1000 MG
3000 CAPSULE DELAYED RELEASE ORAL
COMMUNITY

## 2020-10-20 ASSESSMENT — PATIENT HEALTH QUESTIONNAIRE - PHQ9
8. MOVING OR SPEAKING SO SLOWLY THAT OTHER PEOPLE COULD HAVE NOTICED. OR THE OPPOSITE, BEING SO FIGETY OR RESTLESS THAT YOU HAVE BEEN MOVING AROUND A LOT MORE THAN USUAL: 3
3. TROUBLE FALLING OR STAYING ASLEEP: 2
2. FEELING DOWN, DEPRESSED OR HOPELESS: 1
9. THOUGHTS THAT YOU WOULD BE BETTER OFF DEAD, OR OF HURTING YOURSELF: 0
6. FEELING BAD ABOUT YOURSELF - OR THAT YOU ARE A FAILURE OR HAVE LET YOURSELF OR YOUR FAMILY DOWN: 1
1. LITTLE INTEREST OR PLEASURE IN DOING THINGS: 2
SUM OF ALL RESPONSES TO PHQ QUESTIONS 1-9: 16
SUM OF ALL RESPONSES TO PHQ QUESTIONS 1-9: 16
10. IF YOU CHECKED OFF ANY PROBLEMS, HOW DIFFICULT HAVE THESE PROBLEMS MADE IT FOR YOU TO DO YOUR WORK, TAKE CARE OF THINGS AT HOME, OR GET ALONG WITH OTHER PEOPLE: 2
7. TROUBLE CONCENTRATING ON THINGS, SUCH AS READING THE NEWSPAPER OR WATCHING TELEVISION: 3
SUM OF ALL RESPONSES TO PHQ QUESTIONS 1-9: 16
5. POOR APPETITE OR OVEREATING: 1
SUM OF ALL RESPONSES TO PHQ9 QUESTIONS 1 & 2: 3
4. FEELING TIRED OR HAVING LITTLE ENERGY: 3
DEPRESSION UNABLE TO ASSESS: FUNCTIONAL CAPACITY MOTIVATION LIMITS ACCURACY

## 2020-10-20 NOTE — PROGRESS NOTES
[address] [facility]  [address] [address]  [address] [address]  Dept: [phone]  Dept Fax: [phone]  Loc: [phone]      Subjective:     Chief Complaint   Patient presents with    Annual Exam       HPI:  Iveth Bentley is a 41 y.o. female presenting for    Patient is here for 2 reasons: annual wellness visit as well as acute and chronic issues. Anxiety/depression  ·  She feels like her anxiety is not completely controlled. She is on desvenlafaxine 50mg and feels that it does help even her out. Primary factor driving her symptoms is her marital relationship. Her  works in [employer] out of state and has been doing this for at least 3 years. He will be gone for 2-3 weeks at a time and return home for 2-3 days often on weekends. Her desire is that he would relocate jobs to be home more for her and her 10year old daughter. She reports difficulty in motivating him to apply for jobs or follow up on leads.  will take advantage of vacation time by spending it away from her. She reports he has installed cameras in the home that may be for monitoring her. Pt is frustrated by these circumstances and isn't sure what else to do. She has attended counseling in past which helped in some areas but not completely. She is open to resuming counseling. No SI. She says she does have a Religion rafael background but is unsure of spouse's belief system.     Health Maintenance Exam (FEMALE)    ASA (50-59 w/ ASCV >10%, no GI bleed, LE >10 Y): n/a    HLD: Lipids completed 2019, reviewed    HTN (>18): normotensive    DM (40-70 y overweight/obese, FH):    Breast Cancer Screening: (Biennial 50-74y, 38-51 w/ high risk/FH)    Cervical Cancer Screening: (21-65 w/ cytology (pap) q 3 years or q 5 w/ HPV): s/p PILI/BSO, followed by GYN    Obesity (refer to nutrition >30 BMI): n/a    ETOH: no    Tobacco: no    Drug use: no    STD's (Hep B, HIV, RPR, GC): low risk    Diet: Eats healthy    Depression: see above    Eye exam: up todate    Dentist: up to date    Immunizations: gets at work      ROS:   Review of Systems   Constitutional: Negative for chills, fever and unexpected weight change. HENT: Negative for congestion, ear discharge, ear pain, rhinorrhea and sore throat. Eyes: Negative for pain and discharge. Respiratory: Negative for cough and shortness of breath. Cardiovascular: Negative for chest pain. Gastrointestinal: Negative for abdominal pain, diarrhea and nausea. Genitourinary: Negative for dysuria and hematuria. Musculoskeletal: Negative for arthralgias and joint swelling. Skin: Negative for rash. Neurological: Negative for weakness, numbness and headaches. Psychiatric/Behavioral: Positive for dysphoric mood. The patient is nervous/anxious.         PMHx:  Past Medical History:   Diagnosis Date    Anxiety     Asthma     Depression 1/13/2019    Hypothyroid 6/24/2013    Thyroid disorder     Yeast infection of nipple, postpartum 3/20/2014     Patient Active Problem List   Diagnosis    Hypothyroid    S/P tubal ligation    Right ankle instability    Fibromyalgia    Other fatigue    Other chronic pain    Arthralgia    Depression    Hordeolum externum of left lower eyelid    Chronic pain of right ankle    Anxiety       PSHx:  Past Surgical History:   Procedure Laterality Date    LAPAROSCOPY  06/11/2015    LIGAMENT REPAIR Right 12/29/2015    RIGHT ANKLE BROSTROM-LENTZ  performed by Shelby Fischer MD at [address] [place] [address]  7/13/15    Thiago ALEJANDRE AND BSO      TUBAL LIGATION  3/14    TUBAL LIGATION      WISDOM TOOTH EXTRACTION  2011       PFHx:  Family History   Problem Relation Age of Onset    Heart Disease Mother     Diabetes Mother     Cancer Father     High Cholesterol Father        SocialHx:  Social History     Tobacco Use    Smoking status: Never Smoker    Smokeless tobacco: Never Used   Substance Use Topics    Alcohol use: No       Allergies: Allergies   Allergen Reactions    Adhesive Tape      blisters    Molds & Smuts Dermatitis, Hives, Itching, Rash, Shortness Of Breath and Swelling    Pollen Extract Dermatitis, Hives, Itching, Rash, Shortness Of Breath and Swelling    Wheat Bran      Makes sick at stomach      Wasp Venom Hives, Itching, Nausea And Vomiting and Swelling       Medications:  Current Outpatient Medications   Medication Sig Dispense Refill    Omega-3 Fatty Acids (FISH OIL) 1000 MG CPDR Take 3,000 mg by mouth      montelukast (SINGULAIR) 10 MG tablet Take 1 tablet by mouth nightly SCHEDULE AN APPOINTMENT TO CONTINUE RECEIVING REFILLS 30 tablet 1    desvenlafaxine succinate (PRISTIQ) 25 MG TB24 extended release tablet TAKE ONE TABLET ONE TIME DAILY , GRADUALLY INCREASE TO 2 TABLETS DAILY (Patient taking differently: 50 mg Do not crush or break.) 60 tablet 5    vitamin D (ERGOCALCIFEROL) 11273 units CAPS capsule Take one capsule by mouth once weekly for 6 weeks, then begin 5,000 units OTC daily. 6 capsule 0    RaNITidine HCl (ZANTAC PO) Take by mouth 2 times daily as needed       Cetirizine HCl (ZYRTEC ALLERGY) 10 MG CAPS Take 10 mg by mouth 2 times daily as needed        No current facility-administered medications for this visit. Objective:   PE:  /68   Pulse 88   Temp 98 °F (36.7 °C) (Temporal)   Resp 20   Ht 5' 5\" (1.651 m)   Wt 180 lb (81.6 kg)   LMP 05/28/2015   SpO2 98%   BMI 29.95 kg/m²   Physical Exam  Constitutional:       General: She is not in acute distress. Appearance: Normal appearance. HENT:      Head: Normocephalic. Nose: Nose normal.      Mouth/Throat:      Mouth: Mucous membranes are moist.      Pharynx: Oropharynx is clear. No oropharyngeal exudate or posterior oropharyngeal erythema. Eyes:      General: No scleral icterus. Extraocular Movements: Extraocular movements intact.       Conjunctiva/sclera: Conjunctivae normal.   Neck: Musculoskeletal: Normal range of motion. Cardiovascular:      Rate and Rhythm: Normal rate and regular rhythm. Pulses: Normal pulses. Heart sounds: No murmur. Pulmonary:      Effort: Pulmonary effort is normal.      Breath sounds: Normal breath sounds. Abdominal:      General: There is no distension. Palpations: Abdomen is soft. There is no mass. Tenderness: There is no abdominal tenderness. Musculoskeletal: Normal range of motion. Skin:     General: Skin is warm and dry. Capillary Refill: Capillary refill takes less than 2 seconds. Neurological:      General: No focal deficit present. Mental Status: She is alert and oriented to person, place, and time. Psychiatric:         Mood and Affect: Mood normal.         Behavior: Behavior normal.         Thought Content: Thought content normal.      Comments: Visibly anxious            Assessment & Plan   Erasmo Vera was seen today for annual exam.    Diagnoses and all orders for this visit:    Anxiety  -Pt reluctant to med changes at this time as desvenlafaxine is helping. I recommend resuming counseling to augment medication. I also suggested attempting to arrange couples'/marriage counseling to improve communication between them and help resolve current conflicts  -     [facility]    Rec[facility] major [facility], [facility]  -     [facility]    Hypothyroidism, unspecified type  -Recorded in pm[facility], not on medication. Last several TSH levels wnl.   -     TSH WITH REFLEX TO FT4; Future    Well adult exam  -Preventative medicine topics reviewed for pt age group. Counseled on healthy diet and exercise recommendations. Return in about 1 year (around 10/20/2021). Over 50% of the total visit time of 35 minutes was spent on counseling and or coordination of care of anxiety and depression. All questions were answered.   Medications, including possible adverse effects, and instructions were reviewed and  understanding was confirmed. Follow-up recommendations, including when to contact or return to office (ie; if symptoms worsen or fail to improve), were discussed and acknowledged.     Electronically signed by Fany Briscoe MD on 10/20/20 at 4:01 PM CDT

## 2020-10-21 ENCOUNTER — TELEPHONE (OUTPATIENT)
Dept: ADMINISTRATIVE | Age: 45
End: 2020-10-21

## 2020-10-21 PROBLEM — F41.9 ANXIETY: Status: ACTIVE | Noted: 2020-10-21

## 2020-10-21 ASSESSMENT — ENCOUNTER SYMPTOMS
SHORTNESS OF BREATH: 0
EYE PAIN: 0
SORE THROAT: 0
DIARRHEA: 0
NAUSEA: 0
COUGH: 0
EYE DISCHARGE: 0
ABDOMINAL PAIN: 0
RHINORRHEA: 0

## 2020-11-17 RX ORDER — DESVENLAFAXINE 25 MG/1
50 TABLET, EXTENDED RELEASE ORAL DAILY
Qty: 60 TABLET | Refills: 5 | Status: SHIPPED | OUTPATIENT
Start: 2020-11-17 | End: 2021-01-20

## 2020-11-30 ENCOUNTER — E-VISIT (OUTPATIENT)
Dept: PRIMARY CARE CLINIC | Age: 45
End: 2020-11-30

## 2020-11-30 ASSESSMENT — LIFESTYLE VARIABLES: SMOKING_STATUS: NO, I'VE NEVER SMOKED

## 2020-12-02 RX ORDER — MONTELUKAST SODIUM 10 MG/1
10 TABLET ORAL NIGHTLY
Qty: 30 TABLET | Refills: 1 | Status: SHIPPED | OUTPATIENT
Start: 2020-12-02 | End: 2021-06-20

## 2021-01-20 ENCOUNTER — OFFICE VISIT (OUTPATIENT)
Dept: PRIMARY CARE CLINIC | Age: 46
End: 2021-01-20
Payer: COMMERCIAL

## 2021-01-20 VITALS
DIASTOLIC BLOOD PRESSURE: 82 MMHG | OXYGEN SATURATION: 99 % | BODY MASS INDEX: 31.16 KG/M2 | RESPIRATION RATE: 20 BRPM | WEIGHT: 187 LBS | TEMPERATURE: 97.7 F | HEART RATE: 88 BPM | SYSTOLIC BLOOD PRESSURE: 122 MMHG | HEIGHT: 65 IN

## 2021-01-20 DIAGNOSIS — M79.7 FIBROMYALGIA: ICD-10-CM

## 2021-01-20 DIAGNOSIS — R53.83 FATIGUE, UNSPECIFIED TYPE: Primary | ICD-10-CM

## 2021-01-20 DIAGNOSIS — F32.A DEPRESSION, UNSPECIFIED DEPRESSION TYPE: ICD-10-CM

## 2021-01-20 LAB
ALBUMIN SERPL-MCNC: 4.5 G/DL (ref 3.5–5.2)
ALP BLD-CCNC: 91 U/L (ref 35–104)
ALT SERPL-CCNC: 34 U/L (ref 5–33)
ANION GAP SERPL CALCULATED.3IONS-SCNC: 8 MMOL/L (ref 7–19)
AST SERPL-CCNC: 27 U/L (ref 5–32)
BASOPHILS ABSOLUTE: 0.1 K/UL (ref 0–0.2)
BASOPHILS RELATIVE PERCENT: 0.6 % (ref 0–1)
BILIRUB SERPL-MCNC: <0.2 MG/DL (ref 0.2–1.2)
BUN BLDV-MCNC: 10 MG/DL (ref 6–20)
C-REACTIVE PROTEIN: 0.25 MG/DL (ref 0–0.5)
CALCIUM SERPL-MCNC: 9.3 MG/DL (ref 8.6–10)
CHLORIDE BLD-SCNC: 103 MMOL/L (ref 98–111)
CO2: 27 MMOL/L (ref 22–29)
CREAT SERPL-MCNC: 0.5 MG/DL (ref 0.5–0.9)
EOSINOPHILS ABSOLUTE: 0.3 K/UL (ref 0–0.6)
EOSINOPHILS RELATIVE PERCENT: 3 % (ref 0–5)
GFR AFRICAN AMERICAN: >59
GFR NON-AFRICAN AMERICAN: >60
GLUCOSE BLD-MCNC: 79 MG/DL (ref 74–109)
HBA1C MFR BLD: 5.5 % (ref 4–6)
HCT VFR BLD CALC: 42.4 % (ref 37–47)
HEMOGLOBIN: 13.4 G/DL (ref 12–16)
IMMATURE GRANULOCYTES #: 0.1 K/UL
LYMPHOCYTES ABSOLUTE: 3.5 K/UL (ref 1.1–4.5)
LYMPHOCYTES RELATIVE PERCENT: 36.6 % (ref 20–40)
MCH RBC QN AUTO: 29.6 PG (ref 27–31)
MCHC RBC AUTO-ENTMCNC: 31.6 G/DL (ref 33–37)
MCV RBC AUTO: 93.8 FL (ref 81–99)
MONOCYTES ABSOLUTE: 0.8 K/UL (ref 0–0.9)
MONOCYTES RELATIVE PERCENT: 8.8 % (ref 0–10)
NEUTROPHILS ABSOLUTE: 4.8 K/UL (ref 1.5–7.5)
NEUTROPHILS RELATIVE PERCENT: 50.5 % (ref 50–65)
PDW BLD-RTO: 12.5 % (ref 11.5–14.5)
PLATELET # BLD: 361 K/UL (ref 130–400)
PMV BLD AUTO: 9.5 FL (ref 9.4–12.3)
POTASSIUM SERPL-SCNC: 4.5 MMOL/L (ref 3.5–5)
RBC # BLD: 4.52 M/UL (ref 4.2–5.4)
RHEUMATOID FACTOR: 10 IU/ML
SEDIMENTATION RATE, ERYTHROCYTE: 17 MM/HR (ref 0–20)
SODIUM BLD-SCNC: 138 MMOL/L (ref 136–145)
TOTAL PROTEIN: 7.3 G/DL (ref 6.6–8.7)
WBC # BLD: 9.6 K/UL (ref 4.8–10.8)

## 2021-01-20 PROCEDURE — 99215 OFFICE O/P EST HI 40 MIN: CPT | Performed by: STUDENT IN AN ORGANIZED HEALTH CARE EDUCATION/TRAINING PROGRAM

## 2021-01-20 RX ORDER — DULOXETIN HYDROCHLORIDE 20 MG/1
20 CAPSULE, DELAYED RELEASE ORAL DAILY
Qty: 30 CAPSULE | Refills: 3 | Status: SHIPPED | OUTPATIENT
Start: 2021-01-20 | End: 2021-03-15

## 2021-01-20 NOTE — PROGRESS NOTES
[address] [facility]  [address] [address] [address] [address]  Dept: [phone]  Dept Fax: [phone]  Loc: [phone]      Subjective:     Chief Complaint   Patient presents with    Fatigue    Pain     joint pain        HPI:  Gurdeep Chavez is a 40 y.o. female presenting for    Fatigue  -Describes symptoms that have been going on for several years with waxing and waning in intensity. Overall sense of tiredness that gets worse as the day goes on. Describes vague joint pain around knees and shoulders and lower back. Patient notes remote history of Lyme disease around 10 to 12 years ago. Patient has been diagnosed with fibromyalgia at some point in the past and states that she was prescribed Lyrica however that made her head feel weird. She has a history of depression and anxiety and is currently taking desvenlafaxine 50 mg which she says has helped some. 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 She is recently bought 2 horses and a trailer and a lot of bala from riding. No fevers, chills, night sweats, unintended weight loss. No suicidal ideation.     ROS:   Review of Systems  Reviewed with patient and noted to be negative except that listed in HPI    PMHx:  Past Medical History:   Diagnosis Date    Anxiety     Asthma     Depression 1/13/2019    Hypothyroid 6/24/2013    Thyroid disorder     Yeast infection of nipple, postpartum 3/20/2014     Patient Active Problem List   Diagnosis    Hypothyroid    S/P tubal ligation    Right ankle instability    Fibromyalgia    Other fatigue    Other chronic pain    Arthralgia    Depression    Hordeolum externum of left lower eyelid    Chronic pain of right ankle    Anxiety       PSHx:  Past Surgical History:   Procedure Laterality Date    LAPAROSCOPY  06/11/2015    LIGAMENT REPAIR Right 12/29/2015    RIGHT ANKLE BROSTROM-LENTZ  performed by Ryan Del Rio MD at [address] [place] [address]  PARTIAL HYSTERECTOMY  7/13/15    Di Naveen    PILI AND BSO      TUBAL LIGATION  3/14    TUBAL LIGATION      WISDOM TOOTH EXTRACTION  2011       PFHx:  Family History   Problem Relation Age of Onset    Heart Disease Mother     Diabetes Mother     Cancer Father     High Cholesterol Father        SocialHx:  Social History     Tobacco Use    Smoking status: Never Smoker    Smokeless tobacco: Never Used   Substance Use Topics    Alcohol use: No       Allergies: Allergies   Allergen Reactions    Adhesive Tape      blisters    Molds & Smuts Dermatitis, Hives, Itching, Rash, Shortness Of Breath and Swelling    Pollen Extract Dermatitis, Hives, Itching, Rash, Shortness Of Breath and Swelling    Wheat Bran      Makes sick at stomach      Wasp Venom Hives, Itching, Nausea And Vomiting and Swelling       Medications:  Current Outpatient Medications   Medication Sig Dispense Refill    montelukast (SINGULAIR) 10 MG tablet Take 1 tablet by mouth nightly SCHEDULE AN APPOINTMENT TO CONTINUE RECEIVING REFILLS 30 tablet 1    Omega-3 Fatty Acids (FISH OIL) 1000 MG CPDR Take 3,000 mg by mouth      vitamin D (ERGOCALCIFEROL) 23803 units CAPS capsule Take one capsule by mouth once weekly for 6 weeks, then begin 5,000 units OTC daily. 6 capsule 0    Cetirizine HCl (ZYRTEC ALLERGY) 10 MG CAPS Take 10 mg by mouth 2 times daily as needed       RaNITidine HCl (ZANTAC PO) Take by mouth 2 times daily as needed        No current facility-administered medications for this visit. Objective:   PE:  /82   Pulse 88   Temp 97.7 °F (36.5 °C) (Temporal)   Resp 20   Ht 5' 5\" (1.651 m)   Wt 187 lb (84.8 kg)   LMP 05/28/2015   SpO2 99%   BMI 31.12 kg/m²   Physical Exam  Constitutional:       General: She is not in acute distress. Appearance: Normal appearance. HENT:      Head: Normocephalic.       Nose: Nose normal.      Mouth/Throat:      Mouth: Mucous membranes are moist. Pharynx: Oropharynx is clear. No oropharyngeal exudate or posterior oropharyngeal erythema. Eyes:      General: No scleral icterus. Extraocular Movements: Extraocular movements intact. Conjunctiva/sclera: Conjunctivae normal.   Neck:      Musculoskeletal: Normal range of motion. Cardiovascular:      Rate and Rhythm: Normal rate and regular rhythm. Pulses: Normal pulses. Heart sounds: No murmur. Pulmonary:      Effort: Pulmonary effort is normal.   Abdominal:      General: There is no distension. Palpations: Abdomen is soft. There is no mass. Tenderness: There is no abdominal tenderness. Musculoskeletal: Normal range of motion. Back:         Arms:         Legs:       Comments: Points of tenderness as are as shown in diagrams   Skin:     General: Skin is warm and dry. Capillary Refill: Capillary refill takes less than 2 seconds. Neurological:      General: No focal deficit present. Mental Status: She is alert and oriented to person, place, and time. Psychiatric:         Mood and Affect: Mood normal.         Behavior: Behavior normal.         Thought Content: Thought content normal.            Assessment & Plan   [facility] was [facility] today for fatigue and pain. Diagnoses and all orders for this visit:    Fatigue, unspecified type  Depression  Fibromyalgia  -Reevaluate for underlying organic pathology to explain fatigue and other symptoms. Fibromyalgia diagnosis is supported by today's findings. Discontinue desvenlafaxine. Start Cymbalta. If well tolerated after 1 week can increase to 40 mg. We will follow-up in 2 to 3 weeks  -     CBC Auto Differential; Future  -     Hemoglobin A1C; Future  -     Comprehensive Metabolic Panel; Future  -     C-Reactive Protein; Future  -     Sedimentation Rate; Future  -     Rheumatoid Factor; Future  -     TG Screen with Reflex;  Future -     DULoxetine (CYMBALTA) 20 MG extended release capsule; Take 1 capsule by mouth daily    Return in about 2 weeks (around 2/3/2021). Over 50% of the total visit time of 45 minutes was spent on counseling and or coordination of care of    Diagnosis Orders   1. Fatigue, unspecified type     2. Fibromyalgia  CBC Auto Differential    Hemoglobin A1C    Comprehensive Metabolic Panel    C-Reactive Protein    Sedimentation Rate    Rheumatoid Factor    TG Screen with Reflex    DULoxetine (CYMBALTA) 20 MG extended release capsule   3. Depression, unspecified depression type           All questions were answered. Medications, including possible adverse effects, and instructions were reviewed and  understanding was confirmed. Follow-up recommendations, including when to contact or return to office (ie; if symptoms worsen or fail to improve), were discussed and acknowledged.     Electronically signed by Chrissy Velez MD on 1/20/21 at 2:16 PM CST

## 2021-01-22 DIAGNOSIS — J32.9 CHRONIC SINUSITIS, UNSPECIFIED LOCATION: Primary | ICD-10-CM

## 2021-01-23 LAB — ANA IGG, ELISA: DETECTED

## 2021-01-24 LAB
ANTINUCLEAR AB INTERPRETIVE COMMENT: NORMAL
ANTINUCLEAR ANTIBODY, HEP-2, IGG: NORMAL

## 2021-02-01 ENCOUNTER — TELEPHONE (OUTPATIENT)
Dept: PRIMARY CARE CLINIC | Age: 46
End: 2021-02-01

## 2021-02-01 NOTE — TELEPHONE ENCOUNTER
----- Message from Nerissa Godoy MD sent at 1/27/2021  5:08 PM CST -----  Please let pt know that labs were mostly normal. Of the screening labs for autoimmune things, one screening one, the TG, came back abnormal however when f/u labs were completed for this they came back normal. This would be consistent w/ fibromyalgia. We will talk about this at our next visit. I spoke with pt and gave the results. Pt voiced she understood, I told her if she had any other questions or concern to give us a call back but if not Dr Herminio Hodges will discuss this at her next visit.

## 2021-03-15 DIAGNOSIS — M79.7 FIBROMYALGIA: ICD-10-CM

## 2021-03-15 RX ORDER — DULOXETIN HYDROCHLORIDE 20 MG/1
CAPSULE, DELAYED RELEASE ORAL
Qty: 30 CAPSULE | Refills: 3 | Status: SHIPPED | OUTPATIENT
Start: 2021-03-15 | End: 2021-03-30 | Stop reason: SDUPTHER

## 2021-03-30 DIAGNOSIS — M79.7 FIBROMYALGIA: ICD-10-CM

## 2021-03-30 RX ORDER — DULOXETIN HYDROCHLORIDE 20 MG/1
20 CAPSULE, DELAYED RELEASE ORAL DAILY
Qty: 90 CAPSULE | Refills: 3 | Status: SHIPPED | OUTPATIENT
Start: 2021-03-30 | End: 2021-03-30 | Stop reason: SDUPTHER

## 2021-03-30 RX ORDER — DULOXETIN HYDROCHLORIDE 20 MG/1
20 CAPSULE, DELAYED RELEASE ORAL DAILY
Qty: 30 CAPSULE | Refills: 3 | Status: SHIPPED | OUTPATIENT
Start: 2021-03-30 | End: 2021-03-30 | Stop reason: SDUPTHER

## 2021-03-30 RX ORDER — DULOXETIN HYDROCHLORIDE 20 MG/1
CAPSULE, DELAYED RELEASE ORAL
Qty: 90 CAPSULE | Refills: 3 | Status: SHIPPED | OUTPATIENT
Start: 2021-03-30 | End: 2021-07-02

## 2021-04-01 ENCOUNTER — VIRTUAL VISIT (OUTPATIENT)
Dept: PRIMARY CARE CLINIC | Age: 46
End: 2021-04-01
Payer: COMMERCIAL

## 2021-04-01 DIAGNOSIS — E66.09 CLASS 1 OBESITY DUE TO EXCESS CALORIES WITHOUT SERIOUS COMORBIDITY WITH BODY MASS INDEX (BMI) OF 31.0 TO 31.9 IN ADULT: ICD-10-CM

## 2021-04-01 DIAGNOSIS — M79.7 FIBROMYALGIA: Primary | ICD-10-CM

## 2021-04-01 DIAGNOSIS — F33.9 RECURRENT MAJOR DEPRESSIVE DISORDER, REMISSION STATUS UNSPECIFIED (HCC): ICD-10-CM

## 2021-04-01 PROCEDURE — 99214 OFFICE O/P EST MOD 30 MIN: CPT | Performed by: STUDENT IN AN ORGANIZED HEALTH CARE EDUCATION/TRAINING PROGRAM

## 2021-04-01 NOTE — PROGRESS NOTES
2021    TELEHEALTH EVALUATION -- Audio/Visual (During [facility] public health emergency)    HPI:    Ruben Felton (:  1975) has requested an audio/video evaluation for the following concern(s):      Weight loss  Fibromyalgia  Depression  Initially inquiring about medication to help w/ weight loss. Over last 2 weeks has started eating more fresh/cooked veggies, drinking more water, limiting calories to 7039-3264/day. She says she has lost 6 lbs. She feels better after starting cymbalta, now taking 40mg. This has helped w/ moods and pain. She is riding horses more now. No other concerns today. Review of Systems  Reviewed with patient and noted to be negative except that listed in HPI    Prior to Visit Medications    Medication Sig Taking? Authorizing Provider   DULoxetine (CYMBALTA) 20 MG extended release capsule Take 2 times daily and a third if needed PRN  Michael Lockhart MD   montelukast (SINGULAIR) 10 MG tablet Take 1 tablet by mouth nightly SCHEDULE AN APPOINTMENT TO CONTINUE RECEIVING REFILLS  PELON Cole   Omega-3 Fatty Acids (FISH OIL) 1000 MG CPDR Take 3,000 mg by mouth  Historical Provider, MD   vitamin D (ERGOCALCIFEROL) 92987 units CAPS capsule Take one capsule by mouth once weekly for 6 weeks, then begin 5,000 units OTC daily.   PELON Cole   Cetirizine HCl (ZYRTEC ALLERGY) 10 MG CAPS Take 10 mg by mouth 2 times daily as needed   Historical Provider, MD       Social History     Tobacco Use    Smoking status: Never Smoker    Smokeless tobacco: Never Used   Substance Use Topics    Alcohol use: No    Drug use: No        Allergies   Allergen Reactions    Adhesive Tape      blisters    Molds & Smuts Dermatitis, Hives, Itching, Rash, Shortness Of Breath and Swelling    Pollen Extract Dermatitis, Hives, Itching, Rash, Shortness Of Breath and Swelling    Wheat Bran      Makes sick at stomach      Wasp Venom Hives, Itching, Nausea And Vomiting and Swelling   , Past Medical History:   Diagnosis Date    Anxiety     Asthma     Depression 1/13/2019    Hypothyroid 6/24/2013    Thyroid disorder     Yeast infection of nipple, postpartum 3/20/2014   ,   Past Surgical History:   Procedure Laterality Date    LAPAROSCOPY  06/11/2015    LIGAMENT REPAIR Right 12/29/2015    RIGHT ANKLE BROSTROM-LENTZ  performed by Sakina Harris MD at [address] [place] [address]  7/13/15    Vaughna Madelin Hodge    PILI AND BSO      TUBAL LIGATION  3/14    TUBAL LIGATION      WISDOM TOOTH EXTRACTION  2011   ,   Social History     Tobacco Use    Smoking status: Never Smoker    Smokeless tobacco: Never Used   Substance Use Topics    Alcohol use: No    Drug use: No   ,   Family History   Problem Relation Age of Onset    Heart Disease Mother     Diabetes Mother     Cancer Father     High Cholesterol Father    ,   Immunization History   Administered Date(s) Administered    DTaP 12/18/2013    Influenza Vaccine, unspecified formulation 10/18/2017    Influenza Virus Vaccine 10/24/2018, 11/12/2019   ,   Health Maintenance   Topic Date Due    Hepatitis C screen  Never done    COVID-19 Vaccine (1) Never done    Flu vaccine (Season Ended) 09/01/2021    TSH testing  10/20/2021    DTaP/Tdap/Td vaccine (2 - Tdap) 12/18/2023    Lipid screen  09/03/2025    HIV screen  Completed    Hepatitis A vaccine  Aged Out    Hepatitis B vaccine  Aged Out    Hib vaccine  Aged Out    Meningococcal (ACWY) vaccine  Aged Out    Pneumococcal 0-64 years Vaccine  Aged Out       PHYSICAL EXAMINATION:  [ INSTRUCTIONS:  \"[x]\" Indicates a positive item  \"[]\" Indicates a negative item  -- DELETE ALL ITEMS NOT EXAMINED]  Vital Signs: (As obtained by patient/caregiver or practitioner observation)    Blood pressure-  Heart rate-    Respiratory rate-    Temperature-  Pulse oximetry-     Constitutional: [x] Appears well-developed and well-nourished [x] No apparent distress      [] Abnormal-   Mental status  [x] Alert and awake  [] Oriented to person/place/time []Able to follow commands      Eyes:  EOM    [x]  Normal  [] Abnormal-  Sclera  [x]  Normal  [] Abnormal -         Discharge [x]  None visible  [] Abnormal -    HENT:   [x] Normocephalic, atraumatic. [] Abnormal   [x] Mouth/Throat: Mucous membranes are moist.     External Ears [x] Normal  [] Abnormal-     Neck: [x] No visualized mass     Pulmonary/Chest: [x] Respiratory effort normal.  [x] No visualized signs of difficulty breathing or respiratory distress        [] Abnormal-      Musculoskeletal:   [x] Normal gait with no signs of ataxia         [x] Normal range of motion of neck        [] Abnormal-       Neurological:        [x] No Facial Asymmetry (Cranial nerve 7 motor function) (limited exam to video visit)          [x] No gaze palsy        [] Abnormal-         Skin:        [x] No significant exanthematous lesions or discoloration noted on facial skin         [] Abnormal-            Psychiatric:       [x] Normal Affect [x] No Hallucinations        [] Abnormal-     Other pertinent observable physical exam findings-       ASSESSMENT/PLAN:  1. Fibromyalgia  -Improved. Continue cymbalta 40mg    2. Recurrent major depressive disorder, remission status unspecified ([facility]  -Improved. Continue cymbalta 40mg    3. Class 1 obesity due to excess calories without serious comorbidity with body mass index (BMI) of 31.0 to 31.9 in adult  -Pt doing well w/ current wt loss efforts. Encouragement given, will f/u in 3 mo to assess whether she is reaching goals or not. Can consider medication, liraglutide or phentermine. Return in about 3 months (around 7/1/2021) for wt loss/fibro f/u. Alena Sanchez, was evaluated through a synchronous (real-time) audio-video encounter. The patient (or guardian if applicable) is aware that this is a billable service. Verbal consent to proceed has been obtained within the past 12 months.  The visit was conducted pursuant to the emergency declaration under the [facility], [address] [place] [address] waiver authority and the [employer] General Act. Patient identification was verified, and a caregiver was present when appropriate. The patient was located in a state where the provider was credentialed to provide care. Total time spent on this encounter: Not billed by time    --Milton Ng MD on 4/1/2021 at 2:37 PM    An electronic signature was used to authenticate this note.

## 2021-04-09 RX ORDER — LORATADINE AND PSEUDOEPHEDRINE SULFATE 10; 240 MG/1; MG/1
1 TABLET, EXTENDED RELEASE ORAL DAILY
Qty: 30 TABLET | Refills: 3 | Status: SHIPPED | OUTPATIENT
Start: 2021-04-09 | End: 2022-04-11

## 2021-06-17 ENCOUNTER — E-VISIT (OUTPATIENT)
Dept: FAMILY MEDICINE CLINIC | Age: 46
End: 2021-06-17
Payer: COMMERCIAL

## 2021-06-17 DIAGNOSIS — J30.2 SEASONAL ALLERGIES: Primary | ICD-10-CM

## 2021-06-17 PROCEDURE — 99422 OL DIG E/M SVC 11-20 MIN: CPT | Performed by: FAMILY MEDICINE

## 2021-06-17 RX ORDER — METHYLPREDNISOLONE 4 MG/1
TABLET ORAL
Qty: 1 KIT | Refills: 0 | Status: SHIPPED | OUTPATIENT
Start: 2021-06-17 | End: 2021-07-02

## 2021-06-17 ASSESSMENT — LIFESTYLE VARIABLES: SMOKING_STATUS: NO, I'VE NEVER SMOKED

## 2021-06-17 NOTE — PROGRESS NOTES
HPI: as per patient provided history  Exam: N/A (electronic visit)  ASSESSMENT/PLAN:  1. Seasonal allergies    - methylPREDNISolone (MEDROL, MILTON,) 4 MG tablet; Take by mouth as directed  Dispense: 1 kit; Refill: 0      Patient instructed to call the office if worsens, or fails to improve as anticipated. 11-20 minutes were spent on the digital evaluation and management of this patient.

## 2021-06-21 ENCOUNTER — E-VISIT (OUTPATIENT)
Dept: FAMILY MEDICINE CLINIC | Age: 46
End: 2021-06-21
Payer: COMMERCIAL

## 2021-06-21 DIAGNOSIS — J02.9 SORE THROAT: ICD-10-CM

## 2021-06-21 DIAGNOSIS — R05.9 COUGH: Primary | ICD-10-CM

## 2021-06-21 PROCEDURE — 98970 NQHP OL DIG ASSMT&MGMT 5-10: CPT | Performed by: PHYSICIAN ASSISTANT

## 2021-06-21 RX ORDER — AZITHROMYCIN 250 MG/1
250 TABLET, FILM COATED ORAL SEE ADMIN INSTRUCTIONS
Qty: 6 TABLET | Refills: 0 | Status: SHIPPED | OUTPATIENT
Start: 2021-06-21 | End: 2021-06-26

## 2021-06-21 RX ORDER — BROMPHENIRAMINE MALEATE, PSEUDOEPHEDRINE HYDROCHLORIDE, AND DEXTROMETHORPHAN HYDROBROMIDE 2; 30; 10 MG/5ML; MG/5ML; MG/5ML
5 SYRUP ORAL 4 TIMES DAILY PRN
Qty: 240 ML | Refills: 0 | Status: SHIPPED | OUTPATIENT
Start: 2021-06-21 | End: 2021-07-21

## 2021-06-21 RX ORDER — FLUTICASONE PROPIONATE 50 MCG
2 SPRAY, SUSPENSION (ML) NASAL DAILY
Qty: 1 BOTTLE | Refills: 0 | Status: SHIPPED | OUTPATIENT
Start: 2021-06-21

## 2021-06-21 ASSESSMENT — LIFESTYLE VARIABLES: SMOKING_STATUS: NO, I'VE NEVER SMOKED

## 2021-07-02 ENCOUNTER — OFFICE VISIT (OUTPATIENT)
Dept: PRIMARY CARE CLINIC | Age: 46
End: 2021-07-02
Payer: COMMERCIAL

## 2021-07-02 VITALS
BODY MASS INDEX: 27.99 KG/M2 | TEMPERATURE: 97.8 F | OXYGEN SATURATION: 98 % | RESPIRATION RATE: 20 BRPM | WEIGHT: 168 LBS | HEIGHT: 65 IN | SYSTOLIC BLOOD PRESSURE: 118 MMHG | HEART RATE: 96 BPM | DIASTOLIC BLOOD PRESSURE: 68 MMHG

## 2021-07-02 DIAGNOSIS — E66.09 CLASS 1 OBESITY DUE TO EXCESS CALORIES WITHOUT SERIOUS COMORBIDITY WITH BODY MASS INDEX (BMI) OF 31.0 TO 31.9 IN ADULT: ICD-10-CM

## 2021-07-02 DIAGNOSIS — J32.9 CHRONIC SINUSITIS, UNSPECIFIED LOCATION: Primary | ICD-10-CM

## 2021-07-02 DIAGNOSIS — M79.7 FIBROMYALGIA: ICD-10-CM

## 2021-07-02 DIAGNOSIS — F33.9 RECURRENT MAJOR DEPRESSIVE DISORDER, REMISSION STATUS UNSPECIFIED (HCC): ICD-10-CM

## 2021-07-02 DIAGNOSIS — J30.9 ALLERGIC RHINITIS, UNSPECIFIED SEASONALITY, UNSPECIFIED TRIGGER: ICD-10-CM

## 2021-07-02 PROCEDURE — 99214 OFFICE O/P EST MOD 30 MIN: CPT | Performed by: STUDENT IN AN ORGANIZED HEALTH CARE EDUCATION/TRAINING PROGRAM

## 2021-07-02 RX ORDER — DULOXETINE 40 MG/1
40 CAPSULE, DELAYED RELEASE ORAL DAILY
Qty: 30 CAPSULE | Refills: 3 | Status: SHIPPED | OUTPATIENT
Start: 2021-07-02 | End: 2021-11-14

## 2021-07-02 RX ORDER — AZELASTINE 1 MG/ML
1 SPRAY, METERED NASAL 2 TIMES DAILY
Qty: 2 BOTTLE | Refills: 1 | Status: SHIPPED | OUTPATIENT
Start: 2021-07-02

## 2021-07-02 SDOH — ECONOMIC STABILITY: FOOD INSECURITY: WITHIN THE PAST 12 MONTHS, YOU WORRIED THAT YOUR FOOD WOULD RUN OUT BEFORE YOU GOT MONEY TO BUY MORE.: NEVER TRUE

## 2021-07-02 SDOH — ECONOMIC STABILITY: FOOD INSECURITY: WITHIN THE PAST 12 MONTHS, THE FOOD YOU BOUGHT JUST DIDN'T LAST AND YOU DIDN'T HAVE MONEY TO GET MORE.: NEVER TRUE

## 2021-07-02 ASSESSMENT — SOCIAL DETERMINANTS OF HEALTH (SDOH): HOW HARD IS IT FOR YOU TO PAY FOR THE VERY BASICS LIKE FOOD, HOUSING, MEDICAL CARE, AND HEATING?: NOT HARD AT ALL

## 2021-07-02 NOTE — PROGRESS NOTES
[address] [facility]  [address] [address]  [address] [address]  Dept: [phone]  Dept Fax: [phone]  Loc: [phone]      Subjective:     Chief Complaint   Patient presents with    Weight Loss     3 month follow up     Other     Fibro    Sinus Problem     drainage       HPI:  Michael Kumar is a 40 y.o. female presenting for       Weight loss  Fibromyalgia  Depression  -Has lost 19lb since our last appt here in-office. Watching what she is eating, consuming more fresh vegetables and fruit. Fibro/mood symptoms doing well on cymbalta 40mg. Sinus problems  Was prescribed abx, steroids by e-visit encounter recently, states sx better initially however she indicates allergic-type skin reaction to medrol dose netta C/o significant throat drainage. Taking flonase (2 sprays bid), zyrtec, singulair. Sometimes uses claritin-d. Has seen Dr. Nellie Luna previously, had allergen testing and shots. No f/c or malaise.       ROS:   Review of Systems  Reviewed with patient and noted to be negative except that listed in HPI    PMHx:  Past Medical History:   Diagnosis Date    Anxiety     Asthma     Depression 1/13/2019    Hypothyroid 6/24/2013    Thyroid disorder     Yeast infection of nipple, postpartum 3/20/2014     Patient Active Problem List   Diagnosis    Hypothyroid    S/P tubal ligation    Right ankle instability    Fibromyalgia    Other fatigue    Other chronic pain    Arthralgia    Depression    Hordeolum externum of left lower eyelid    Chronic pain of right ankle    Anxiety       PSHx:  Past Surgical History:   Procedure Laterality Date    LAPAROSCOPY  06/11/2015    LIGAMENT REPAIR Right 12/29/2015    RIGHT ANKLE BROSTROM-LENTZ  performed by Andres Griffith MD at [address] [place] [address]  7/13/15    Brendon Hodge    [address] [place] [address] AND BSO      TUBAL LIGATION  3/14    TUBAL LIGATION      WISDOM TOOTH EXTRACTION  2011       PFHx:  Family History   Problem Relation Age of Onset    Heart Disease Mother     Diabetes Mother     Cancer Father     High Cholesterol Father        SocialHx:  Social History     Tobacco Use    Smoking status: Never Smoker    Smokeless tobacco: Never Used   Substance Use Topics    Alcohol use: No       Allergies: Allergies   Allergen Reactions    Adhesive Tape      blisters    Molds & Smuts Dermatitis, Hives, Itching, Rash, Shortness Of Breath and Swelling    Pollen Extract Dermatitis, Hives, Itching, Rash, Shortness Of Breath and Swelling    Wheat Bran      Makes sick at stomach      Wasp Venom Hives, Itching, Nausea And Vomiting and Swelling       Medications:  Current Outpatient Medications   Medication Sig Dispense Refill    azelastine (ASTELIN) 0.1 % nasal spray 1 spray by Nasal route 2 times daily Use in each nostril as directed 2 Bottle 1    DULoxetine 40 MG CPEP Take 40 mg by mouth daily Take 2 times daily and a third if needed PRN 30 capsule 3    brompheniramine-pseudoephedrine-DM (BROMFED DM) 2-30-10 MG/5ML syrup Take 5 mLs by mouth 4 times daily as needed for Congestion or Cough 240 mL 0    fluticasone (FLONASE) 50 MCG/ACT nasal spray 2 sprays by Nasal route daily 1 Bottle 0    montelukast (SINGULAIR) 10 MG tablet TAKE 1 TABLET BY MOUTH NIGHTLY. SCHEDULE AN APPOINTMENT TO CONTINUE RECEIVING REFILLS. 30 tablet 0    loratadine-pseudoephedrine (CLARITIN-D 24 HOUR)  MG per extended release tablet Take 1 tablet by mouth daily 30 tablet 3    Omega-3 Fatty Acids (FISH OIL) 1000 MG CPDR Take 3,000 mg by mouth      vitamin D (ERGOCALCIFEROL) 72811 units CAPS capsule Take one capsule by mouth once weekly for 6 weeks, then begin 5,000 units OTC daily.  6 capsule 0    Cetirizine HCl (ZYRTEC ALLERGY) 10 MG CAPS Take 10 mg by mouth 2 times daily as needed       cefUROXime (CEFTIN) 500 MG tablet Take 1 tablet by mouth 2 times daily for 7 days 14 tablet 0    benzonatate (TESSALON) 200 MG capsule Take 1 capsule by mouth 3 times daily as needed for Cough 30 capsule 0    predniSONE (DELTASONE) 10 MG tablet Take 4 tablets by mouth daily for 2 days, THEN 2 tablets daily for 2 days, THEN 1 tablet daily for 2 days, THEN 0.5 tablets daily for 2 days. 15 tablet 0     No current facility-administered medications for this visit. Objective:   PE:  /68   Pulse 96   Temp 97.8 °F (36.6 °C) (Temporal)   Resp 20   Ht 5' 5\" (1.651 m)   Wt 168 lb (76.2 kg)   LMP 05/28/2015   SpO2 98%   BMI 27.96 kg/m²   Physical Exam  Constitutional:       General: She is not in acute distress. Appearance: Normal appearance. HENT:      Head: Normocephalic. Nose: Nose normal.      Mouth/Throat:      Mouth: Mucous membranes are moist.      Pharynx: Oropharynx is clear. No oropharyngeal exudate or posterior oropharyngeal erythema. Eyes:      General: No scleral icterus. Extraocular Movements: Extraocular movements intact. Conjunctiva/sclera: Conjunctivae normal.   Cardiovascular:      Rate and Rhythm: Normal rate and regular rhythm. Pulses: Normal pulses. Heart sounds: No murmur heard. Pulmonary:      Effort: Pulmonary effort is normal.      Breath sounds: Normal breath sounds. Abdominal:      General: There is no distension. Palpations: Abdomen is soft. There is no mass. Tenderness: There is no abdominal tenderness. Musculoskeletal:         General: Normal range of motion. Cervical back: Normal range of motion. Skin:     General: Skin is warm and dry. Capillary Refill: Capillary refill takes less than 2 seconds. Neurological:      General: No focal deficit present. Mental Status: She is alert and oriented to person, place, and time. Psychiatric:         Mood and Affect: Mood normal.         Behavior: Behavior normal.         Thought Content: Thought content normal.            Assessment & Plan   Marino Farnsworth was seen today for weight loss, other and sinus problem.     Diagnoses and all orders for this visit:    Chronic sinusitis, unspecified location  Allergic rhinitis, unspecified seasonality, unspecified trigger  -Will monitor sx over next few days. If not improving will send tapered prednisone course. Recommend she revisit Dr. Paola Moreira. May consider CT sinuses. Given recent abx therapy and clinical presentation do not feel need for continued antibiotics at this time. -     azelastine (ASTELIN) 0.1 % nasal spray; 1 spray by Nasal route 2 times daily Use in each nostril as directed    Fibromyalgia  -Sx controlled  -     DULoxetine 40 MG CPEP; Take 40 mg by mouth daily Take 2 times daily and a third if needed PRN    Recurrent major depressive disorder, remission status unspecified (HCC)  -Sx controlled  -     DULoxetine 40 MG CPEP; Take 40 mg by mouth daily Take 2 times daily and a third if needed PRN    Class 1 obesity due to excess calories without serious comorbidity with body mass index (BMI) of 31.0 to 31.9 in adult  -Improving with conservative diet/lifestyle changes    All questions were answered. Medications, including possible adverse effects, and instructions were reviewed and  understanding was confirmed. Follow-up recommendations, including when to contact or return to office (ie; if symptoms worsen or fail to improve), were discussed and acknowledged.     Electronically signed by Shyann Islas MD on 7/2/21 at 1:06 PM CDT

## 2021-07-06 ENCOUNTER — E-VISIT (OUTPATIENT)
Dept: FAMILY MEDICINE CLINIC | Age: 46
End: 2021-07-06
Payer: COMMERCIAL

## 2021-07-06 DIAGNOSIS — R05.9 COUGH: ICD-10-CM

## 2021-07-06 DIAGNOSIS — J01.91 ACUTE RECURRENT SINUSITIS, UNSPECIFIED LOCATION: Primary | ICD-10-CM

## 2021-07-06 PROCEDURE — 98970 NQHP OL DIG ASSMT&MGMT 5-10: CPT | Performed by: NURSE PRACTITIONER

## 2021-07-06 RX ORDER — BENZONATATE 200 MG/1
200 CAPSULE ORAL 3 TIMES DAILY PRN
Qty: 30 CAPSULE | Refills: 0 | Status: SHIPPED | OUTPATIENT
Start: 2021-07-06 | End: 2021-07-13

## 2021-07-06 RX ORDER — PREDNISONE 10 MG/1
TABLET ORAL
Qty: 15 TABLET | Refills: 0 | Status: SHIPPED | OUTPATIENT
Start: 2021-07-06 | End: 2021-07-14

## 2021-07-06 RX ORDER — CEFUROXIME AXETIL 500 MG/1
500 TABLET ORAL 2 TIMES DAILY
Qty: 14 TABLET | Refills: 0 | Status: SHIPPED | OUTPATIENT
Start: 2021-07-06 | End: 2021-07-13

## 2021-07-06 ASSESSMENT — LIFESTYLE VARIABLES: SMOKING_STATUS: NO, I'VE NEVER SMOKED

## 2021-08-31 ENCOUNTER — PATIENT MESSAGE (OUTPATIENT)
Dept: PRIMARY CARE CLINIC | Age: 46
End: 2021-08-31

## 2021-08-31 NOTE — TELEPHONE ENCOUNTER
From: Michael Kumar  To: Cande Xie MD  Sent: 8/31/2021 11:32 AM CDT  Subject: Prescription Question    I am needing to know if I can have something PRN for anxiety. One of my best friends is on the ventilator here at the hospital with COVID and we expect her to pass anytime. Im not feeling so good. 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 Im stressed.

## 2021-09-03 RX ORDER — BUSPIRONE HYDROCHLORIDE 10 MG/1
10 TABLET ORAL 3 TIMES DAILY PRN
Qty: 90 TABLET | Refills: 0 | Status: SHIPPED | OUTPATIENT
Start: 2021-09-03 | End: 2021-10-03

## 2021-10-21 ENCOUNTER — OFFICE VISIT (OUTPATIENT)
Dept: PRIMARY CARE CLINIC | Age: 46
End: 2021-10-21
Payer: COMMERCIAL

## 2021-10-21 VITALS
TEMPERATURE: 97.3 F | HEART RATE: 64 BPM | HEIGHT: 65 IN | DIASTOLIC BLOOD PRESSURE: 82 MMHG | OXYGEN SATURATION: 97 % | WEIGHT: 180 LBS | RESPIRATION RATE: 18 BRPM | BODY MASS INDEX: 29.99 KG/M2 | SYSTOLIC BLOOD PRESSURE: 124 MMHG

## 2021-10-21 DIAGNOSIS — Z23 NEED FOR INFLUENZA VACCINATION: ICD-10-CM

## 2021-10-21 DIAGNOSIS — M79.7 FIBROMYALGIA: Primary | ICD-10-CM

## 2021-10-21 DIAGNOSIS — E66.09 CLASS 1 OBESITY DUE TO EXCESS CALORIES WITHOUT SERIOUS COMORBIDITY WITH BODY MASS INDEX (BMI) OF 31.0 TO 31.9 IN ADULT: ICD-10-CM

## 2021-10-21 DIAGNOSIS — J30.9 ALLERGIC RHINITIS, UNSPECIFIED SEASONALITY, UNSPECIFIED TRIGGER: ICD-10-CM

## 2021-10-21 DIAGNOSIS — F33.9 RECURRENT MAJOR DEPRESSIVE DISORDER, REMISSION STATUS UNSPECIFIED (HCC): ICD-10-CM

## 2021-10-21 PROBLEM — R53.83 OTHER FATIGUE: Status: RESOLVED | Noted: 2019-01-13 | Resolved: 2021-10-21

## 2021-10-21 PROCEDURE — 90471 IMMUNIZATION ADMIN: CPT | Performed by: STUDENT IN AN ORGANIZED HEALTH CARE EDUCATION/TRAINING PROGRAM

## 2021-10-21 PROCEDURE — 99214 OFFICE O/P EST MOD 30 MIN: CPT | Performed by: STUDENT IN AN ORGANIZED HEALTH CARE EDUCATION/TRAINING PROGRAM

## 2021-10-21 PROCEDURE — 90674 CCIIV4 VAC NO PRSV 0.5 ML IM: CPT | Performed by: STUDENT IN AN ORGANIZED HEALTH CARE EDUCATION/TRAINING PROGRAM

## 2021-10-21 NOTE — PROGRESS NOTES
After obtaining consent, and per orders of , injection of Flu given in Left deltoid by Chino Crump MA. Patient instructed to remain in clinic for 20 minutes afterwards, and to report any adverse reaction to me immediately.

## 2021-10-21 NOTE — PROGRESS NOTES
[facility]  [address] [address]  [address] [address]  Dept: [phone]  Dept Fax: [phone]  Loc: [phone]      Subjective:     Chief Complaint   Patient presents with    Annual Exam     no concerns        HPI:  Rupa Cheung is a 40 y.o. female presenting for    Weight loss  Fibromyalgia  Depression  -Has been working on her diet. Weight has been up and down but generally downtrending. She is watching what she is eating, consuming more fresh vegetables and fruit. Fibro/mood symptoms doing well on cymbalta 80mg.  is moving home for contracted job will be home for 7-8 years. Pt has been saddened recently by death of close friend from [phone]. She is open to counseling. She follows up with her GYN for well-woman care. Allergy sx controlled. No other concerns today.       ROS:   Review of Systems  Reviewed with patient and noted to be negative except that listed in HPI    PMHx:  Past Medical History:   Diagnosis Date    Anxiety     Asthma     Depression 1/13/2019    Hypothyroid 6/24/2013    Thyroid disorder     Yeast infection of nipple, postpartum 3/20/2014     Patient Active Problem List   Diagnosis    S/P tubal ligation    Right ankle instability    Fibromyalgia    Other chronic pain    Arthralgia    Depression    Hordeolum externum of left lower eyelid    Chronic pain of right ankle    Anxiety       PSHx:  Past Surgical History:   Procedure Laterality Date    LAPAROSCOPY  06/11/2015    LIGAMENT REPAIR Right 12/29/2015    RIGHT ANKLE BROSTROM-LENTZ  performed by Tad Larson MD at [address] [place] [address]  7/13/15    [facility] ALEJANDRE AND BSO      TUBAL LIGATION  3/14    TUBAL LIGATION      WISDOM TOOTH EXTRACTION  2011       PFHx:  Family History   Problem Relation Age of Onset    Heart Disease Mother     Diabetes Mother     Cancer Father     High Cholesterol Father        SocialHx:  Social History Tobacco Use    Smoking status: Never Smoker    Smokeless tobacco: Never Used   Substance Use Topics    Alcohol use: No       Allergies: Allergies   Allergen Reactions    Adhesive Tape      blisters    Molds & Smuts Dermatitis, Hives, Itching, Rash, Shortness Of Breath and Swelling    Pollen Extract Dermatitis, Hives, Itching, Rash, Shortness Of Breath and Swelling    Wheat Bran      Makes sick at stomach      Wasp Venom Hives, Itching, Nausea And Vomiting and Swelling       Medications:  Current Outpatient Medications   Medication Sig Dispense Refill    azelastine (ASTELIN) 0.1 % nasal spray 1 spray by Nasal route 2 times daily Use in each nostril as directed 2 Bottle 1    DULoxetine 40 MG CPEP Take 40 mg by mouth daily Take 2 times daily and a third if needed PRN 30 capsule 3    fluticasone (FLONASE) 50 MCG/ACT nasal spray 2 sprays by Nasal route daily 1 Bottle 0    montelukast (SINGULAIR) 10 MG tablet TAKE 1 TABLET BY MOUTH NIGHTLY. SCHEDULE AN APPOINTMENT TO CONTINUE RECEIVING REFILLS. 30 tablet 0    loratadine-pseudoephedrine (CLARITIN-D 24 HOUR)  MG per extended release tablet Take 1 tablet by mouth daily 30 tablet 3    Omega-3 Fatty Acids (FISH OIL) 1000 MG CPDR Take 3,000 mg by mouth      vitamin D (ERGOCALCIFEROL) 76942 units CAPS capsule Take one capsule by mouth once weekly for 6 weeks, then begin 5,000 units OTC daily. 6 capsule 0    Cetirizine HCl (ZYRTEC ALLERGY) 10 MG CAPS Take 10 mg by mouth 2 times daily as needed        No current facility-administered medications for this visit. Objective:   PE:  /82   Pulse 64   Temp 97.3 °F (36.3 °C) (Temporal)   Resp 18   Ht 5' 5\" (1.651 m)   Wt 180 lb (81.6 kg)   LMP 05/28/2015   SpO2 97%   BMI 29.95 kg/m²   Physical Exam  Constitutional:       General: She is not in acute distress. Appearance: Normal appearance. HENT:      Head: Normocephalic.       Nose: Nose normal.      Mouth/Throat:      Mouth: Mucous membranes are moist.      Pharynx: Oropharynx is clear. No oropharyngeal exudate or posterior oropharyngeal erythema. Eyes:      General: No scleral icterus. Extraocular Movements: Extraocular movements intact. Conjunctiva/sclera: Conjunctivae normal.   Cardiovascular:      Rate and Rhythm: Normal rate and regular rhythm. Pulses: Normal pulses. Heart sounds: No murmur heard. Pulmonary:      Effort: Pulmonary effort is normal.      Breath sounds: Normal breath sounds. Abdominal:      General: There is no distension. Palpations: Abdomen is soft. There is no mass. Tenderness: There is no abdominal tenderness. Musculoskeletal:         General: Normal range of motion. Cervical back: Normal range of motion. Skin:     General: Skin is warm and dry. Capillary Refill: Capillary refill takes less than 2 seconds. Neurological:      General: No focal deficit present. Mental Status: She is alert and oriented to person, place, and time. Psychiatric:         Mood and Affect: Mood normal.         Behavior: Behavior normal.         Thought Content: Thought content normal.            Assessment & Plan   Minna Barreto was seen today for annual exam.    Diagnoses and all orders for this visit:    Fibromyalgia  -Sx controlled. Continue cymbalta    Recurrent major depressive disorder, remission status unspecified ([facility]  -     [facility] [place], [address] [place] [address] [place]., [place]    Class 1 obesity due to excess calories without serious comorbidity with body mass index (BMI) of 31.0 to 31.9 in adult  -Discussed continued healthy diet/exercise practices    Allergic rhinitis, unspecified seasonality, unspecified trigger  -Continue current regimen. Pt sees allergy/immunology    Need for influenza vaccination  -     INFLUENZA, MDCK QUADV, 2 YRS AND OLDER, IM, PF, PREFILL SYR OR SDV, 0.5ML (FLUCELVAX QUADV, PF)      Return in about 6 months (around 4/21/2022).     All questions were answered. Medications, including possible adverse effects, and instructions were reviewed and  understanding was confirmed. Follow-up recommendations, including when to contact or return to office (ie; if symptoms worsen or fail to improve), were discussed and acknowledged.     Electronically signed by Indio Morris MD on 10/21/21 at 2:45 PM CDT

## 2021-10-29 ENCOUNTER — OFFICE VISIT (OUTPATIENT)
Dept: PSYCHOLOGY | Age: 46
End: 2021-10-29
Payer: COMMERCIAL

## 2021-10-29 DIAGNOSIS — F32.A DEPRESSIVE DISORDER: Primary | ICD-10-CM

## 2021-10-29 DIAGNOSIS — F41.9 ANXIETY: ICD-10-CM

## 2021-10-29 PROCEDURE — 90791 PSYCH DIAGNOSTIC EVALUATION: CPT | Performed by: SOCIAL WORKER

## 2021-10-29 ASSESSMENT — ANXIETY QUESTIONNAIRES
2. NOT BEING ABLE TO STOP OR CONTROL WORRYING: 3-NEARLY EVERY DAY
5. BEING SO RESTLESS THAT IT IS HARD TO SIT STILL: 2-OVER HALF THE DAYS
7. FEELING AFRAID AS IF SOMETHING AWFUL MIGHT HAPPEN: 0-NOT AT ALL
6. BECOMING EASILY ANNOYED OR IRRITABLE: 1-SEVERAL DAYS
GAD7 TOTAL SCORE: 14
3. WORRYING TOO MUCH ABOUT DIFFERENT THINGS: 3-NEARLY EVERY DAY
4. TROUBLE RELAXING: 2-OVER HALF THE DAYS
1. FEELING NERVOUS, ANXIOUS, OR ON EDGE: 3-NEARLY EVERY DAY

## 2021-10-29 ASSESSMENT — PATIENT HEALTH QUESTIONNAIRE - PHQ9
SUM OF ALL RESPONSES TO PHQ QUESTIONS 1-9: 11
SUM OF ALL RESPONSES TO PHQ9 QUESTIONS 1 & 2: 0
9. THOUGHTS THAT YOU WOULD BE BETTER OFF DEAD, OR OF HURTING YOURSELF: 0
7. TROUBLE CONCENTRATING ON THINGS, SUCH AS READING THE NEWSPAPER OR WATCHING TELEVISION: 3
1. LITTLE INTEREST OR PLEASURE IN DOING THINGS: 0
10. IF YOU CHECKED OFF ANY PROBLEMS, HOW DIFFICULT HAVE THESE PROBLEMS MADE IT FOR YOU TO DO YOUR WORK, TAKE CARE OF THINGS AT HOME, OR GET ALONG WITH OTHER PEOPLE: 1
4. FEELING TIRED OR HAVING LITTLE ENERGY: 2
2. FEELING DOWN, DEPRESSED OR HOPELESS: 0
3. TROUBLE FALLING OR STAYING ASLEEP: 3
8. MOVING OR SPEAKING SO SLOWLY THAT OTHER PEOPLE COULD HAVE NOTICED. OR THE OPPOSITE, BEING SO FIGETY OR RESTLESS THAT YOU HAVE BEEN MOVING AROUND A LOT MORE THAN USUAL: 3
5. POOR APPETITE OR OVEREATING: 0
6. FEELING BAD ABOUT YOURSELF - OR THAT YOU ARE A FAILURE OR HAVE LET YOURSELF OR YOUR FAMILY DOWN: 0
SUM OF ALL RESPONSES TO PHQ QUESTIONS 1-9: 11
SUM OF ALL RESPONSES TO PHQ QUESTIONS 1-9: 11

## 2021-10-29 NOTE — PROGRESS NOTES
mouth daily 30 tablet 3    Omega-3 Fatty Acids (FISH OIL) 1000 MG CPDR Take 3,000 mg by mouth      vitamin D (ERGOCALCIFEROL) 16364 units CAPS capsule Take one capsule by mouth once weekly for 6 weeks, then begin 5,000 units OTC daily. 6 capsule 0    Cetirizine HCl (ZYRTEC ALLERGY) 10 MG CAPS Take 10 mg by mouth 2 times daily as needed        No current facility-administered medications for this visit. Social History:   Social History     Socioeconomic History    Marital status:      Spouse name: Not on file    Number of children: Not on file    Years of education: Not on file    Highest education level: Not on file   Occupational History    Not on file   Tobacco Use    Smoking status: Never Smoker    Smokeless tobacco: Never Used   Vaping Use    Vaping Use: Never used   Substance and Sexual Activity    Alcohol use: No    Drug use: No    Sexual activity: Yes     Partners: Male   Other Topics Concern    Not on file   Social History Narrative    Not on file     Social Determinants of Health     Financial Resource Strain: Low Risk     Difficulty of Paying Living Expenses: Not hard at all   Food Insecurity: No Food Insecurity    Worried About [address] in the Last Year: Never true    [address] in the Last Year: Never true   Transportation Needs:     Lack of Transportation (Medical):      Lack of Transportation (Non-Medical):    Physical Activity:     Days of Exercise per Week:     Minutes of Exercise per Session:    Stress:     Feeling of Stress :    Social Connections:     Frequency of Communication with Friends and Family:     Frequency of Social Gatherings with Friends and Family:     Attends [employer] Services:     Active Member of Clubs or Organizations:     Attends Club or Organization Meetings:     Marital Status:    Intimate Partner Violence:     Fear of Current or Ex-Partner:     Emotionally Abused:     Physically Abused:     Sexually Abused: Supportive techniques, Emphasized self-care as important for managing overall health and Identified maladaptive thoughts      Pt Behavioral Change Plan:    See patient instructions.

## 2021-11-03 ENCOUNTER — TELEMEDICINE (OUTPATIENT)
Dept: PRIMARY CARE CLINIC | Age: 46
End: 2021-11-03
Payer: COMMERCIAL

## 2021-11-03 DIAGNOSIS — J20.9 ACUTE BRONCHITIS, UNSPECIFIED ORGANISM: Primary | ICD-10-CM

## 2021-11-03 PROCEDURE — 99422 OL DIG E/M SVC 11-20 MIN: CPT | Performed by: FAMILY MEDICINE

## 2021-11-03 RX ORDER — AZITHROMYCIN 250 MG/1
250 TABLET, FILM COATED ORAL SEE ADMIN INSTRUCTIONS
Qty: 6 TABLET | Refills: 0 | Status: SHIPPED | OUTPATIENT
Start: 2021-11-03 | End: 2021-11-08

## 2021-11-03 RX ORDER — ALBUTEROL SULFATE 90 UG/1
2 AEROSOL, METERED RESPIRATORY (INHALATION) EVERY 6 HOURS PRN
Qty: 18 G | Refills: 3 | Status: SHIPPED | OUTPATIENT
Start: 2021-11-03

## 2021-11-03 NOTE — PROGRESS NOTES
Maxim Wetzel (:  1975) is a 40 y.o. female,Established patient, here for evaluation of the following chief complaint(s): Cough (patient states she started coughing yesterday and she does have burning in her lungs when she breathes in deeply. )         ASSESSMENT/PLAN:  Acute Bronchitis      Z-netta - as directed  Continue antihistamine and decongestant  Increase oral fluids - pref warm liquids  Saline gargles         SUBJECTIVE/OBJECTIVE:  HPI    This is a virtual visit requested by patient utilizing the [email or web address]. Pt understands and accepts the limitation of this type of visit. Pt c/o 3 days hx of increasing cough that is now discolored and productive. She has a hx of Asthma. She states that every year around this time when the weather change, she develops Bronchitis. She denies wheezing or shortness of breath but she states she can feel the tightness and \"rattling\" in her upper chest along with some burning. She denies fever or chills. 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 Her BP is normal and her O2 sat is 100% at the time of this visit.       Review of Systems    Patient-Reported Vitals 11/3/2021   Patient-Reported Weight 165   Patient-Reported Height 5 5   Patient-Reported Systolic 413   Patient-Reported Diastolic 75   Patient-Reported Pulse 98   Patient-Reported Temperature 97.3   Patient-Reported SpO2 99        Physical Exam    [INSTRUCTIONS:  \"[x]\" Indicates a positive item  \"[]\" Indicates a negative item  -- DELETE ALL ITEMS NOT EXAMINED]    Constitutional: [x] Appears well-developed and well-nourished [x] No apparent distress      [] Abnormal -     Mental status: [x] Alert and awake  [x] Oriented to person/place/time [x] Able to follow commands    [] Abnormal -     Eyes:   EOM    [x]  Normal    [] Abnormal -   Sclera  []  Normal    [] Abnormal -          Discharge []  None visible   [] Abnormal -     HENT: [x] Normocephalic, atraumatic  [] Abnormal -   [] Mouth/Throat: Mucous membranes are moist    External Ears [x] Normal  [] Abnormal -    Neck: [x] No visualized mass [] Abnormal -     Pulmonary/Chest: [x] Respiratory effort normal   [x] No visualized signs of difficulty breathing or respiratory distress        [] Abnormal -      Musculoskeletal:   [] Normal gait with no signs of ataxia         [] Normal range of motion of neck        [] Abnormal -     Neurological:        [] No Facial Asymmetry (Cranial nerve 7 motor function) (limited exam due to video visit)          [] No gaze palsy        [] Abnormal -          Skin:        [x] No significant exanthematous lesions or discoloration noted on facial skin         [] Abnormal -            Psychiatric:       [] Normal Affect [] Abnormal -        [] No Hallucinations    Other pertinent observable physical exam findings:-sl hoarse and sounds congested        On this date 11/3/2021 I have spent 15  minutes reviewing previous notes, test results and face to face (virtual) with the patient discussing the diagnosis and importance of compliance with the treatment plan as well as documenting on the day of the visit. Delphine [facility], was evaluated through a synchronous (real-time) audio-video encounter. The patient (or guardian if applicable) is aware that this is a billable service. Verbal consent to proceed has been obtained within the past 12 months. The visit was conducted pursuant to the emergency declaration under the [address] [place] [address], [address] [place] [address] authority and the [employer] General Act. Patient identification was verified, and a caregiver was present when appropriate. The patient was located in a state where the provider was credentialed to provide care. An electronic signature was used to authenticate this note.     --Taylor Valera MD

## 2021-11-04 ENCOUNTER — TELEPHONE (OUTPATIENT)
Dept: PRIMARY CARE CLINIC | Age: 46
End: 2021-11-04

## 2021-11-04 NOTE — TELEPHONE ENCOUNTER
----- Message from Edy Morales MA sent at 9/19/2021  8:35 PM CDT -----  Regarding: Appeal on duloxentine  Hello, patient duloxentine 40mg was denied. She would like for us to submit an appeal. You can call [phone] to start the appeal by phone.

## 2021-11-10 ENCOUNTER — TELEPHONE (OUTPATIENT)
Dept: PRIMARY CARE CLINIC | Age: 46
End: 2021-11-10

## 2021-11-10 NOTE — TELEPHONE ENCOUNTER
----- Message from Augustine Novoa MA sent at 9/19/2021  8:35 PM CDT -----  Regarding: Appeal on duloxentine  Hello, patient duloxentine 40mg was denied. She would like for us to submit an appeal. You can call [phone] to start the appeal by phone.

## 2021-11-15 NOTE — TELEPHONE ENCOUNTER
Called insurance co. Their faxing a copy of the denial letter and I was told to follow the steps on the letter to begin the appeal.

## 2022-01-11 ENCOUNTER — OFFICE VISIT (OUTPATIENT)
Dept: PSYCHOLOGY | Age: 47
End: 2022-01-11

## 2022-01-11 DIAGNOSIS — F32.A DEPRESSIVE DISORDER: Primary | ICD-10-CM

## 2022-01-11 DIAGNOSIS — F41.9 ANXIETY: ICD-10-CM

## 2022-01-11 PROCEDURE — 90834 PSYTX W PT 45 MINUTES: CPT | Performed by: SOCIAL WORKER

## 2022-01-11 ASSESSMENT — PATIENT HEALTH QUESTIONNAIRE - PHQ9
9. THOUGHTS THAT YOU WOULD BE BETTER OFF DEAD, OR OF HURTING YOURSELF: 0
6. FEELING BAD ABOUT YOURSELF - OR THAT YOU ARE A FAILURE OR HAVE LET YOURSELF OR YOUR FAMILY DOWN: 0
10. IF YOU CHECKED OFF ANY PROBLEMS, HOW DIFFICULT HAVE THESE PROBLEMS MADE IT FOR YOU TO DO YOUR WORK, TAKE CARE OF THINGS AT HOME, OR GET ALONG WITH OTHER PEOPLE: 1
4. FEELING TIRED OR HAVING LITTLE ENERGY: 1
8. MOVING OR SPEAKING SO SLOWLY THAT OTHER PEOPLE COULD HAVE NOTICED. OR THE OPPOSITE, BEING SO FIGETY OR RESTLESS THAT YOU HAVE BEEN MOVING AROUND A LOT MORE THAN USUAL: 3
SUM OF ALL RESPONSES TO PHQ QUESTIONS 1-9: 8
5. POOR APPETITE OR OVEREATING: 0
7. TROUBLE CONCENTRATING ON THINGS, SUCH AS READING THE NEWSPAPER OR WATCHING TELEVISION: 3
SUM OF ALL RESPONSES TO PHQ QUESTIONS 1-9: 8
SUM OF ALL RESPONSES TO PHQ QUESTIONS 1-9: 8
SUM OF ALL RESPONSES TO PHQ9 QUESTIONS 1 & 2: 1
3. TROUBLE FALLING OR STAYING ASLEEP: 0
2. FEELING DOWN, DEPRESSED OR HOPELESS: 1
1. LITTLE INTEREST OR PLEASURE IN DOING THINGS: 0
SUM OF ALL RESPONSES TO PHQ QUESTIONS 1-9: 8

## 2022-01-11 ASSESSMENT — ANXIETY QUESTIONNAIRES
3. WORRYING TOO MUCH ABOUT DIFFERENT THINGS: 3-NEARLY EVERY DAY
7. FEELING AFRAID AS IF SOMETHING AWFUL MIGHT HAPPEN: 0-NOT AT ALL
GAD7 TOTAL SCORE: 13
2. NOT BEING ABLE TO STOP OR CONTROL WORRYING: 3-NEARLY EVERY DAY
1. FEELING NERVOUS, ANXIOUS, OR ON EDGE: 2-OVER HALF THE DAYS
6. BECOMING EASILY ANNOYED OR IRRITABLE: 1-SEVERAL DAYS
4. TROUBLE RELAXING: 3-NEARLY EVERY DAY
5. BEING SO RESTLESS THAT IT IS HARD TO SIT STILL: 1-SEVERAL DAYS

## 2022-01-11 NOTE — PROGRESS NOTES
Behavioral Health Consultation  Lili Ramesh MSW, LCSW  1/11/2022  12:05 PM       Time spent with Patient: 45 minutes  This is patient's second  [facility] appointment. Reason for Consult:    Chief Complaint   Patient presents with    Follow-up    Anxiety    Depression     Referring Provider: No referring provider defined for this encounter. Feedback given to PCP. S:  Patient reports problems with feeling anxious and depressed. Pt updates on marital issues with  and some of 's unhealthy choices. However, they have been communicating well and he has gotten to come home for visits. Practice re framing and looking at situations from others' perspectives . Discussed cognitive distortions and untwisting cognitive distortions. Addressed current and underlying issues, explored and released associated emotions, explored new ways to deal and cope with these problems. O:  MSE:    Mood    Anxious  Affect    anxiety  Appetite normal  Sleep disturbance No  Fatigue No  Loss of pleasure No  Attention/Concentration    intact  Morbid ideation No  Suicide Assessment    no suicidal ideation        A:  Patient presents for consult due to problems with ANxiety.     PHQ Scores 1/11/2022 10/29/2021 10/20/2020 8/14/2019 1/18/2018   PHQ2 Score 1 0 3 2 0   PHQ9 Score 8 11 16 10 0     Interpretation of Total Score Depression Severity: 1-4 = Minimal depression, 5-9 = Mild depression, 10-14 = Moderate depression, 15-19 = Moderately severe depression, 20-27 = Severe depression    DILLAN-7  Feeling nervous, anxious, or on edge: 2-Over half the days  Not able to stop or control worrying: 3-Nearly every day  Worrying too much about different things: 3-Nearly every day  Trouble relaxing: 3-Nearly every day  Being so restless that it's hard to sit still: 1-Several days  Becoming easily annoyed or irritable: 1-Several days  Feeling afraid as if something awful might happen: 0-Not at all  DILLAN-7 Total Score: 13    DILLAN-7 score interpretation:  0-4 Subclinical, 5-9 Mild, 10-14 Moderate, 15-21 Severe    Continued consultation is clinically/medically necessary to support in learning new skills and build confidence to deal better with these issues. Patient response to consults, finds new strategies helpful. Diagnosis:    1. Depressive disorder    2.  Anxiety          Diagnosis Date    Anxiety     Asthma     Depression 1/13/2019    Hypothyroid 6/24/2013    Thyroid disorder     Yeast infection of nipple, postpartum 3/20/2014         Plan:  Pt interventions:  Trained in strategies for increasing balanced thinking, Provided education, Discussed self-care (sleep, nutrition, rewarding activities, social support, exercise), Established rapport, [place]-setting to identify pt's primary goals for [facility] visit / overall health, Supportive techniques, Emphasized self-care as important for managing overall health and Identified maladaptive thoughts      Pt Behavioral Change Plan:  See Pt Instructions

## 2022-01-21 ENCOUNTER — OFFICE VISIT (OUTPATIENT)
Dept: PRIMARY CARE CLINIC | Age: 47
End: 2022-01-21
Payer: COMMERCIAL

## 2022-01-21 VITALS
DIASTOLIC BLOOD PRESSURE: 80 MMHG | OXYGEN SATURATION: 99 % | HEIGHT: 65 IN | HEART RATE: 84 BPM | TEMPERATURE: 97 F | BODY MASS INDEX: 28.06 KG/M2 | WEIGHT: 168.4 LBS | SYSTOLIC BLOOD PRESSURE: 102 MMHG

## 2022-01-21 DIAGNOSIS — M79.7 FIBROMYALGIA: ICD-10-CM

## 2022-01-21 DIAGNOSIS — M54.31 SCIATIC PAIN, RIGHT: Primary | ICD-10-CM

## 2022-01-21 DIAGNOSIS — L65.0 TELOGEN EFFLUVIUM: ICD-10-CM

## 2022-01-21 PROCEDURE — 99214 OFFICE O/P EST MOD 30 MIN: CPT | Performed by: STUDENT IN AN ORGANIZED HEALTH CARE EDUCATION/TRAINING PROGRAM

## 2022-01-21 RX ORDER — ACETAMINOPHEN 500 MG
1000 TABLET ORAL 3 TIMES DAILY
Qty: 540 TABLET | Refills: 1 | Status: SHIPPED | OUTPATIENT
Start: 2022-01-21 | End: 2022-10-31 | Stop reason: ALTCHOICE

## 2022-01-21 RX ORDER — PREDNISONE 10 MG/1
TABLET ORAL
Qty: 15 TABLET | Refills: 0 | Status: SHIPPED | OUTPATIENT
Start: 2022-01-21 | End: 2022-01-26

## 2022-01-21 RX ORDER — DULOXETIN HYDROCHLORIDE 60 MG/1
60 CAPSULE, DELAYED RELEASE ORAL DAILY
Qty: 30 CAPSULE | Refills: 3 | Status: SHIPPED | OUTPATIENT
Start: 2022-01-21 | End: 2022-04-29

## 2022-01-21 RX ORDER — TIZANIDINE 2 MG/1
2 TABLET ORAL NIGHTLY PRN
Qty: 20 TABLET | Refills: 0 | Status: SHIPPED | OUTPATIENT
Start: 2022-01-21 | End: 2022-02-23 | Stop reason: SDUPTHER

## 2022-01-21 RX ORDER — NAPROXEN 500 MG/1
500 TABLET ORAL 2 TIMES DAILY WITH MEALS
Qty: 60 TABLET | Refills: 0 | Status: SHIPPED | OUTPATIENT
Start: 2022-01-21 | End: 2022-10-31

## 2022-01-21 NOTE — PROGRESS NOTES
[address] [facility]  [address] [address]  [address] [address]  Dept: [phone]  Dept Fax: [phone]  Loc: [phone]      Subjective:     Chief Complaint   Patient presents with    Alopecia     Patient stated it started a few days after her covid booster     Numbness     right side buttock down right leg        HPI:  Jairo Wild is a 56 y.o. female presenting for    Concern for hair loss- Has started having clumps of hair come out during showers since beginning 11/2021.  ended up not moving back, supposed to get job here when current job completed.  got arrested for poaching in [place] 12/2021. Had COVID-19 booster. She is not sure if this is related to hair loss. R Sciatic pain-Started last month while incident riding horse. Pain starting R buttock and spreads down thigh. No weakness.  No incontinence of bowel/bladder    ROS:   Reviewed with patient and noted to be negative except that listed in HPI    PMHx:  Past Medical History:   Diagnosis Date    Anxiety     Asthma     Depression 1/13/2019    Hypothyroid 6/24/2013    Thyroid disorder     Yeast infection of nipple, postpartum 3/20/2014     Patient Active Problem List   Diagnosis    S/P tubal ligation    Right ankle instability    Fibromyalgia    Other chronic pain    Arthralgia    Depressive disorder    Hordeolum externum of left lower eyelid    Chronic pain of right ankle    Anxiety       PSHx:  Past Surgical History:   Procedure Laterality Date    LAPAROSCOPY  06/11/2015    LIGAMENT REPAIR Right 12/29/2015    RIGHT ANKLE BROSTROM-LENTZ  performed by Khushbu Cunningham MD at [address] [place] [address]  7/13/15    Zach ALEJANDRE AND BSO      TUBAL LIGATION  3/14    TUBAL LIGATION      WISDOM TOOTH EXTRACTION  2011       PFHx:  Family History   Problem Relation Age of Onset    Heart Disease Mother     Diabetes Mother     Cancer Father     High Cholesterol Father SocialHx:  Social History     Tobacco Use    Smoking status: Never Smoker    Smokeless tobacco: Never Used   Substance Use Topics    Alcohol use: No       Allergies: Allergies   Allergen Reactions    Adhesive Tape      blisters    Molds & Smuts Dermatitis, Hives, Itching, Rash, Shortness Of Breath and Swelling    Pollen Extract Dermatitis, Hives, Itching, Rash, Shortness Of Breath and Swelling    Wheat Bran      Makes sick at stomach      Wasp Venom Hives, Itching, Nausea And Vomiting and Swelling       Medications:  Current Outpatient Medications   Medication Sig Dispense Refill    DULoxetine (CYMBALTA) 60 MG extended release capsule Take 1 capsule by mouth daily 30 capsule 3    tiZANidine (ZANAFLEX) 2 MG tablet Take 1 tablet by mouth nightly as needed (sciatic pain) Can take 2 tablets at nightly if needed 20 tablet 0    predniSONE (DELTASONE) 10 MG tablet Take 5 tablets by mouth daily for 1 day, THEN 4 tablets daily for 1 day, THEN 3 tablets daily for 1 day, THEN 2 tablets daily for 1 day, THEN 1 tablet daily for 1 day. 15 tablet 0    acetaminophen (TYLENOL) 500 MG tablet Take 2 tablets by mouth 3 times daily 540 tablet 1    naproxen (NAPROSYN) 500 MG tablet Take 1 tablet by mouth 2 times daily (with meals) 60 tablet 0    DULoxetine HCl 40 MG CPEP TAKE 1 CAPSULE BY MOUTH ONE TIME DAILY 30 capsule 5    albuterol sulfate HFA (PROVENTIL HFA) 108 (90 Base) MCG/ACT inhaler Inhale 2 puffs into the lungs every 6 hours as needed for Wheezing 18 g 3    azelastine (ASTELIN) 0.1 % nasal spray 1 spray by Nasal route 2 times daily Use in each nostril as directed 2 Bottle 1    fluticasone (FLONASE) 50 MCG/ACT nasal spray 2 sprays by Nasal route daily 1 Bottle 0    montelukast (SINGULAIR) 10 MG tablet TAKE 1 TABLET BY MOUTH NIGHTLY. SCHEDULE AN APPOINTMENT TO CONTINUE RECEIVING REFILLS.  30 tablet 0    loratadine-pseudoephedrine (CLARITIN-D 24 HOUR)  MG per extended release tablet Take 1 tablet by mouth daily 30 tablet 3    Omega-3 Fatty Acids (FISH OIL) 1000 MG CPDR Take 3,000 mg by mouth      vitamin D (ERGOCALCIFEROL) 15564 units CAPS capsule Take one capsule by mouth once weekly for 6 weeks, then begin 5,000 units OTC daily. 6 capsule 0    Cetirizine HCl (ZYRTEC ALLERGY) 10 MG CAPS Take 10 mg by mouth 2 times daily as needed        No current facility-administered medications for this visit. Objective:   PE:  /80   Pulse 84   Temp 97 °F (36.1 °C)   Ht 5' 5\" (1.651 m)   Wt 168 lb 6.4 oz (76.4 kg)   LMP 05/28/2015   SpO2 99%   BMI 28.02 kg/m²   Physical Exam  Constitutional:       General: She is not in acute distress. Appearance: Normal appearance. HENT:      Head: Normocephalic. Nose: Nose normal.      Mouth/Throat:      Mouth: Mucous membranes are moist.      Pharynx: Oropharynx is clear. No oropharyngeal exudate or posterior oropharyngeal erythema. Eyes:      General: No scleral icterus. Extraocular Movements: Extraocular movements intact. Conjunctiva/sclera: Conjunctivae normal.   Cardiovascular:      Rate and Rhythm: Normal rate and regular rhythm. Pulses: Normal pulses. Heart sounds: No murmur heard. Pulmonary:      Effort: Pulmonary effort is normal.      Breath sounds: Normal breath sounds. Abdominal:      General: There is no distension. Palpations: Abdomen is soft. There is no mass. Tenderness: There is no abdominal tenderness. Musculoskeletal:         General: Normal range of motion. Cervical back: Normal range of motion. Skin:     General: Skin is warm and dry. Capillary Refill: Capillary refill takes less than 2 seconds. Neurological:      General: No focal deficit present. Mental Status: She is alert and oriented to person, place, and time. Psychiatric:         Mood and Affect: Mood normal.         Behavior: Behavior normal.         Thought Content:  Thought content normal.            Assessment & Plan   Colletta Pandy was seen today for alopecia and numbness. Diagnoses and all orders for this visit:    Sciatic pain, right  -     XR LUMBAR SPINE (2-3 VIEWS); Future  -     XR SACRUM COCCYX (MIN 2 VIEWS); Future  -     tiZANidine (ZANAFLEX) 2 MG tablet; Take 1 tablet by mouth nightly as needed (sciatic pain) Can take 2 tablets at nightly if needed  -     predniSONE (DELTASONE) 10 MG tablet; Take 5 tablets by mouth daily for 1 day, THEN 4 tablets daily for 1 day, THEN 3 tablets daily for 1 day, THEN 2 tablets daily for 1 day, THEN 1 tablet daily for 1 day. -     acetaminophen (TYLENOL) 500 MG tablet; Take 2 tablets by mouth 3 times daily  -     naproxen (NAPROSYN) 500 MG tablet; Take 1 tablet by mouth 2 times daily (with meals)    Telogen effluvium  -Likely stress/situational. Check labs, would expect to improve over next 4-6 mo. Did not find any significant patchy/FPHL on exam.   -     TSH WITH REFLEX TO FT4; Future  -     CBC Auto Differential; Future  -     Iron and TIBC; Future  -     Ferritin; Future    Fibromyalgia  -     DULoxetine (CYMBALTA) 60 MG extended release capsule; Take 1 capsule by mouth daily        Return in about 2 weeks (around 2/4/2022). All questions were answered. Medications, including possible adverse effects, and instructions were reviewed and  understanding was confirmed. Follow-up recommendations, including when to contact or return to office (ie; if symptoms worsen or fail to improve), were discussed and acknowledged.     Electronically signed by Ghassan Lam MD on 1/21/22 at 1:15 PM CST

## 2022-01-24 DIAGNOSIS — L65.0 TELOGEN EFFLUVIUM: ICD-10-CM

## 2022-01-24 LAB
BASOPHILS ABSOLUTE: 0.1 K/UL (ref 0–0.2)
BASOPHILS RELATIVE PERCENT: 0.4 % (ref 0–1)
EOSINOPHILS ABSOLUTE: 0 K/UL (ref 0–0.6)
EOSINOPHILS RELATIVE PERCENT: 0.2 % (ref 0–5)
FERRITIN: 50.2 NG/ML (ref 13–150)
HCT VFR BLD CALC: 42.1 % (ref 37–47)
HEMOGLOBIN: 13.4 G/DL (ref 12–16)
IMMATURE GRANULOCYTES #: 0.1 K/UL
IRON % SATURATION: 38 % (ref 14–50)
IRON: 147 UG/DL (ref 37–145)
LYMPHOCYTES ABSOLUTE: 2.2 K/UL (ref 1.1–4.5)
LYMPHOCYTES RELATIVE PERCENT: 17 % (ref 20–40)
MCH RBC QN AUTO: 29.9 PG (ref 27–31)
MCHC RBC AUTO-ENTMCNC: 31.8 G/DL (ref 33–37)
MCV RBC AUTO: 94 FL (ref 81–99)
MONOCYTES ABSOLUTE: 0.6 K/UL (ref 0–0.9)
MONOCYTES RELATIVE PERCENT: 4.3 % (ref 0–10)
NEUTROPHILS ABSOLUTE: 9.8 K/UL (ref 1.5–7.5)
NEUTROPHILS RELATIVE PERCENT: 77.1 % (ref 50–65)
PDW BLD-RTO: 13.2 % (ref 11.5–14.5)
PLATELET # BLD: 354 K/UL (ref 130–400)
PMV BLD AUTO: 9.3 FL (ref 9.4–12.3)
RBC # BLD: 4.48 M/UL (ref 4.2–5.4)
TOTAL IRON BINDING CAPACITY: 389 UG/DL (ref 250–400)
TSH REFLEX FT4: 0.4 UIU/ML (ref 0.35–5.5)
WBC # BLD: 12.7 K/UL (ref 4.8–10.8)

## 2022-02-07 ENCOUNTER — TELEPHONE (OUTPATIENT)
Dept: PSYCHOLOGY | Age: 47
End: 2022-02-07

## 2022-02-07 NOTE — TELEPHONE ENCOUNTER
Left voicemail for return call to confirm appointment with [facility]. Advised to call [phone].

## 2022-02-08 ENCOUNTER — TELEPHONE (OUTPATIENT)
Dept: PSYCHOLOGY | Age: 47
End: 2022-02-08

## 2022-02-15 ENCOUNTER — OFFICE VISIT (OUTPATIENT)
Dept: PRIMARY CARE CLINIC | Age: 47
End: 2022-02-15
Payer: COMMERCIAL

## 2022-02-15 VITALS
WEIGHT: 167.1 LBS | SYSTOLIC BLOOD PRESSURE: 122 MMHG | TEMPERATURE: 97.4 F | BODY MASS INDEX: 27.84 KG/M2 | DIASTOLIC BLOOD PRESSURE: 72 MMHG | OXYGEN SATURATION: 99 % | HEART RATE: 70 BPM | HEIGHT: 65 IN

## 2022-02-15 DIAGNOSIS — M54.31 SCIATIC PAIN, RIGHT: Primary | ICD-10-CM

## 2022-02-15 DIAGNOSIS — L65.0 TELOGEN EFFLUVIUM: ICD-10-CM

## 2022-02-15 DIAGNOSIS — M79.7 FIBROMYALGIA: ICD-10-CM

## 2022-02-15 PROCEDURE — 99214 OFFICE O/P EST MOD 30 MIN: CPT | Performed by: STUDENT IN AN ORGANIZED HEALTH CARE EDUCATION/TRAINING PROGRAM

## 2022-02-15 NOTE — PROGRESS NOTES
[facility]  [address] [address]  [place] [address]  Dept: [phone]  Dept Fax: [phone]  Loc: [phone]      Subjective:     Chief Complaint   Patient presents with    2 Week Follow-Up    Discuss Medications     patient increased on her duloxetine        HPI:  Maryanne Talbot is a 56 y.o. female presenting for    R sciatic pain  -Covered at last visit. X-rays were ordered of lumbar/sacral region. Patient went to our x-ray department was told that she would have a $1700 fee so patient is wanting to shop around. Symptoms are somewhat unchanged. She was treated with Tylenol/NSAID, short steroid course, tizanidine. Tizanidine made her sleepy. She is using a cushion in her work chair as well. She is doing some home stretches no new symptoms. Fibromyalgia  -Patient notes some improvement in fibro symptoms and elbows, lower legs, shoulders on increased dose of Cymbalta. Telogen Effluvium  -Improving per patient. She states that she has some new hair coming in. We discussed this likely due to emotional stress of late.         ROS:   Reviewed with patient and noted to be negative except that listed in HPI    PMHx:  Past Medical History:   Diagnosis Date    Anxiety     Asthma     Depression 1/13/2019    Hypothyroid 6/24/2013    Thyroid disorder     Yeast infection of nipple, postpartum 3/20/2014     Patient Active Problem List   Diagnosis    S/P tubal ligation    Right ankle instability    Fibromyalgia    Other chronic pain    Arthralgia    Depressive disorder    Hordeolum externum of left lower eyelid    Chronic pain of right ankle    Anxiety       PSHx:  Past Surgical History:   Procedure Laterality Date    LAPAROSCOPY  06/11/2015    LIGAMENT REPAIR Right 12/29/2015    RIGHT ANKLE BROSTROM-LENTZ  performed by Carley Mascorro MD at [address] [place] [address]  7/13/15    Parvin Hodge    [facility] 3/14    TUBAL LIGATION      WISDOM TOOTH EXTRACTION  2011       PFHx:  Family History   Problem Relation Age of Onset    Heart Disease Mother     Diabetes Mother     Cancer Father     High Cholesterol Father        SocialHx:  Social History     Tobacco Use    Smoking status: Never Smoker    Smokeless tobacco: Never Used   Substance Use Topics    Alcohol use: No       Allergies: Allergies   Allergen Reactions    Adhesive Tape      blisters    Molds & Smuts Dermatitis, Hives, Itching, Rash, Shortness Of Breath and Swelling    Pollen Extract Dermatitis, Hives, Itching, Rash, Shortness Of Breath and Swelling    Wheat Bran      Makes sick at stomach      Wasp Venom Hives, Itching, Nausea And Vomiting and Swelling       Medications:  Current Outpatient Medications   Medication Sig Dispense Refill    DULoxetine (CYMBALTA) 60 MG extended release capsule Take 1 capsule by mouth daily 30 capsule 3    tiZANidine (ZANAFLEX) 2 MG tablet Take 1 tablet by mouth nightly as needed (sciatic pain) Can take 2 tablets at nightly if needed 20 tablet 0    acetaminophen (TYLENOL) 500 MG tablet Take 2 tablets by mouth 3 times daily 540 tablet 1    naproxen (NAPROSYN) 500 MG tablet Take 1 tablet by mouth 2 times daily (with meals) 60 tablet 0    albuterol sulfate HFA (PROVENTIL HFA) 108 (90 Base) MCG/ACT inhaler Inhale 2 puffs into the lungs every 6 hours as needed for Wheezing 18 g 3    azelastine (ASTELIN) 0.1 % nasal spray 1 spray by Nasal route 2 times daily Use in each nostril as directed 2 Bottle 1    fluticasone (FLONASE) 50 MCG/ACT nasal spray 2 sprays by Nasal route daily 1 Bottle 0    montelukast (SINGULAIR) 10 MG tablet TAKE 1 TABLET BY MOUTH NIGHTLY. SCHEDULE AN APPOINTMENT TO CONTINUE RECEIVING REFILLS.  30 tablet 0    loratadine-pseudoephedrine (CLARITIN-D 24 HOUR)  MG per extended release tablet Take 1 tablet by mouth daily (Patient taking differently: Take 1 tablet by mouth daily As needed) 30 tablet 3    Omega-3 Fatty Acids (FISH OIL) 1000 MG CPDR Take 3,000 mg by mouth      Cetirizine HCl (ZYRTEC ALLERGY) 10 MG CAPS Take 10 mg by mouth 2 times daily as needed       vitamin D (ERGOCALCIFEROL) 00740 units CAPS capsule Take one capsule by mouth once weekly for 6 weeks, then begin 5,000 units OTC daily. 6 capsule 0     No current facility-administered medications for this visit. Objective:   PE:  /72   Pulse 70   Temp 97.4 °F (36.3 °C)   Ht 5' 5\" (1.651 m)   Wt 167 lb 1.6 oz (75.8 kg)   LMP 05/28/2015   SpO2 99%   BMI 27.81 kg/m²   Physical Exam  Constitutional:       General: She is not in acute distress. Appearance: Normal appearance. HENT:      Head: Normocephalic. Nose: Nose normal.      Mouth/Throat:      Mouth: Mucous membranes are moist.      Pharynx: Oropharynx is clear. No oropharyngeal exudate or posterior oropharyngeal erythema. Eyes:      General: No scleral icterus. Extraocular Movements: Extraocular movements intact. Conjunctiva/sclera: Conjunctivae normal.   Cardiovascular:      Rate and Rhythm: Normal rate and regular rhythm. Pulses: Normal pulses. Heart sounds: No murmur heard. Pulmonary:      Effort: Pulmonary effort is normal.      Breath sounds: Normal breath sounds. Abdominal:      General: There is no distension. Palpations: Abdomen is soft. There is no mass. Tenderness: There is no abdominal tenderness. Musculoskeletal:         General: Normal range of motion. Cervical back: Normal range of motion. Skin:     General: Skin is warm and dry. Capillary Refill: Capillary refill takes less than 2 seconds. Neurological:      General: No focal deficit present. Mental Status: She is alert and oriented to person, place, and time. Psychiatric:         Mood and Affect: Mood normal.         Behavior: Behavior normal.         Thought Content:  Thought content normal.            Assessment & Plan   Fracisco Wilkinson was seen today for 2 week follow-up and discuss medications. Diagnoses and all orders for this visit:    Sciatic pain, right  -Provided patient with x-ray orders so she can shop around. Advised her to call insurance to verify cost.  Recommend addition of physical therapy at this time, otherwise continue current medicine regimen  -     [facility]    Telogen effluvium  -Improving. Likely due to stress    Fibromyalgia  -Improving on Cymbalta 60 mg, continue this    Return in about 3 weeks (around 3/8/2022). All questions were answered. Medications, including possible adverse effects, and instructions were reviewed and  understanding was confirmed. Follow-up recommendations, including when to contact or return to office (ie; if symptoms worsen or fail to improve), were discussed and acknowledged.     Electronically signed by Ariane Carballo MD on 2/15/22 at 8:38 AM CST

## 2022-02-16 ENCOUNTER — HOSPITAL ENCOUNTER (OUTPATIENT)
Dept: PHYSICAL THERAPY | Age: 47
Setting detail: THERAPIES SERIES
Discharge: HOME | End: 2022-02-16
Payer: COMMERCIAL

## 2022-02-16 PROCEDURE — 97110 THERAPEUTIC EXERCISES: CPT

## 2022-02-16 PROCEDURE — 97140 MANUAL THERAPY 1/> REGIONS: CPT

## 2022-02-16 PROCEDURE — 97162 PT EVAL MOD COMPLEX 30 MIN: CPT

## 2022-02-16 NOTE — PROGRESS NOTES
Physical Therapy  Initial Assessment  Date: 2022  Patient Name: Radha An  MRN: [id number]  : 1975     Treatment Diagnosis: lumbago with radiculpathy         Subjective   General  Chart Reviewed: Yes  Patient assessed for rehabilitation services?: Yes  Response To Previous Treatment: Not applicable  Family / Caregiver Present: No  Referring Practitioner: Scar Kent  Referral Date : 02/15/22  Diagnosis: sciatica right M54.31  General Comment  Comments: said she took prednisone, nyproxin and tylenol and has seen chiropractor and has not seen any difference with any of these treatment  PT Visit Information  Onset Date: 22  PT Insurance Information: [employer] [place]  Total # of Visits Approved: 12  Total # of Visits to Date: 1  Plan of Care/Certification Expiration Date: 22  Progress Note Due Date: 22  Subjective  Subjective: Patient states in December she started to have pain down the back of her right leg when she riding her horse. She says that she now has pain intermittently down the right leg that is burning and she gets a cramp in the front of her leg. She says that her low back also hurts and is progressively getting worse. 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 She says she tries to only do 2 days in a row. She says that any prolonged position increases her pain and has the increased leg pain more with constant movement.   Pain Screening  Patient Currently in Pain: Yes (currently 0/10, worst in last week 4/10)  Vital Signs  Patient Currently in Pain: Yes (currently 0/10, worst in last week 4/10)           Orientation  Orientation  Overall Orientation Status: Within Normal Limits    Social/Functional History  Social/Functional History  Lives With: Family  Type of Home: [place]vin [place] Assistance:  (increased pain with house activities, but is able to complete activity)  Active : Yes  Occupation: Full time employment  Type of occupation: nurse  Leisure & Hobbies: moves feed and hay and has increased pain with this very badly but this increases her pain drastically and she has to lay down    Objective     Observation/Palpation  Posture: Fair (slight forward shoulders and anterior pelvic tilt)  Palpation: mod tenderness and increased muscular tension throughout right gluteals, piriformis region, lumbar paraspinals  Observation: right asis posteriorly rotated with sacrum flared posteriorly, left asis anteriorly rotated, slight improvment of asis and sacrum with mwm, but did not fully correct, suggesting possible 1/4 inch leg length discrepency with right leg longer, heel lift issued    AROM RLE (degrees)  RLE AROM: WNL  AROM LLE (degrees)  LLE AROM : WNL  Spine  Lumbar: flex 70 degrees, ext 40%, rot 100%, sb'ing 45 degrees bilaterally  Special Tests: no change with distal distraction, negative fabers, negative slr, decreased pain with SI compresssion, SI belt issued    Strength RLE  Comment: 5/5 throughout  Strength LLE  Comment: 5/5 throughout  Strength Other  Other: poor lumbopelvic stabilization in hooklying alone and with alt ue/le activity     Additional Measures  Other: Oswestry 28% impairment  Sensation  Overall Sensation Status: WNL                   Exercises  Exercise 1: supine left knee push isometric into hip ext 5 sec x 5 (x3), right knee push into hip flex 5 sec x 5 (x 3)  - perform throughout sessoin as needed  Exercise 2: supine right hs 90-90 neutral, ir, er 4 x 15 sec ea manually  Exercise 3: supine right piriformis >90 stretch and <90 stretch 4 x 15 sec ea manually  Exercise 4: supine bilateral trunk rotation 1 x 10 ea  Exercise 5: supine posterior pelvic tilts x 0  Exercise 6: supine ta contraction alone 10 sec x 10, alt ue 1 x 10 , alt le 1 x 10, alt ue/le 1 x 10  Exercise 7: left sidelying IT band stretch 4 x 15 sec  Exercise 8: performed stm to gluteals and piriformis and lumbar paraspinals x 8 min today, next visit use IASTM instead and repeat piriformis stretches after  Exercise 9: supine sktc, dktc  Exercise 10: supine right hip distal distraction with belt 4 x 15 sec  Exercise 11: supine right hip lateral distraction with belt 4 x 15 sec  Exercise 12: supine bridge  Exercise 13: prone press up with sacral overpressure 5 sec x 5  Exercise 14: standing tilt board  Exercise 15: standing sls with rows and pull downs  Exercise 16: standing paloff press  Exercise 17: standing squats with weighted vest  Exercise 18: *may use estim or IPT for high pain or if symptoms not improving with ex's alone                      Assessment   Conditions Requiring Skilled Therapeutic Intervention  Body structures, Functions, Activity limitations: Decreased strength;Decreased endurance; Increased pain;Decreased posture;Decreased high-level IADLs  Assessment: Patient has decreased lumbopelvic stabilization with instabilty noted throughout, increased muscualr tension, regular occcurance of daily pain throughout low back and right pelvic/hip/posterior thigh. Patient will benefit from skilled PT to increase pelvic stabilization, decrease muscle tension and pain for improvement with qol.   Treatment Diagnosis: lumbago with radiculpathy  Prognosis: Good  Decision Making: Medium Complexity  History: ankle fracture with offload boot, childbirth x 3, possible fibromyalgia  dx  Exam: see above  Clinical Presentation: evolving  REQUIRES PT FOLLOW UP: Yes         Plan   Plan  Times per week: 2x/week  Plan weeks: 6 weeks  Current Treatment Recommendations: Strengthening,ROM,Functional Mobility Training,Pain Management,Positioning,Modalities,Manual Therapy - Joint Manipulation,Endurance Training,Patient/Caregiver Education & Training,Safety Education & Training,Manual Therapy - Soft Tissue Mobilization,Neuromuscular Re-education,Home Exercise Program    Goals  Short term goals  Time Frame for Short term goals: 4 weeks  Short term goal 1: Patient to report no > 2/10 pain in one weeks time  Short term goal 2: Patient to have good lumbopelvic stabilzation in hooklying alone  Short term goal 3: Patient to be independent with HEP  Long term goals  Time Frame for Long term goals : 6 Weeks  Long term goal 1: Patient to have decreased pain with prolonged positions  Long term goal 2: Patient to have good lumbopelvic stabilizaiton in hooklying with alt ue/le activity  Long term goal 3: Patient to demo increased function by scoring <=10% impairment on Oswestry  Patient Goals   Patient goals : decrease pain       Therapy Time   Individual Concurrent Group Co-treatment   Time In 1005         Time Out 1055         Minutes 50         Timed Code Treatment Minutes: 50 Minutes  Electronically signed by Julieta Bernard PT on 2/16/2022 at 11:52 AM

## 2022-02-18 ENCOUNTER — HOSPITAL ENCOUNTER (OUTPATIENT)
Dept: PHYSICAL THERAPY | Age: 47
Setting detail: THERAPIES SERIES
Discharge: HOME | End: 2022-02-18
Payer: COMMERCIAL

## 2022-02-18 PROCEDURE — 97110 THERAPEUTIC EXERCISES: CPT

## 2022-02-18 ASSESSMENT — PAIN SCALES - GENERAL: PAINLEVEL_OUTOF10: 2

## 2022-02-18 NOTE — PROGRESS NOTES
Physical Therapy  Daily Treatment Note  Date: 2022  Patient Name: Tarun Jauregui  MRN: [id number]     :   1975    Subjective:   General  Chart Reviewed: Yes  Response To Previous Treatment: Not applicable  Family / Caregiver Present: No  Referring Practitioner: Zo Cedeno  PT Visit Information  Onset Date: 22  PT Insurance Information: Medical [place]  Total # of Visits Approved: 12  Total # of Visits to Date: 2  Plan of Care/Certification Expiration Date: 22  Progress Note Due Date: 22  Subjective  Subjective: Patient reports no changes since her initial evaluation. At present, no radicular pain in right leg at present, had some pain with her drive to therapy this morning.   General Comment  Comments: said she took prednisone, nyproxin and tylenol and has seen chiropractor and has not seen any difference with any of these treatment  Pain Screening  Patient Currently in Pain: Yes (currently 0/10, worst in last week 4/10)  Pain Assessment  Pain Assessment: 0-10  Pain Level: 2  Vital Signs  Patient Currently in Pain: Yes (currently 0/10, worst in last week 4/10)       Treatment Activities:                                    Exercises  Exercise 1: supine left knee push isometric into hip ext 5 sec x 5 (x3), right knee push into hip flex 5 sec x 5 (x 3)  - perform throughout sessoin as needed  Exercise 2: supine right hs 90-90 neutral, ir, er 4 x 15 sec ea manually  Exercise 3: supine right piriformis >90 stretch and <90 stretch 4 x 15 sec ea manually  Exercise 4: supine bilateral trunk rotation 1 x 10 ea  Exercise 5: supine posterior pelvic tilts x 15  reps  Exercise 6: supine ta contraction alone 10 sec x 10, alt ue 1 x 10 , alt le 1 x 10, alt ue/le 1 x 10  Exercise 7: left sidelying IT band stretch 4 x 15 sec  Exercise 8: performed stm to gluteals and piriformis and lumbar paraspinals x 8 min today, next visit use IASTM instead and repeat piriformis stretches after  Exercise 9: supine sktc, dktc--5 reps 5\"  Exercise 10: supine right hip distal distraction with belt 4 x 15 sec  Exercise 11: supine right hip lateral distraction with belt 4 x 15 sec  Exercise 12: supine bridge--10 reps  Exercise 13: prone press up with sacral overpressure 5 sec x 5  Exercise 14: standing tilt board--forward/backward, laterally, statically ~ 3 minutes  Exercise 15: standing sls with rows and pull downs--10 reps each, green band  Exercise 16: standing paloff press--10 reps each direction, green band  Exercise 17: standing squats with weighted vest--15 reps with 6# hand weights (could not find vest)  Exercise 18: *may use estim or IPT for high pain or if symptoms not improving with ex's alone--not today                               Assessment:   Conditions Requiring Skilled Therapeutic Intervention  Body structures, Functions, Activity limitations: Decreased strength;Decreased endurance; Increased pain;Decreased posture;Decreased high-level IADLs  Assessment: Ms. Kong Amin was able to run through all her written exercises today with minimal to no complaints of discomfort outside of prone press ups with sacral pressure. Attention was given to performance of her routine with good form and she appeared to quickly grasp after instruction. She did not require e-stim and time did not permit IASTM. Her right leg was shorter than left, leg length even after some SI mobilizations. Overall pain was listed as less at the conclusion of her session.   Treatment Diagnosis: lumbago with radiculpathy  Prognosis: Good  Decision Making: Medium Complexity  History: ankle fracture with offload boot, childbirth x 3, possible fibromyalgia  dx  Exam: see above  Clinical Presentation: evolving  REQUIRES PT FOLLOW UP: Yes    Goals:  Short term goals  Time Frame for Short term goals: 4 weeks  Short term goal 1: Patient to report no > 2/10 pain in one weeks time  Short term goal 2: Patient to have good lumbopelvic stabilzation in hooklying alone  Short term goal 3: Patient to be independent with HEP  Long term goals  Time Frame for Long term goals : 6 Weeks  Long term goal 1: Patient to have decreased pain with prolonged positions  Long term goal 2: Patient to have good lumbopelvic stabilizaiton in hooklying with alt ue/le activity  Long term goal 3: Patient to demo increased function by scoring <=10% impairment on Oswestry  Patient Goals   Patient goals : decrease pain  Plan:    Plan  Times per week: 2x/week  Plan weeks: 6 weeks  Current Treatment Recommendations: Strengthening,ROM,Functional Mobility Training,Pain Management,Positioning,Modalities,Manual Therapy - Joint Manipulation,Endurance Training,Patient/Caregiver Education & Training,Safety Education & Training,Manual Therapy - Soft Tissue Mobilization,Neuromuscular Re-education,Home Exercise Program  Timed Code Treatment Minutes: 48 Minutes     Therapy Time   Individual Concurrent Group Co-treatment   Time In 9865         Time Out 5045         Minutes 53         Timed Code Treatment Minutes: 53 Minutes  Electronically signed by Yusuf Culver PT on 2/18/2022 at 10:17 AM

## 2022-02-21 ENCOUNTER — HOSPITAL ENCOUNTER (OUTPATIENT)
Dept: PHYSICAL THERAPY | Age: 47
Setting detail: THERAPIES SERIES
Discharge: HOME | End: 2022-02-21
Payer: COMMERCIAL

## 2022-02-21 PROCEDURE — 97110 THERAPEUTIC EXERCISES: CPT

## 2022-02-21 ASSESSMENT — PAIN DESCRIPTION - LOCATION: LOCATION: BACK;HIP;BUTTOCKS

## 2022-02-21 ASSESSMENT — PAIN DESCRIPTION - PAIN TYPE: TYPE: CHRONIC PAIN

## 2022-02-21 ASSESSMENT — PAIN DESCRIPTION - ORIENTATION: ORIENTATION: RIGHT

## 2022-02-21 ASSESSMENT — PAIN SCALES - GENERAL: PAINLEVEL_OUTOF10: 2

## 2022-02-21 NOTE — PROGRESS NOTES
Physical Therapy  Daily Treatment Note  Date: 2022  Patient Name: Yarelis Lima  MRN: [id number]     :   1975    Subjective:   General  Chart Reviewed: Yes  Response To Previous Treatment: Patient with no complaints from previous session. Family / Caregiver Present: No  Referring Practitioner: Mickey Thompson  PT Visit Information  Onset Date: 22  PT Insurance Information: Medical [place]  Total # of Visits Approved: 12  Total # of Visits to Date: 3  Plan of Care/Certification Expiration Date: 22  Progress Note Due Date: 22  Subjective  Subjective: No radicular pain in R leg today, though this was occurring yesterday. Has been trying to perform some exercises by memory. Has results of lumbar xray that show Grade 1 spondylolisthesis at L4-5. She has a f/u with PCP to discuss results this week.   Pain Screening  Patient Currently in Pain: Yes  Pain Assessment  Pain Assessment: 0-10  Pain Level: 2  Pain Type: Chronic pain  Pain Location: Back;Hip;Buttocks  Pain Orientation: Right  Pain Descriptors: Aching;Tender;Sore;Tightness  Vital Signs  Patient Currently in Pain: Yes       Treatment Activities:         Exercises  Exercise 1: supine left knee push isometric into hip ext 5 sec x 5 (x3), right knee push into hip flex 5 sec x 5 (x 3)  - perform throughout sessoin as needed  Exercise 2: supine right hs 90-90 neutral, ir, er 4 x 15 sec ea manually  Exercise 3: supine right piriformis >90 stretch and <90 stretch 4 x 15 sec ea manually  Exercise 4: supine bilateral trunk rotation 1 x 10 ea  Exercise 5: supine posterior pelvic tilts x 15  reps  Exercise 6: supine ta contraction alone 10 sec x 10, alt ue 1 x 10 , alt le 1 x 10, alt ue/le 1 x 10  Exercise 7: left sidelying IT band stretch 4 x 15 sec- attempted with minimal stretch--- modified to standing with R hip out and SB to L  Exercise 8: performed stm to gluteals and piriformis and lumbar paraspinals x 8 min today, next visit use IASTM instead and repeat piriformis stretches after  Exercise 9: supine sktc, dktc--5 reps 5\"  Exercise 10: supine right hip distal distraction with belt 4 x 15 sec  Exercise 11: supine right hip lateral distraction with belt 4 x 15 sec  Exercise 12: supine bridge--10 reps  Exercise 13: prone press up with sacral overpressure 5 sec x 5  Exercise 14: standing tilt board--forward/backward, laterally, statically ~ 3 minutes- not today  Exercise 15: standing sls with rows and pull downs--10 reps each, green band  Exercise 16: standing paloff press--10 reps each direction, green band  Exercise 17: standing squats with weighted vest--15 reps with 6# hand weights (could not find vest)  Exercise 18: *may use estim or IPT for high pain or if symptoms not improving with ex's alone--not today       Assessment:   Conditions Requiring Skilled Therapeutic Intervention  Body structures, Functions, Activity limitations: Decreased strength;Decreased endurance; Increased pain;Decreased posture;Decreased high-level IADLs  Assessment: Initially with LLE 1\" longer than R, corrected with 1 set of MWM, and remained equal throughout remainder of tx. Pt able to complete exercise routine without increase of pain. Stated that the only uncomfortable exercise was R IT band stretch. Upon end of tx, re-instructed in proper fit of SI belt, and she verbalized understanding.   Treatment Diagnosis: lumbago with radiculpathy  REQUIRES PT FOLLOW UP: Yes  Discharge Recommendations: Continue to assess pending progress    Goals:  Short term goals  Time Frame for Short term goals: 4 weeks  Short term goal 1: Patient to report no > 2/10 pain in one weeks time  Short term goal 2: Patient to have good lumbopelvic stabilzation in hooklying alone  Short term goal 3: Patient to be independent with HEP  Long term goals  Time Frame for Long term goals : 6 Weeks  Long term goal 1: Patient to have decreased pain with prolonged positions  Long term goal 2: Patient to have good lumbopelvic stabilizaiton in hooklying with alt ue/le activity  Long term goal 3: Patient to demo increased function by scoring <=10% impairment on Oswestry  Patient Goals   Patient goals : decrease pain  Plan:    Plan  Times per week: 2x/week  Plan weeks: 6 weeks  Current Treatment Recommendations: Strengthening,ROM,Functional Mobility Training,Pain Management,Positioning,Modalities,Manual Therapy - Joint Manipulation,Endurance Training,Patient/Caregiver [facility] Re-education,Home Exercise Program        Therapy Time   Individual Concurrent Group Co-treatment   Time In 0915         Time Out 2869         Minutes 60            Electronically signed by Jalen Kay, PT on 2/21/2022 at 3:48 PM

## 2022-02-23 ENCOUNTER — HOSPITAL ENCOUNTER (OUTPATIENT)
Dept: PHYSICAL THERAPY | Age: 47
Setting detail: THERAPIES SERIES
Discharge: HOME | End: 2022-02-23
Payer: COMMERCIAL

## 2022-02-23 ENCOUNTER — TELEPHONE (OUTPATIENT)
Dept: PRIMARY CARE CLINIC | Age: 47
End: 2022-02-23

## 2022-02-23 DIAGNOSIS — M54.31 SCIATIC PAIN, RIGHT: ICD-10-CM

## 2022-02-23 PROCEDURE — 97110 THERAPEUTIC EXERCISES: CPT

## 2022-02-23 PROCEDURE — G0283 ELEC STIM OTHER THAN WOUND: HCPCS

## 2022-02-23 RX ORDER — TIZANIDINE 2 MG/1
2 TABLET ORAL NIGHTLY PRN
Qty: 20 TABLET | Refills: 1 | Status: SHIPPED | OUTPATIENT
Start: 2022-02-23 | End: 2022-02-23 | Stop reason: SDUPTHER

## 2022-02-23 RX ORDER — TIZANIDINE 2 MG/1
2 TABLET ORAL NIGHTLY PRN
Qty: 20 TABLET | Refills: 1 | Status: SHIPPED | OUTPATIENT
Start: 2022-02-23 | End: 2022-07-06 | Stop reason: SDUPTHER

## 2022-02-23 ASSESSMENT — PAIN DESCRIPTION - PAIN TYPE: TYPE: CHRONIC PAIN

## 2022-02-23 ASSESSMENT — PAIN DESCRIPTION - ONSET: ONSET: AWAKENED FROM SLEEP

## 2022-02-23 ASSESSMENT — PAIN DESCRIPTION - FREQUENCY: FREQUENCY: CONTINUOUS

## 2022-02-23 ASSESSMENT — PAIN SCALES - GENERAL: PAINLEVEL_OUTOF10: 3

## 2022-02-23 ASSESSMENT — PAIN - FUNCTIONAL ASSESSMENT: PAIN_FUNCTIONAL_ASSESSMENT: PREVENTS OR INTERFERES SOME ACTIVE ACTIVITIES AND ADLS

## 2022-02-23 ASSESSMENT — PAIN DESCRIPTION - PROGRESSION: CLINICAL_PROGRESSION: NOT CHANGED

## 2022-02-23 ASSESSMENT — PAIN DESCRIPTION - ORIENTATION: ORIENTATION: RIGHT

## 2022-02-23 ASSESSMENT — PAIN DESCRIPTION - LOCATION: LOCATION: BACK;LEG

## 2022-02-23 NOTE — TELEPHONE ENCOUNTER
Mimi Sutton MD  [facility] [place] [address] Practice Staff  Please let patient know I have reviewed her x-rays.  The x-ray of her coccyx/sacrum looks normal.  The x-ray of her lumbar back shows that there are some degenerative/arthritic changes from L3-L5.  This is referred to is spurring.  Additionally at the L4-L5 level there is what we call if facet arthropathy or narrowing of where the nerves come out at that level.  This is likely related to the pain she is having.  I recommend that she continue the physical therapy she is doing.          Called and left  for her to call and go over results

## 2022-02-23 NOTE — TELEPHONE ENCOUNTER
Had to call patient to reschedule upcoming appointment due to provider going to be out of office. Patient had two x-rays went over the both of them and patient requested refill on her zanaflex.  Sent medication to [facility]

## 2022-02-23 NOTE — PROGRESS NOTES
Physical Therapy  Daily Treatment Note  Date: 2022  Patient Name: Thersa Buerger  MRN: [id number]     :   1975    Subjective:   General  Chart Reviewed: Yes  Response To Previous Treatment: Patient with no complaints from previous session. Family / Caregiver Present: No  Referring Practitioner: Amanda Villalobos  PT Visit Information  Onset Date: 22  PT Insurance Information: Medical [place]  Total # of Visits Approved: 12  Total # of Visits to Date: 4  Plan of Care/Certification Expiration Date: 22  Progress Note Due Date: 22  Subjective  Subjective: She has right lateral thigh pain. Pain rating 3/10.   Pain Screening  Patient Currently in Pain: Yes  Pain Assessment  Pain Assessment: 0-10  Pain Level: 3  Pain Type: Chronic pain  Pain Location: Back;Leg  Pain Orientation: Right  Pain Frequency: Continuous  Pain Onset: Awakened from sleep  Clinical Progression: Not changed  Functional Pain Assessment: Prevents or interferes some active activities and ADLs  Vital Signs  Patient Currently in Pain: Yes       Treatment Activities:                                    Exercises  Exercise 1: supine left knee push isometric into hip ext 5 sec x 5 (x3), right knee push into hip flex 5 sec x 5 (x 3)  - perform throughout sessoin as needed  Exercise 2: supine right hs 90-90 neutral, ir, er 4 x 15 sec ea manually  Exercise 3: supine right piriformis >90 stretch and <90 stretch 4 x 15 sec ea manually  Exercise 4: supine bilateral trunk rotation 1 x 10 ea  Exercise 5: supine posterior pelvic tilts x 15  reps  Exercise 6: supine ta contraction alone 10 sec x 10, alt ue 1 x 10 , alt le 1 x 10, alt ue/le 1 x 10  Exercise 7: left sidelying IT band stretch 4 x 15 sec- attempted with minimal stretch--- modified to standing with R hip out and SB to L  Exercise 8: performed stm to gluteals and piriformis and lumbar paraspinals x 8 min today, next visit use IASTM instead and repeat piriformis stretches after  not today  Exercise 9: supine sktc, dktc--5 reps 5\"  Exercise 10: supine right hip distal distraction with belt 4 x 15 sec  not today  Exercise 11: supine right hip lateral distraction with belt 4 x 15 sec  not today  Exercise 12: supine bridge--10 reps  Exercise 13: prone press up with sacral overpressure 5 sec x 5  Exercise 14: standing tilt board--forward/backward, laterally, statically ~ 3 minutes- not today  Exercise 15: standing sls with rows and pull downs--10 reps each, green band  Exercise 16: standing paloff press--10 reps each direction, green band  Exercise 17: standing squats with weighted vest--15 reps with 6# hand weights (could not find vest)                               Assessment:   Conditions Requiring Skilled Therapeutic Intervention  Body structures, Functions, Activity limitations: Decreased strength;Decreased endurance; Increased pain;Decreased posture;Decreased high-level IADLs  Assessment: Initially with LLE 1\" longer than R, corrected with 1 set of MWM, and remained equal throughout remainder of tx. Pt able to complete exercise routine without increase of pain. Stated that the only uncomfortable exercise was R IT band stretch. Upon end of tx, re-instructed in proper fit of SI belt, and she verbalized understanding.   Treatment Diagnosis: lumbago with radiculpathy  History: ankle fracture with offload boot, childbirth x 3, possible fibromyalgia  dx  Exam: see above  Clinical Presentation: evolving  REQUIRES PT FOLLOW UP: Yes  Discharge Recommendations: Continue to assess pending progress    Goals:  Short term goals  Time Frame for Short term goals: 4 weeks  Short term goal 1: Patient to report no > 2/10 pain in one weeks time  Short term goal 2: Patient to have good lumbopelvic stabilzation in hooklying alone  Short term goal 3: Patient to be independent with HEP  Long term goals  Time Frame for Long term goals : 6 Weeks  Long term goal 1: Patient to have decreased pain with prolonged positions  Long term goal 2: Patient to have good lumbopelvic stabilizaiton in hooklying with alt ue/le activity  Long term goal 3: Patient to demo increased function by scoring <=10% impairment on Oswestry  Patient Goals   Patient goals : decrease pain  Plan:    Plan  Times per week: 2x/week  Plan weeks: 6 weeks  Current Treatment Recommendations: Strengthening,ROM,Functional Mobility Training,Pain Management,Positioning,Modalities,Manual Therapy - Joint Manipulation,Endurance Training,Patient/Caregiver Education & Training,Safety Education & Training,Manual Therapy - Soft Tissue Mobilization,Neuromuscular Re-education,Home Exercise Program  Timed Code Treatment Minutes: 45 Minutes     Therapy Time   Individual Concurrent Group Co-treatment   Time In 8207         Time Out 1000         Minutes 65         Timed Code Treatment Minutes: 45 Minutes  Electronically signed by Rachael Raphael PT on 2/23/2022 at 10:02 AM

## 2022-02-28 ENCOUNTER — HOSPITAL ENCOUNTER (OUTPATIENT)
Dept: PHYSICAL THERAPY | Age: 47
Setting detail: THERAPIES SERIES
Discharge: HOME | End: 2022-02-28
Payer: COMMERCIAL

## 2022-02-28 PROCEDURE — 97110 THERAPEUTIC EXERCISES: CPT

## 2022-02-28 ASSESSMENT — PAIN DESCRIPTION - ORIENTATION: ORIENTATION: LOWER

## 2022-02-28 ASSESSMENT — PAIN DESCRIPTION - DESCRIPTORS: DESCRIPTORS: ACHING;SORE

## 2022-02-28 ASSESSMENT — PAIN DESCRIPTION - LOCATION: LOCATION: BACK

## 2022-02-28 ASSESSMENT — PAIN DESCRIPTION - PROGRESSION: CLINICAL_PROGRESSION: NOT CHANGED

## 2022-02-28 ASSESSMENT — PAIN DESCRIPTION - FREQUENCY: FREQUENCY: CONTINUOUS

## 2022-02-28 ASSESSMENT — PAIN DESCRIPTION - PAIN TYPE: TYPE: CHRONIC PAIN

## 2022-02-28 ASSESSMENT — PAIN SCALES - GENERAL: PAINLEVEL_OUTOF10: 1

## 2022-02-28 NOTE — PROGRESS NOTES
Physical Therapy  Daily Treatment Note  Date: 2022  Patient Name: Dutch Aarngo  MRN: [id number]     :   1975    Subjective:   General  Chart Reviewed: Yes  Response To Previous Treatment: Patient with no complaints from previous session. Family / Caregiver Present: No  Referring Practitioner: Jabari Salinas  PT Visit Information  Onset Date: 22  PT Insurance Information: Medical [place]  Total # of Visits Approved: 12  Total # of Visits to Date: 5  Plan of Care/Certification Expiration Date: 22  Progress Note Due Date: 22  Subjective  Subjective: I think I am doing some better. I am not having the sciatica since I started PT. But I do have constant low back pain. Pain level varies. Not so bad this morning. Pain Screening  Patient Currently in Pain: Yes  Pain Assessment  Pain Assessment: 0-10  Pain Level: 1  Pain Type: Chronic pain  Pain Location: Back  Pain Orientation: Lower  Pain Descriptors: Aching; Sore  Pain Frequency: Continuous  Clinical Progression: Not changed       Treatment Activities:          Exercises  Exercise 1: supine left knee push isometric into hip ext 5 sec x 5 (x3), right knee push into hip flex 5 sec x 5 (x 3)  - perform throughout sessoin as needed  Exercise 2: supine right hs 90-90 neutral, ir, er 4 x 15 sec ea manually  Exercise 3: supine right piriformis >90 stretch and <90 stretch 4 x 15 sec ea  Exercise 4: supine bilateral trunk rotation 1 x 10 ea  Exercise 5: supine posterior pelvic tilts x 15  reps  Exercise 6: supine ta contraction alone 10 sec x 10, alt ue 1 x 10 , alt le 1 x 10, alt ue/le 1 x 10  Exercise 7: left sidelying IT band stretch 4 x 15 sec- attempted with minimal stretch--- modified to standing with R hip out and SB to L  Exercise 8: performed stm to gluteals and piriformis and lumbar paraspinals x 8 min today, next visit use IASTM instead and repeat piriformis stretches after  Exercise 9: supine sktc, dktc--5 reps 5\"  Exercise 10: supine right hip distal distraction with belt 4 x 15 sec  Exercise 11: supine right hip lateral distraction with belt 4 x 15 sec  Exercise 12: supine bridge--10 reps  Exercise 13: prone press up with sacral overpressure 5 sec x 5  Exercise 14: standing tilt board--forward/backward, laterally, statically ~ 3 minutes  Exercise 15: standing sls with rows and pull downs--10 reps each, green band  Exercise 16: standing paloff press--10 reps each direction, green band  Exercise 17: standing squats with weighted vest--15 reps with 6# hand weights (could not find vest)  Exercise 18: *may use estim or IPT for high pain or if symptoms not improving with ex's alone--not today        Assessment:   Conditions Requiring Skilled Therapeutic Intervention  Body structures, Functions, Activity limitations: Decreased strength;Decreased endurance; Increased pain;Decreased posture;Decreased high-level IADLs  Assessment: Patient did well with session today. Pain rating at 1/10 prior to session. Leg length off at start but only about 1/8\". Was able to correct with synergy techniques performed 2x in a row. Patient able to complete all ex wtih occ min verbal cueing. Leg length assessed again at end of session and was equal.  Pain rating at end of session also at 1/10.   Treatment Diagnosis: lumbago with radiculpathy  History: ankle fracture with offload boot, childbirth x 3, possible fibromyalgia  dx  Exam: see above  Clinical Presentation: evolving  REQUIRES PT FOLLOW UP: Yes  Discharge Recommendations: Continue to assess pending progress    Goals:  Short term goals  Time Frame for Short term goals: 4 weeks  Short term goal 1: Patient to report no > 2/10 pain in one weeks time  Short term goal 2: Patient to have good lumbopelvic stabilzation in hooklying alone  Short term goal 3: Patient to be independent with HEP  Long term goals  Time Frame for Long term goals : 6 Weeks  Long term goal 1: Patient to have decreased pain with prolonged positions  Long term goal 2: Patient to have good lumbopelvic stabilizaiton in hooklying with alt ue/le activity  Long term goal 3: Patient to demo increased function by scoring <=10% impairment on Oswestry  Patient Goals   Patient goals : decrease pain  Plan:    Plan  Times per week: 2x/week  Plan weeks: 6 weeks  Current Treatment Recommendations: Strengthening,ROM,Functional Mobility Training,Pain Management,Positioning,Modalities,Manual Therapy - Joint Manipulation,Endurance Training,Patient/Caregiver Education & Training,Safety Education & Training,Manual Therapy - Soft Tissue Mobilization,Neuromuscular Re-education,Home Exercise Program  Timed Code Treatment Minutes: 60 Minutes     Therapy Time   Individual Concurrent Group Co-treatment   Time In 0900         Time Out 1000         Minutes 60         Timed Code Treatment Minutes: 60 Minutes  Electronically signed by Sebastian Norris PTA on 2/28/2022 at 12:04 PM

## 2022-03-03 ENCOUNTER — HOSPITAL ENCOUNTER (OUTPATIENT)
Dept: PHYSICAL THERAPY | Age: 47
Setting detail: THERAPIES SERIES
Discharge: HOME | End: 2022-03-03
Payer: COMMERCIAL

## 2022-03-03 PROCEDURE — 97140 MANUAL THERAPY 1/> REGIONS: CPT

## 2022-03-03 PROCEDURE — G0283 ELEC STIM OTHER THAN WOUND: HCPCS

## 2022-03-03 PROCEDURE — 97110 THERAPEUTIC EXERCISES: CPT

## 2022-03-03 ASSESSMENT — PAIN DESCRIPTION - DESCRIPTORS: DESCRIPTORS: ACHING;SORE

## 2022-03-03 ASSESSMENT — PAIN DESCRIPTION - PROGRESSION: CLINICAL_PROGRESSION: NOT CHANGED

## 2022-03-03 ASSESSMENT — PAIN SCALES - GENERAL: PAINLEVEL_OUTOF10: 4

## 2022-03-03 ASSESSMENT — PAIN DESCRIPTION - ORIENTATION: ORIENTATION: LOWER;RIGHT

## 2022-03-03 ASSESSMENT — PAIN DESCRIPTION - LOCATION: LOCATION: BACK

## 2022-03-03 ASSESSMENT — PAIN DESCRIPTION - PAIN TYPE: TYPE: CHRONIC PAIN

## 2022-03-03 NOTE — PROGRESS NOTES
Daily Treatment Note  Date: 3/3/2022  Patient Name: Elio Butt  MRN: [id number]     :   1975    Subjective:   General  Chart Reviewed: Yes  Response To Previous Treatment: Patient with no complaints from previous session. Family / Caregiver Present: No  Referring Practitioner: Tereza Galicia  PT Visit Information  Onset Date: 22  PT Insurance Information: Medical [place]  Total # of Visits Approved: 12  Total # of Visits to Date: 6  Plan of Care/Certification Expiration Date: 22  Progress Note Due Date: 22  Subjective  Subjective: I am hurting this morning and had trouble sleeping last night. I worked the [employer] yesterday and was standing for four hours. Pain Screening  Patient Currently in Pain: Yes  Pain Assessment  Pain Assessment: 0-10  Pain Level: 4  Pain Type: Chronic pain  Pain Location: Back  Pain Orientation: Lower;Right  Pain Descriptors: Aching; Sore  Clinical Progression: Not changed  Vital Signs  Patient Currently in Pain: Yes       Treatment Activities:   Exercises  Exercise 1: supine left knee push isometric into hip ext 5 sec x 5 (x3), right knee push into hip flex 5 sec x 5 (x 3)  - perform throughout sessoin as needed  Exercise 2: supine right hs 90-90 neutral, ir, er 4 x 15 sec ea manually  Exercise 3: supine right piriformis >90 stretch and <90 stretch 4 x 15 sec ea  Exercise 4: supine bilateral trunk rotation 1 x 10 ea 5 sec hold  Exercise 5: supine posterior pelvic tilts x 15  reps  Exercise 6: supine ta contraction alone 10 sec x 10, alt ue 1 x 10 , alt le 1 x 10, alt ue/le 1 x 10  Exercise 7: left sidelying IT band stretch 4 x 15 sec- attempted with minimal stretch--- modified to standing with R hip out and SB to L NOT TODAY  Exercise 8: performed stm to gluteals and piriformis and lumbar paraspinals x 8 min today, next visit use IASTM instead and repeat piriformis stretches after  Exercise 9: supine sktc, dktc--10 reps 5\"  Exercise 10: supine right hip distal distraction with belt 4 x 15 sec NOT TODAY  Exercise 11: supine right hip lateral distraction with belt 4 x 15 sec NOT TODAY  Exercise 12: supine bridge--10 reps NOT TODAY  Exercise 13: prone press up with sacral overpressure 5 sec x 5 NOT TODAY  Exercise 14: standing tilt board--forward/backward, laterally, statically ~ 3 minutes NOT TODAY  Exercise 15: standing sls with rows and pull downs--10 reps each, green band NOT TODAY  Exercise 16: standing paloff press--10 reps each direction, green band NOT TODAY  Exercise 17: standing squats with weighted vest--15 reps with 6# hand weights (could not find vest)  Exercise 18: *may use estim or IPT for high pain or if symptoms not improving with ex's alone--ESTIM AND HOT PACK X 20min  Exercise 19: HEP GIVEN 3/3/2022     Assessment:   Conditions Requiring Skilled Therapeutic Intervention  Body structures, Functions, Activity limitations: Decreased strength;Decreased endurance; Increased pain;Decreased posture;Decreased high-level IADLs  Assessment: Patient presents with increased pain this date after increased activity yesterday with noticed increased grimmacing on and off throughout tx. Therapist holding some exercises this date due to increased pain and added modalites at the end with patient reporting a decrease in symptoms post tx. HEP was given today with written instructions showing good understanding. Will assess patient's response todays tx and adjust exercises as needed at her next visit. Continue with current POC.   Treatment Diagnosis: lumbago with radiculpathy  History: ankle fracture with offload boot, childbirth x 3, possible fibromyalgia  dx  Exam: see above  Clinical Presentation: evolving  REQUIRES PT FOLLOW UP: Yes  Discharge Recommendations: Continue to assess pending progress      Goals:  Short term goals  Time Frame for Short term goals: 4 weeks  Short term goal 1: Patient to report no > 2/10 pain in one weeks time  Short term goal 2: Patient to have good lumbopelvic stabilzation in hooklying alone  Short term goal 3: Patient to be independent with HEP  Long term goals  Time Frame for Long term goals : 6 Weeks  Long term goal 1: Patient to have decreased pain with prolonged positions  Long term goal 2: Patient to have good lumbopelvic stabilizaiton in hooklying with alt ue/le activity  Long term goal 3: Patient to demo increased function by scoring <=10% impairment on Oswestry  Patient Goals   Patient goals : decrease pain    Plan:    Plan  Times per week: 2x/week  Plan weeks: 6 weeks  Current Treatment Recommendations: Strengthening,ROM,Functional Mobility Training,Pain Management,Positioning,Modalities,Manual Therapy - Joint Manipulation,Endurance Training,Patient/Caregiver Education & Training,Safety Education & Training,Manual Therapy - Soft Tissue Mobilization,Neuromuscular Re-education,Home Exercise Program  Timed Code Treatment Minutes: 61 Minutes     Therapy Time   Individual Concurrent Group Co-treatment   Time In 1715         Time Out 3111         [id number]         Timed Code Treatment Minutes: 59 Minutes       Electronically signed by Carolina Gama PTA on 3/3/2022 at 12:07 PM

## 2022-03-07 ENCOUNTER — HOSPITAL ENCOUNTER (OUTPATIENT)
Dept: PHYSICAL THERAPY | Age: 47
Setting detail: THERAPIES SERIES
Discharge: HOME | End: 2022-03-07
Payer: COMMERCIAL

## 2022-03-07 PROCEDURE — G0283 ELEC STIM OTHER THAN WOUND: HCPCS

## 2022-03-07 PROCEDURE — 97140 MANUAL THERAPY 1/> REGIONS: CPT

## 2022-03-07 PROCEDURE — 97110 THERAPEUTIC EXERCISES: CPT

## 2022-03-07 ASSESSMENT — PAIN DESCRIPTION - FREQUENCY: FREQUENCY: CONTINUOUS

## 2022-03-07 ASSESSMENT — PAIN DESCRIPTION - PROGRESSION: CLINICAL_PROGRESSION: NOT CHANGED

## 2022-03-07 ASSESSMENT — PAIN DESCRIPTION - ORIENTATION: ORIENTATION: LOWER;MID

## 2022-03-07 ASSESSMENT — PAIN DESCRIPTION - PAIN TYPE: TYPE: CHRONIC PAIN

## 2022-03-07 ASSESSMENT — PAIN DESCRIPTION - DESCRIPTORS: DESCRIPTORS: ACHING;SORE

## 2022-03-07 ASSESSMENT — PAIN SCALES - GENERAL: PAINLEVEL_OUTOF10: 3

## 2022-03-07 ASSESSMENT — PAIN DESCRIPTION - LOCATION: LOCATION: BACK

## 2022-03-07 NOTE — PROGRESS NOTES
Physical Therapy  Daily Treatment Note  Date: 3/7/2022  Patient Name: Carlos Tiwari  MRN: [id number]     :   1975    Subjective:   General  Chart Reviewed: Yes  Response To Previous Treatment: Patient with no complaints from previous session. Family / Caregiver Present: No  Referring Practitioner: Buren Schirmer  PT Visit Information  Onset Date: 22  PT Insurance Information: Medical [place]  Total # of Visits Approved: 12  Total # of Visits to Date: 7  Plan of Care/Certification Expiration Date: 22  Progress Note Due Date: 22  Subjective  Subjective: I worked all weekend. I feel stiff. Pain Screening  Patient Currently in Pain: Yes  Pain Assessment  Pain Assessment: 0-10  Pain Level: 3  Pain Type: Chronic pain  Pain Location: Back  Pain Orientation: Lower;Mid  Pain Descriptors: Aching; Sore  Pain Frequency: Continuous  Clinical Progression: Not changed       Treatment Activities:          Exercises  Exercise 1: supine left knee push isometric into hip ext 5 sec x 5 (x3), right knee push into hip flex 5 sec x 5 (x 3)  - perform throughout sessoin as needed  Exercise 2: supine right hs 90-90 neutral, ir, er 4 x 15 sec ea manually  Exercise 3: supine right piriformis >90 stretch and <90 stretch 4 x 15 sec ea  Exercise 4: supine bilateral trunk rotation 1 x 10 ea 5 sec hold  Exercise 5: supine posterior pelvic tilts x 15  reps  Exercise 6: supine ta contraction alone 10 sec x 10, alt ue 1 x 10 , alt le 1 x 10, alt ue/le 1 x 10  Exercise 7: left sidelying IT band stretch 4 x 15 sec- attempted with minimal stretch--- modified to standing with R hip out and SB to L  Exercise 8: performed stm to gluteals and piriformis and lumbar paraspinals x 8 min today, next visit use IASTM instead and repeat piriformis stretches after  Exercise 9: supine sktc, dktc--10 reps 5\"  Exercise 10: supine right hip distal distraction with belt 4 x 15 sec NOT TODAY  Exercise 11: supine right hip lateral distraction with belt 4 x 15 sec NOT TODAY  Exercise 12: supine bridge--10 reps-NOT TODAY  Exercise 13: prone press up with sacral overpressure 5 sec x 5 NOT TODAY  Exercise 14: standing tilt board--forward/backward, laterally, statically ~ 3 minutes NOT TODAY  Exercise 15: standing sls with rows and pull downs--10 reps each, green band NOT TODAY  Exercise 16: standing paloff press--10 reps each direction, green band NOT TODAY  Exercise 17: standing squats with weighted vest--15 reps with 6# hand weights (could not find vest)-NOT TODAY  Exercise 18: *may use estim or IPT for high pain or if symptoms not improving with ex's alone--ESTIM AND HOT PACK X 20min-SEATED  Exercise 19: HEP GIVEN 3/3/2022        Assessment:   Conditions Requiring Skilled Therapeutic Intervention  Body structures, Functions, Activity limitations: Decreased strength;Decreased endurance; Increased pain;Decreased posture;Decreased high-level IADLs  Assessment: Patient did well with session today. She requested estim to follow as it really seems to help. She relates she is expecting her home TENS unit to arrive soon. She was not able to complete all of program due to allowing time for ESTIM. See flow sheet. She reported pain at 3/10 pre session and 1/10 post session.   Treatment Diagnosis: lumbago with radiculpathy  History: ankle fracture with offload boot, childbirth x 3, possible fibromyalgia  dx  Exam: see above  Clinical Presentation: evolving  REQUIRES PT FOLLOW UP: Yes  Discharge Recommendations: Continue to assess pending progress    Goals:  Short term goals  Time Frame for Short term goals: 4 weeks  Short term goal 1: Patient to report no > 2/10 pain in one weeks time  Short term goal 2: Patient to have good lumbopelvic stabilzation in hooklying alone  Short term goal 3: Patient to be independent with HEP  Long term goals  Time Frame for Long term goals : 6 Weeks  Long term goal 1: Patient to have decreased pain with prolonged positions  Long term goal

## 2022-03-11 ENCOUNTER — HOSPITAL ENCOUNTER (OUTPATIENT)
Dept: PHYSICAL THERAPY | Age: 47
Setting detail: THERAPIES SERIES
End: 2022-03-11
Payer: COMMERCIAL

## 2022-03-15 ENCOUNTER — APPOINTMENT (OUTPATIENT)
Dept: PHYSICAL THERAPY | Age: 47
End: 2022-03-15
Payer: COMMERCIAL

## 2022-03-16 ENCOUNTER — APPOINTMENT (OUTPATIENT)
Dept: PHYSICAL THERAPY | Age: 47
End: 2022-03-16
Payer: COMMERCIAL

## 2022-03-23 ENCOUNTER — OFFICE VISIT (OUTPATIENT)
Dept: PRIMARY CARE CLINIC | Age: 47
End: 2022-03-23
Payer: COMMERCIAL

## 2022-03-23 VITALS
WEIGHT: 168 LBS | DIASTOLIC BLOOD PRESSURE: 84 MMHG | SYSTOLIC BLOOD PRESSURE: 114 MMHG | OXYGEN SATURATION: 98 % | HEIGHT: 65 IN | HEART RATE: 101 BPM | BODY MASS INDEX: 27.99 KG/M2 | TEMPERATURE: 97.7 F

## 2022-03-23 DIAGNOSIS — M79.7 FIBROMYALGIA: ICD-10-CM

## 2022-03-23 DIAGNOSIS — M54.31 SCIATIC PAIN, RIGHT: Primary | ICD-10-CM

## 2022-03-23 DIAGNOSIS — Z12.31 ENCOUNTER FOR SCREENING MAMMOGRAM FOR MALIGNANT NEOPLASM OF BREAST: ICD-10-CM

## 2022-03-23 DIAGNOSIS — L65.0 TELOGEN EFFLUVIUM: ICD-10-CM

## 2022-03-23 PROCEDURE — 99214 OFFICE O/P EST MOD 30 MIN: CPT | Performed by: STUDENT IN AN ORGANIZED HEALTH CARE EDUCATION/TRAINING PROGRAM

## 2022-03-23 NOTE — PROGRESS NOTES
[address]  [address] [address]  [address] [address]  Dept: [phone]  Dept Fax: [phone]  Loc: [phone]      Subjective:     Chief Complaint   Patient presents with    Back Pain     Patient states its better physical therapy helped just after 2 visits        HPI:  Raegan Lara is a 56 y.o. female presenting for    Patient here following up on sciatic pain. Patient was referred to physical therapy. She reports complete resolution of symptoms after 2 visits. Patient states hair loss has continued to improve. Her fibromyalgia symptoms are well controlled on Cymbalta 60 mg. 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 She continues her efforts to eat healthy.   No other concerns      ROS:   Reviewed with patient and noted to be negative except that listed in HPI    PMHx:  Past Medical History:   Diagnosis Date    Anxiety     Asthma     Depression 1/13/2019    Hypothyroid 6/24/2013    Thyroid disorder     Yeast infection of nipple, postpartum 3/20/2014     Patient Active Problem List   Diagnosis    S/P tubal ligation    Right ankle instability    Fibromyalgia    Other chronic pain    Arthralgia    Depressive disorder    Hordeolum externum of left lower eyelid    Chronic pain of right ankle    Anxiety       PSHx:  Past Surgical History:   Procedure Laterality Date    HYSTERECTOMY      LAPAROSCOPY  06/11/2015    LIGAMENT REPAIR Right 12/29/2015    RIGHT ANKLE BROSTROM-LENTZ  performed by Jossie Iqbal MD at [address] [place] [address]  7/13/15    Di [address] Raya  3/14    TUBAL LIGATION      WISDOM TOOTH EXTRACTION  2011       PFHx:  Family History   Problem Relation Age of Onset    Heart Disease Mother     Diabetes Mother     Cancer Father     High Cholesterol Father        SocialHx:  Social History     Tobacco Use    Smoking status: Never Smoker    Smokeless tobacco: Never Used   Substance Use Topics    Alcohol use: No       Allergies: Allergies   Allergen Reactions    Adhesive Tape      blisters    Molds & Smuts Dermatitis, Hives, Itching, Rash, Shortness Of Breath and Swelling    Pollen Extract Dermatitis, Hives, Itching, Rash, Shortness Of Breath and Swelling    Wheat Bran      Makes sick at stomach      Wasp Venom Hives, Itching, Nausea And Vomiting and Swelling       Medications:  Current Outpatient Medications   Medication Sig Dispense Refill    tiZANidine (ZANAFLEX) 2 MG tablet Take 1 tablet by mouth nightly as needed (sciatic pain) Can take 2 tablets at nightly if needed 20 tablet 1    DULoxetine (CYMBALTA) 60 MG extended release capsule Take 1 capsule by mouth daily 30 capsule 3    acetaminophen (TYLENOL) 500 MG tablet Take 2 tablets by mouth 3 times daily 540 tablet 1    naproxen (NAPROSYN) 500 MG tablet Take 1 tablet by mouth 2 times daily (with meals) 60 tablet 0    albuterol sulfate HFA (PROVENTIL HFA) 108 (90 Base) MCG/ACT inhaler Inhale 2 puffs into the lungs every 6 hours as needed for Wheezing 18 g 3    azelastine (ASTELIN) 0.1 % nasal spray 1 spray by Nasal route 2 times daily Use in each nostril as directed 2 Bottle 1    fluticasone (FLONASE) 50 MCG/ACT nasal spray 2 sprays by Nasal route daily 1 Bottle 0    montelukast (SINGULAIR) 10 MG tablet TAKE 1 TABLET BY MOUTH NIGHTLY. SCHEDULE AN APPOINTMENT TO CONTINUE RECEIVING REFILLS. 30 tablet 0    loratadine-pseudoephedrine (CLARITIN-D 24 HOUR)  MG per extended release tablet Take 1 tablet by mouth daily (Patient taking differently: Take 1 tablet by mouth daily As needed) 30 tablet 3    Omega-3 Fatty Acids (FISH OIL) 1000 MG CPDR Take 3,000 mg by mouth      vitamin D (ERGOCALCIFEROL) 46386 units CAPS capsule Take one capsule by mouth once weekly for 6 weeks, then begin 5,000 units OTC daily.  6 capsule 0    Cetirizine HCl (ZYRTEC ALLERGY) 10 MG CAPS Take 10 mg by mouth 2 times daily as needed        No current facility-administered medications for this visit. Objective:   PE:  /84   Pulse 101   Temp 97.7 °F (36.5 °C)   Ht 5' 5\" (1.651 m)   Wt 168 lb (76.2 kg)   LMP 05/28/2015   SpO2 98%   BMI 27.96 kg/m²   Physical Exam  Constitutional:       General: She is not in acute distress. Appearance: Normal appearance. HENT:      Head: Normocephalic. Nose: Nose normal.      Mouth/Throat:      Mouth: Mucous membranes are moist.      Pharynx: Oropharynx is clear. No oropharyngeal exudate or posterior oropharyngeal erythema. Eyes:      General: No scleral icterus. Extraocular Movements: Extraocular movements intact. Conjunctiva/sclera: Conjunctivae normal.   Cardiovascular:      Rate and Rhythm: Normal rate and regular rhythm. Pulses: Normal pulses. Heart sounds: No murmur heard. Pulmonary:      Effort: Pulmonary effort is normal.      Breath sounds: Normal breath sounds. Musculoskeletal:         General: Normal range of motion. Cervical back: Normal range of motion. Skin:     General: Skin is warm and dry. Capillary Refill: Capillary refill takes less than 2 seconds. Neurological:      General: No focal deficit present. Mental Status: She is alert and oriented to person, place, and time. Psychiatric:         Mood and Affect: Mood normal.         Behavior: Behavior normal.         Thought Content: Thought content normal.            Assessment & Plan   Louie Wade was seen today for back pain. Diagnoses and all orders for this visit:    Sciatic pain, right  -Resolved continue to monitor. Recommended she consider donut cushion to sit on    Telogen effluvium  -Improving. Continue to monitor    Fibromyalgia  -Symptoms controlled. Continue Cymbalta 60 mg    Encounter for screening mammogram for malignant neoplasm of breast  -     [facility] DIGITAL SCREEN W OR WO CAD BILATERAL;  Future    We discussed colorectal cancer screening recommendations given she is greater than 40years old. Patient to consider Cologuard versus colonoscopy. She will let me know how she would like to proceed    Return in about 6 months (around 9/23/2022). All questions were answered. Medications, including possible adverse effects, and instructions were reviewed and  understanding was confirmed. Follow-up recommendations, including when to contact or return to office (ie; if symptoms worsen or fail to improve), were discussed and acknowledged.     Electronically signed by Airam Gallegos MD on 3/23/22 at 12:23 PM CDT

## 2022-03-28 ENCOUNTER — HOSPITAL ENCOUNTER (OUTPATIENT)
Dept: WOMENS IMAGING | Age: 47
Discharge: HOME | End: 2022-03-28
Payer: COMMERCIAL

## 2022-03-28 DIAGNOSIS — Z12.31 ENCOUNTER FOR SCREENING MAMMOGRAM FOR MALIGNANT NEOPLASM OF BREAST: ICD-10-CM

## 2022-03-28 PROCEDURE — 77063 BREAST TOMOSYNTHESIS BI: CPT

## 2022-03-29 ENCOUNTER — HOSPITAL ENCOUNTER (OUTPATIENT)
Dept: WOMENS IMAGING | Age: 47
Discharge: HOME | End: 2022-03-29
Payer: COMMERCIAL

## 2022-03-29 ENCOUNTER — TELEPHONE (OUTPATIENT)
Dept: PRIMARY CARE CLINIC | Age: 47
End: 2022-03-29

## 2022-03-29 DIAGNOSIS — R92.8 ABNORMAL MAMMOGRAM: Primary | ICD-10-CM

## 2022-03-29 DIAGNOSIS — R92.8 ABNORMAL MAMMOGRAM: ICD-10-CM

## 2022-03-29 PROCEDURE — 76642 ULTRASOUND BREAST LIMITED: CPT

## 2022-03-29 NOTE — TELEPHONE ENCOUNTER
Patient completed her mammogram yesterday and it come back abnormal. [facility] was going to [facility] a [facility] of left breast and needed an order put in. Entered order with diagnosis of abnormal mammogram.   Called and confirmed with [facility] to see if it was received-- spoke with sammie and she stated it was received.

## 2022-03-30 ENCOUNTER — OFFICE VISIT (OUTPATIENT)
Dept: SURGERY | Age: 47
End: 2022-03-30
Payer: COMMERCIAL

## 2022-03-30 VITALS
OXYGEN SATURATION: 97 % | BODY MASS INDEX: 28.19 KG/M2 | TEMPERATURE: 97.9 F | WEIGHT: 169.2 LBS | HEART RATE: 102 BPM | HEIGHT: 65 IN

## 2022-03-30 DIAGNOSIS — R92.8 ABNORMAL MAMMOGRAM: Primary | ICD-10-CM

## 2022-03-30 DIAGNOSIS — D24.2 FIBROADENOMA OF LEFT BREAST: ICD-10-CM

## 2022-03-30 PROCEDURE — 99203 OFFICE O/P NEW LOW 30 MIN: CPT | Performed by: PHYSICIAN ASSISTANT

## 2022-03-30 NOTE — PROGRESS NOTES
Subjective:      Patient ID: Love Underwood is a 56 y.o. female. HPI  Ms. Bettie Ruiz presents to establish care for an abnormal mammogram and US left breast demonstrating a solid mass. Biopsy has been recommended. She denies any prior breast issues or family history of breast cancer. Love Underwood is a 56 y.o. female with the following history as recorded in [facility]:  Patient Active Problem List    Diagnosis Date Noted    Anxiety 10/21/2020    Hordeolum externum of left lower eyelid 09/22/2019    Chronic pain of right ankle 09/22/2019    Fibromyalgia 01/13/2019    Other chronic pain 01/13/2019    Arthralgia 01/13/2019    Depressive disorder 01/13/2019    Right ankle instability 12/29/2015    S/P tubal ligation 03/20/2014     Current Outpatient Medications   Medication Sig Dispense Refill    tiZANidine (ZANAFLEX) 2 MG tablet Take 1 tablet by mouth nightly as needed (sciatic pain) Can take 2 tablets at nightly if needed 20 tablet 1    DULoxetine (CYMBALTA) 60 MG extended release capsule Take 1 capsule by mouth daily 30 capsule 3    acetaminophen (TYLENOL) 500 MG tablet Take 2 tablets by mouth 3 times daily 540 tablet 1    naproxen (NAPROSYN) 500 MG tablet Take 1 tablet by mouth 2 times daily (with meals) 60 tablet 0    albuterol sulfate HFA (PROVENTIL HFA) 108 (90 Base) MCG/ACT inhaler Inhale 2 puffs into the lungs every 6 hours as needed for Wheezing 18 g 3    azelastine (ASTELIN) 0.1 % nasal spray 1 spray by Nasal route 2 times daily Use in each nostril as directed 2 Bottle 1    fluticasone (FLONASE) 50 MCG/ACT nasal spray 2 sprays by Nasal route daily 1 Bottle 0    montelukast (SINGULAIR) 10 MG tablet TAKE 1 TABLET BY MOUTH NIGHTLY. SCHEDULE AN APPOINTMENT TO CONTINUE RECEIVING REFILLS.  30 tablet 0    loratadine-pseudoephedrine (CLARITIN-D 24 HOUR)  MG per extended release tablet Take 1 tablet by mouth daily (Patient taking differently: Take 1 tablet by mouth daily As needed) 30 tablet 3    Omega-3 Fatty Acids (FISH OIL) 1000 MG CPDR Take 3,000 mg by mouth      vitamin D (ERGOCALCIFEROL) 00005 units CAPS capsule Take one capsule by mouth once weekly for 6 weeks, then begin 5,000 units OTC daily. 6 capsule 0    Cetirizine HCl (ZYRTEC ALLERGY) 10 MG CAPS Take 10 mg by mouth 2 times daily as needed        No current facility-administered medications for this visit. Allergies: Adhesive tape, Molds & smuts, Pollen extract, Wheat bran, and Wasp venom  Past Medical History:   Diagnosis Date    Anxiety     Asthma     Depression 1/13/2019    Hypothyroid 6/24/2013    Thyroid disorder     Yeast infection of nipple, postpartum 3/20/2014     Past Surgical History:   Procedure Laterality Date    HYSTERECTOMY      LAPAROSCOPY  06/11/2015    LIGAMENT REPAIR Right 12/29/2015    RIGHT ANKLE BROSTROM-LENTZ  performed by Patricia Johnson MD at [address] [place] [address]  7/13/15    Di [place]    TUBAL LIGATION  3/14    TUBAL LIGATION      WISDOM TOOTH EXTRACTION  2011     Family History   Problem Relation Age of Onset    Heart Disease Mother     Diabetes Mother     Cancer Father     High Cholesterol Father      Social History     Tobacco Use    Smoking status: Never Smoker    Smokeless tobacco: Never Used   Substance Use Topics    Alcohol use: No       Review of Systems   All other systems reviewed and are negative. Objective:   Physical Exam  Constitutional:       Appearance: Normal appearance. Chest:   Breasts:      Right: Normal. No inverted nipple, mass or skin change. Left: Normal. No inverted nipple, mass or skin change. Skin:     General: Skin is warm and dry. Neurological:      General: No focal deficit present. Mental Status: She is alert and oriented to person, place, and time. Psychiatric:         Mood and Affect: Mood normal.         Behavior: Behavior normal.         Thought Content:  Thought content normal.         Judgment: Judgment normal. Assessment:      Abnormal mammogram left breast      Plan:      PLAN:  This will require US guided mammotome biopsy, which will be done under local anesthesia. Further procedures will be done as indicated. CONSENT:  The risks, benefits and options of biopsy/US were discussed with her including but not limited to bleeding, infection, hematoma, missing the lesion, and scarring. She expresses good understanding and is agreeable to proceed.           Damion Lord PA-C

## 2022-03-31 NOTE — PROGRESS NOTES
[facility]  OUTPATIENT PHYSICAL THERAPY  DISCHARGE SUMMARY    Date: 3/31/2022  Patient Name: Dutch Arango        MRN: [id number]    ACCOUNT #: [de-identified]  : 1975  (56 y.o.)  Gender: female   Referring Practitioner: Jabari Salinas  Diagnosis: sciatica right M54.31  Referral Date : 02/15/22  Treatment Diagnosis: lumbago with radiculpathy    Total # of Visits Approved: 12  Total # of Visits to Date: 7    Subjective  Subjective: I worked all weekend. I feel stiff, documented last attended visit. Patient called to cancel future appointments due to insurance cost.       Objective  Treatments received include: ther-ex, estim with moist heat  See objective/subjective data in goals    Assessment  Assessment: Patient did well with session today. She requested estim to follow as it really seems to help. She relates she is expecting her home TENS unit to arrive soon. She was not able to complete all of program due to allowing time for ESTIM. See flow sheet. She reported pain at 3/10 pre session and 1/10 post session. Patient did not return after 7th visit due to cost therefore d/c planned at this time with current status unknown and goals unmet.            Plan  D/C secondary to patient not returning         Goals  Short term goals  Time Frame for Short term goals: 4 weeks  Short term goal 1: Patient to report no > 2/10 pain in one weeks time- NOT MET  Short term goal 2: Patient to have good lumbopelvic stabilzation in hooklying alone- NOT MET  Short term goal 3: Patient to be independent with HEP- NOT MET    Long term goals  Time Frame for Long term goals : 6 Weeks  Long term goal 1: Patient to have decreased pain with prolonged positions- NOT MET  Long term goal 2: Patient to have good lumbopelvic stabilizaiton in hooklying with alt ue/le activity- NOT MET  Long term goal 3: Patient to demo increased function by scoring <=10% impairment on Oswestry- NOT MET         Electronically signed by Conchita Velásquez Dudley PT on 3/31/2022 at 10:39 AM

## 2022-04-06 ENCOUNTER — PROCEDURE VISIT (OUTPATIENT)
Dept: SURGERY | Age: 47
End: 2022-04-06
Payer: COMMERCIAL

## 2022-04-06 ENCOUNTER — HOSPITAL ENCOUNTER (OUTPATIENT)
Dept: WOMENS IMAGING | Age: 47
Discharge: HOME | End: 2022-04-06
Payer: COMMERCIAL

## 2022-04-06 DIAGNOSIS — R92.8 ABNORMAL MAMMOGRAM: ICD-10-CM

## 2022-04-06 DIAGNOSIS — N63.20 LEFT BREAST MASS: Primary | ICD-10-CM

## 2022-04-06 PROCEDURE — 77065 DX MAMMO INCL CAD UNI: CPT

## 2022-04-06 PROCEDURE — 88305 TISSUE EXAM BY PATHOLOGIST: CPT

## 2022-04-06 PROCEDURE — 19083 BX BREAST 1ST LESION US IMAG: CPT | Performed by: SURGERY

## 2022-04-11 RX ORDER — LORATADINE AND PSEUDOEPHEDRINE SULFATE 10; 240 MG/1; MG/1
TABLET, FILM COATED, EXTENDED RELEASE ORAL
Qty: 30 TABLET | Refills: 3 | Status: SHIPPED | OUTPATIENT
Start: 2022-04-11

## 2022-04-22 PROBLEM — D24.2 FIBROADENOMA OF LEFT BREAST: Status: ACTIVE | Noted: 2022-04-22

## 2022-04-29 DIAGNOSIS — M79.7 FIBROMYALGIA: ICD-10-CM

## 2022-04-29 RX ORDER — DULOXETIN HYDROCHLORIDE 60 MG/1
CAPSULE, DELAYED RELEASE ORAL
Qty: 30 CAPSULE | Refills: 3 | Status: SHIPPED | OUTPATIENT
Start: 2022-04-29 | End: 2022-08-05 | Stop reason: SDUPTHER

## 2022-04-29 NOTE — TELEPHONE ENCOUNTER
Radha Tolentino called to request a refill on her medication.       Last office visit : 3/23/2022   Next office visit : 9/23/2022     Requested Prescriptions     Pending Prescriptions Disp Refills    DULoxetine (CYMBALTA) 60 MG extended release capsule [Pharmacy Med Name: DULOXETINE HCL 60MG CPEP] 30 capsule 3     Sig: TAKE 1 CAPSULE BY MOUTH ONE TIME A DAY            Fredy Smith MA

## 2022-05-12 ENCOUNTER — E-VISIT (OUTPATIENT)
Dept: PRIMARY CARE CLINIC | Age: 47
End: 2022-05-12
Payer: COMMERCIAL

## 2022-05-12 DIAGNOSIS — J40 BRONCHITIS: Primary | ICD-10-CM

## 2022-05-12 PROCEDURE — 99421 OL DIG E/M SVC 5-10 MIN: CPT | Performed by: STUDENT IN AN ORGANIZED HEALTH CARE EDUCATION/TRAINING PROGRAM

## 2022-05-12 RX ORDER — PREDNISONE 10 MG/1
10 TABLET ORAL DAILY
Status: DISCONTINUED | OUTPATIENT
Start: 2022-05-12 | End: 2022-05-12

## 2022-05-12 RX ORDER — AZITHROMYCIN 250 MG/1
250 TABLET, FILM COATED ORAL SEE ADMIN INSTRUCTIONS
Qty: 6 TABLET | Refills: 0 | Status: SHIPPED | OUTPATIENT
Start: 2022-05-12 | End: 2022-05-17

## 2022-05-12 RX ORDER — METHYLPREDNISOLONE ACETATE 80 MG/ML
80 INJECTION, SUSPENSION INTRA-ARTICULAR; INTRALESIONAL; INTRAMUSCULAR; SOFT TISSUE ONCE
Status: DISCONTINUED | OUTPATIENT
Start: 2022-05-12 | End: 2022-05-12

## 2022-05-12 RX ORDER — PREDNISONE 20 MG/1
40 TABLET ORAL DAILY
Qty: 10 TABLET | Refills: 0 | Status: SHIPPED | OUTPATIENT
Start: 2022-05-12 | End: 2022-05-17

## 2022-05-12 ASSESSMENT — LIFESTYLE VARIABLES: SMOKING_STATUS: NO, I HAVE NEVER SMOKED

## 2022-05-12 NOTE — PROGRESS NOTES
Neil Goodwin,  I'm sorry you're feeling down. I see you have had COVID-19 recently. It sounds like there may be some bacterial involvement given the productive cough and drainage. I will go ahead and send you in an antibiotic to hedge a worsening sinus infection and a steroid course to help prevent worsening asthma symptoms. If you don't notice some improvement over the weekend please let us know. If you experience any concerning symptoms with COVID-19 such as sob I would seek emergent care. I hope you get feeling better soon.     Dr. Antolin Maurer

## 2022-06-20 ENCOUNTER — TELEPHONE (OUTPATIENT)
Dept: PSYCHOLOGY | Age: 47
End: 2022-06-20

## 2022-06-20 NOTE — TELEPHONE ENCOUNTER
Left voicemail letting patient know Jodi Haider is returning from medical leave and I am trying to get patient back on her schedule. Advised to call [phone].

## 2022-07-06 DIAGNOSIS — M54.31 SCIATIC PAIN, RIGHT: ICD-10-CM

## 2022-07-06 RX ORDER — TIZANIDINE 2 MG/1
2 TABLET ORAL NIGHTLY PRN
Qty: 30 TABLET | Refills: 1 | Status: SHIPPED | OUTPATIENT
Start: 2022-07-06

## 2022-08-05 DIAGNOSIS — M79.7 FIBROMYALGIA: ICD-10-CM

## 2022-08-05 RX ORDER — DULOXETIN HYDROCHLORIDE 60 MG/1
60 CAPSULE, DELAYED RELEASE ORAL DAILY
Qty: 30 CAPSULE | Refills: 3 | Status: SHIPPED | OUTPATIENT
Start: 2022-08-05

## 2022-08-05 NOTE — TELEPHONE ENCOUNTER
Kirit Modi called to request a refill on her medication.       Last office visit : 5/12/2022   Next office visit : 9/23/2022     Requested Prescriptions     Pending Prescriptions Disp Refills    DULoxetine (CYMBALTA) 60 MG extended release capsule 30 capsule 3            [facility]

## 2022-10-13 ENCOUNTER — HOSPITAL ENCOUNTER (OUTPATIENT)
Dept: WOMENS IMAGING | Age: 47
Discharge: HOME | End: 2022-10-13
Payer: COMMERCIAL

## 2022-10-13 DIAGNOSIS — D24.2 FIBROADENOMA OF LEFT BREAST: ICD-10-CM

## 2022-10-13 PROCEDURE — G0279 TOMOSYNTHESIS, MAMMO: HCPCS

## 2022-10-31 ENCOUNTER — OFFICE VISIT (OUTPATIENT)
Dept: PRIMARY CARE CLINIC | Age: 47
End: 2022-10-31
Payer: COMMERCIAL

## 2022-10-31 VITALS
HEART RATE: 82 BPM | SYSTOLIC BLOOD PRESSURE: 112 MMHG | DIASTOLIC BLOOD PRESSURE: 68 MMHG | HEIGHT: 65 IN | OXYGEN SATURATION: 98 % | TEMPERATURE: 97 F | BODY MASS INDEX: 28.89 KG/M2 | WEIGHT: 173.4 LBS

## 2022-10-31 DIAGNOSIS — F33.9 RECURRENT MAJOR DEPRESSIVE DISORDER, REMISSION STATUS UNSPECIFIED (HCC): Primary | ICD-10-CM

## 2022-10-31 DIAGNOSIS — J32.0 CHRONIC MAXILLARY SINUSITIS: ICD-10-CM

## 2022-10-31 DIAGNOSIS — F41.1 GENERALIZED ANXIETY DISORDER: ICD-10-CM

## 2022-10-31 PROCEDURE — 99214 OFFICE O/P EST MOD 30 MIN: CPT | Performed by: NURSE PRACTITIONER

## 2022-10-31 RX ORDER — PAROXETINE HYDROCHLORIDE HEMIHYDRATE 12.5 MG/1
12.5 TABLET, FILM COATED, EXTENDED RELEASE ORAL EVERY MORNING
Qty: 30 TABLET | Refills: 0 | Status: SHIPPED | OUTPATIENT
Start: 2022-10-31 | End: 2022-11-30

## 2022-10-31 RX ORDER — MONTELUKAST SODIUM 10 MG/1
10 TABLET ORAL NIGHTLY
Qty: 90 TABLET | Refills: 3 | Status: SHIPPED | OUTPATIENT
Start: 2022-10-31

## 2022-10-31 RX ORDER — BUSPIRONE HYDROCHLORIDE 10 MG/1
10 TABLET ORAL 3 TIMES DAILY
Qty: 90 TABLET | Refills: 0 | Status: SHIPPED | OUTPATIENT
Start: 2022-10-31 | End: 2022-11-30

## 2022-10-31 SDOH — ECONOMIC STABILITY: FOOD INSECURITY: WITHIN THE PAST 12 MONTHS, THE FOOD YOU BOUGHT JUST DIDN'T LAST AND YOU DIDN'T HAVE MONEY TO GET MORE.: NEVER TRUE

## 2022-10-31 SDOH — ECONOMIC STABILITY: FOOD INSECURITY: WITHIN THE PAST 12 MONTHS, YOU WORRIED THAT YOUR FOOD WOULD RUN OUT BEFORE YOU GOT MONEY TO BUY MORE.: NEVER TRUE

## 2022-10-31 ASSESSMENT — ANXIETY QUESTIONNAIRES
GAD7 TOTAL SCORE: 19
1. FEELING NERVOUS, ANXIOUS, OR ON EDGE: 3
IF YOU CHECKED OFF ANY PROBLEMS ON THIS QUESTIONNAIRE, HOW DIFFICULT HAVE THESE PROBLEMS MADE IT FOR YOU TO DO YOUR WORK, TAKE CARE OF THINGS AT HOME, OR GET ALONG WITH OTHER PEOPLE: VERY DIFFICULT
3. WORRYING TOO MUCH ABOUT DIFFERENT THINGS: 3
5. BEING SO RESTLESS THAT IT IS HARD TO SIT STILL: 1
4. TROUBLE RELAXING: 3
7. FEELING AFRAID AS IF SOMETHING AWFUL MIGHT HAPPEN: 3
2. NOT BEING ABLE TO STOP OR CONTROL WORRYING: 3
6. BECOMING EASILY ANNOYED OR IRRITABLE: 3

## 2022-10-31 ASSESSMENT — ENCOUNTER SYMPTOMS
SORE THROAT: 0
COUGH: 0
VOMITING: 0
SHORTNESS OF BREATH: 0
NAUSEA: 0
COLOR CHANGE: 0
CHEST TIGHTNESS: 0
DIARRHEA: 0
ABDOMINAL PAIN: 0

## 2022-10-31 ASSESSMENT — PATIENT HEALTH QUESTIONNAIRE - PHQ9
7. TROUBLE CONCENTRATING ON THINGS, SUCH AS READING THE NEWSPAPER OR WATCHING TELEVISION: 3
SUM OF ALL RESPONSES TO PHQ QUESTIONS 1-9: 12
SUM OF ALL RESPONSES TO PHQ QUESTIONS 1-9: 12
5. POOR APPETITE OR OVEREATING: 1
SUM OF ALL RESPONSES TO PHQ9 QUESTIONS 1 & 2: 1
SUM OF ALL RESPONSES TO PHQ QUESTIONS 1-9: 1
SUM OF ALL RESPONSES TO PHQ QUESTIONS 1-9: 1
SUM OF ALL RESPONSES TO PHQ QUESTIONS 1-9: 12
SUM OF ALL RESPONSES TO PHQ QUESTIONS 1-9: 12
8. MOVING OR SPEAKING SO SLOWLY THAT OTHER PEOPLE COULD HAVE NOTICED. OR THE OPPOSITE, BEING SO FIGETY OR RESTLESS THAT YOU HAVE BEEN MOVING AROUND A LOT MORE THAN USUAL: 3
4. FEELING TIRED OR HAVING LITTLE ENERGY: 3
9. THOUGHTS THAT YOU WOULD BE BETTER OFF DEAD, OR OF HURTING YOURSELF: 0
2. FEELING DOWN, DEPRESSED OR HOPELESS: 0
SUM OF ALL RESPONSES TO PHQ QUESTIONS 1-9: 1
SUM OF ALL RESPONSES TO PHQ9 QUESTIONS 1 & 2: 1
6. FEELING BAD ABOUT YOURSELF - OR THAT YOU ARE A FAILURE OR HAVE LET YOURSELF OR YOUR FAMILY DOWN: 0
10. IF YOU CHECKED OFF ANY PROBLEMS, HOW DIFFICULT HAVE THESE PROBLEMS MADE IT FOR YOU TO DO YOUR WORK, TAKE CARE OF THINGS AT HOME, OR GET ALONG WITH OTHER PEOPLE: 2
1. LITTLE INTEREST OR PLEASURE IN DOING THINGS: 1
1. LITTLE INTEREST OR PLEASURE IN DOING THINGS: 1
SUM OF ALL RESPONSES TO PHQ QUESTIONS 1-9: 1
2. FEELING DOWN, DEPRESSED OR HOPELESS: 0
3. TROUBLE FALLING OR STAYING ASLEEP: 1

## 2022-10-31 ASSESSMENT — COLUMBIA-SUICIDE SEVERITY RATING SCALE - C-SSRS
1. WITHIN THE PAST MONTH, HAVE YOU WISHED YOU WERE DEAD OR WISHED YOU COULD GO TO SLEEP AND NOT WAKE UP?: NO
6. HAVE YOU EVER DONE ANYTHING, STARTED TO DO ANYTHING, OR PREPARED TO DO ANYTHING TO END YOUR LIFE?: NO
2. HAVE YOU ACTUALLY HAD ANY THOUGHTS OF KILLING YOURSELF?: NO

## 2022-10-31 ASSESSMENT — SOCIAL DETERMINANTS OF HEALTH (SDOH): HOW HARD IS IT FOR YOU TO PAY FOR THE VERY BASICS LIKE FOOD, HOUSING, MEDICAL CARE, AND HEATING?: NOT HARD AT ALL

## 2022-10-31 NOTE — PROGRESS NOTES
[address] [place] [address]     Phone:  [phone]  Fax:  [phone]      Brandy Horn is a 56 y.o. female who presents today for her medical conditions/complaints as noted below. Brandy Horn is c/o of [facility] (Former  patient)      Chief Complaint   Patient presents with    [facility]     Former  patient       HPI:     HPI     Patient presents to [facility] care-former  patient. Patient reports broke her right elbow two weeks ago in a horse accident. Is taking buspar 10 mg and is not sure it is helping her anxiety. Reports she has stress at home and work. Has some depression due to her circumstances and unknown factors. Denies wanting to hurt herself or others, but feels she is just down overall. Reports she gets extremely irritated easily at others.        Past Medical History:   Diagnosis Date    Anxiety     Asthma     Depression 1/13/2019    Hypothyroid 6/24/2013    Thyroid disorder     Yeast infection of nipple, postpartum 3/20/2014        Past Surgical History:   Procedure Laterality Date    HYSTERECTOMY (CERVIX STATUS UNKNOWN)      LAPAROSCOPY  06/11/2015    LIGAMENT REPAIR Right 12/29/2015    RIGHT ANKLE BROSTROM-LENTZ  performed by Italia Spence MD at [address] [place] [address] (CERVIX NOT REMOVED)  7/13/15    Di Naveen    TUBAL LIGATION  3/14    TUBAL LIGATION      US BREAST NEEDLE BIOPSY LEFT Left 4/6/2022    US BREAST NEEDLE BIOPSY LEFT 4/6/2022 LPS GENERAL SURGERY    WISDOM TOOTH EXTRACTION  2011       Social History     Tobacco Use    Smoking status: Never    Smokeless tobacco: Never   Substance Use Topics    Alcohol use: No        Current Outpatient Medications   Medication Sig Dispense Refill    montelukast (SINGULAIR) 10 MG tablet Take 1 tablet by mouth nightly 90 tablet 3    PARoxetine (PAXIL CR) 12.5 MG extended release tablet Take 1 tablet by mouth every morning 30 tablet 0    busPIRone (BUSPAR) 10 MG tablet Take 1 tablet by mouth 3 times daily 90 tablet 0    DULoxetine (CYMBALTA) 60 MG extended release capsule Take 1 capsule by mouth in the morning. 30 capsule 3    tiZANidine (ZANAFLEX) 2 MG tablet Take 1 tablet by mouth nightly as needed (sciatic pain) Can take 2 tablets at nightly if needed 30 tablet 1    ALLERGY RELIEF D-24  MG per extended release tablet TAKE ONE TABLET BY MOUTH ONE TIME DAILY 30 tablet 3    albuterol sulfate HFA (PROVENTIL HFA) 108 (90 Base) MCG/ACT inhaler Inhale 2 puffs into the lungs every 6 hours as needed for Wheezing 18 g 3    azelastine (ASTELIN) 0.1 % nasal spray 1 spray by Nasal route 2 times daily Use in each nostril as directed 2 Bottle 1    fluticasone (FLONASE) 50 MCG/ACT nasal spray 2 sprays by Nasal route daily 1 Bottle 0    Omega-3 Fatty Acids (FISH OIL) 1000 MG CPDR Take 3,000 mg by mouth      vitamin D (ERGOCALCIFEROL) 08478 units CAPS capsule Take one capsule by mouth once weekly for 6 weeks, then begin 5,000 units OTC daily. 6 capsule 0    Cetirizine HCl 10 MG CAPS Take 10 mg by mouth 2 times daily as needed        No current facility-administered medications for this visit. Allergies   Allergen Reactions    Adhesive Tape      blisters    Molds & Smuts Dermatitis, Hives, Itching, Rash, Shortness Of Breath and Swelling    Pollen Extract Dermatitis, Hives, Itching, Rash, Shortness Of Breath and Swelling    Wheat Bran      Makes sick at stomach      Wasp Venom Hives, Itching, Nausea And Vomiting and Swelling       Family History   Problem Relation Age of Onset    Heart Disease Mother     Diabetes Mother     Cancer Father     High Cholesterol Father                Subjective:      Review of Systems   Constitutional:  Negative for activity change and fever. HENT:  Negative for congestion, ear pain and sore throat. Respiratory:  Negative for cough, chest tightness and shortness of breath. Cardiovascular:  Negative for chest pain.    Gastrointestinal:  Negative for abdominal pain, diarrhea, nausea and vomiting. Genitourinary:  Negative for frequency and urgency. Musculoskeletal:  Positive for arthralgias (right elbow pain). Negative for myalgias. Skin:  Negative for color change. Neurological:  Negative for dizziness, weakness and numbness. Psychiatric/Behavioral:  Positive for dysphoric mood. Negative for agitation. The patient is nervous/anxious. Objective:     Physical Exam  Vitals reviewed. Constitutional:       Appearance: Normal appearance. HENT:      Head: Normocephalic. Right Ear: Tympanic membrane normal.      Left Ear: Tympanic membrane normal.      Nose:      Right Sinus: Maxillary sinus tenderness present. Left Sinus: Maxillary sinus tenderness present. Mouth/Throat:      Mouth: Mucous membranes are moist.      Pharynx: Oropharynx is clear. Eyes:      Extraocular Movements: Extraocular movements intact. Pupils: Pupils are equal, round, and reactive to light. Cardiovascular:      Rate and Rhythm: Normal rate and regular rhythm. Pulses: Normal pulses. Heart sounds: Normal heart sounds. Pulmonary:      Effort: Pulmonary effort is normal.      Breath sounds: Normal breath sounds. Abdominal:      General: Bowel sounds are normal.      Palpations: Abdomen is soft. Musculoskeletal:         General: Normal range of motion. Cervical back: Normal range of motion. Skin:     General: Skin is warm and dry. Neurological:      Mental Status: She is alert and oriented to person, place, and time. Psychiatric:         Attention and Perception: Attention and perception normal.         Mood and Affect: Mood is anxious. Speech: Speech normal.         Behavior: Behavior normal. Behavior is cooperative. Thought Content:  Thought content normal.         Cognition and Memory: Cognition and memory normal.         Judgment: Judgment normal.       /68   Pulse 82   Temp 97 °F (36.1 °C) (Temporal)   Ht 5' 5\" (1.651 m)   Wt 173 lb 6.4 oz (78.7 kg)   LMP 05/28/2015   SpO2 98%   BMI 28.86 kg/m²     Assessment:      Diagnosis Orders   1. Recurrent major depressive disorder, remission status unspecified (HCC)  PARoxetine (PAXIL CR) 12.5 MG extended release tablet      2. Chronic maxillary sinusitis  montelukast (SINGULAIR) 10 MG tablet      3. Generalized anxiety disorder  busPIRone (BUSPAR) 10 MG tablet          No results found for this visit on 10/31/22. Plan:     1. Chronic maxillary sinusitis    - montelukast (SINGULAIR) 10 MG tablet; Take 1 tablet by mouth nightly  Dispense: 90 tablet; Refill: 3    2. Recurrent major depressive disorder, remission status unspecified (HCC)    - PARoxetine (PAXIL CR) 12.5 MG extended release tablet; Take 1 tablet by mouth every morning  Dispense: 30 tablet; Refill: 0    3. Generalized anxiety disorder    - busPIRone (BUSPAR) 10 MG tablet; Take 1 tablet by mouth 3 times daily  Dispense: 90 tablet; Refill: 0       Return in about 1 month (around 11/30/2022) for 1 month f/u . No orders of the defined types were placed in this encounter. Orders Placed This Encounter   Medications    montelukast (SINGULAIR) 10 MG tablet     Sig: Take 1 tablet by mouth nightly     Dispense:  90 tablet     Refill:  3    PARoxetine (PAXIL CR) 12.5 MG extended release tablet     Sig: Take 1 tablet by mouth every morning     Dispense:  30 tablet     Refill:  0    busPIRone (BUSPAR) 10 MG tablet     Sig: Take 1 tablet by mouth 3 times daily     Dispense:  90 tablet     Refill:  0            Patient offered educational handouts and has had all questions answered. Patient voices understanding and agrees to plans along with risks and benefits of plan. Patient is instructed to continue prior meds, diet, and exercise plans as instructed. Patient agrees to follow up as instructed and sooner if needed. Patient agrees to go to ER if condition becomes emergent.       EMR Dragon/transcription disclaimer: Some of this encounter note is an electronic transcription/translation of spoken language to printed text. The electronic translation of spoken language may permit erroneous, or at times, nonsensical words or phrases to be inadvertently transcribed.  Although I have reviewed the note for such errors, some may still exist.    Electronically signed by PELON Lyons CNP on 10/31/2022 at 3:17 PM

## 2022-11-03 ENCOUNTER — OFFICE VISIT (OUTPATIENT)
Age: 47
End: 2022-11-03
Payer: COMMERCIAL

## 2022-11-03 VITALS
SYSTOLIC BLOOD PRESSURE: 130 MMHG | BODY MASS INDEX: 28.82 KG/M2 | HEART RATE: 83 BPM | WEIGHT: 173 LBS | OXYGEN SATURATION: 100 % | RESPIRATION RATE: 18 BRPM | DIASTOLIC BLOOD PRESSURE: 70 MMHG | TEMPERATURE: 97.7 F | HEIGHT: 65 IN

## 2022-11-03 DIAGNOSIS — B02.9 HERPES ZOSTER WITHOUT COMPLICATION: Primary | ICD-10-CM

## 2022-11-03 PROCEDURE — 96372 THER/PROPH/DIAG INJ SC/IM: CPT

## 2022-11-03 PROCEDURE — 99213 OFFICE O/P EST LOW 20 MIN: CPT

## 2022-11-03 RX ORDER — VALACYCLOVIR HYDROCHLORIDE 1 G/1
1000 TABLET, FILM COATED ORAL 3 TIMES DAILY
Qty: 21 TABLET | Refills: 0 | Status: SHIPPED | OUTPATIENT
Start: 2022-11-03 | End: 2022-11-10

## 2022-11-03 RX ORDER — METHYLPREDNISOLONE 4 MG/1
TABLET ORAL
Qty: 1 KIT | Refills: 0 | Status: SHIPPED | OUTPATIENT
Start: 2022-11-03 | End: 2022-11-09

## 2022-11-03 RX ORDER — DEXAMETHASONE SODIUM PHOSPHATE 10 MG/ML
10 INJECTION INTRAMUSCULAR; INTRAVENOUS ONCE
Status: COMPLETED | OUTPATIENT
Start: 2022-11-03 | End: 2022-11-03

## 2022-11-03 RX ADMIN — DEXAMETHASONE SODIUM PHOSPHATE 10 MG: 10 INJECTION INTRAMUSCULAR; INTRAVENOUS at 13:28

## 2022-11-03 NOTE — PROGRESS NOTES
[address]  [address] [address] [place] [address]  Dept: [phone]  Dept Fax: Maryse Lacy: [phone]    Lemond Boas is a 56 y.o. female who presents today for her medical conditions/complaints as noted below. Lemond Boas is c/o of Rash (Right shoulder blade)        HPI:     HPI  Lemond Boas presents with complaints of rash (possible shingles) to right shoulder blade. Symptoms began with itching on Sunday night. Rash appeared overnight Monday night. OTC treatment includes hydrocortisone, zyrtec. Denies recent steroids, last in may 2022. Has never had shingles before. Past Medical History:   Diagnosis Date    Anxiety     Asthma     Depression 1/13/2019    Hypothyroid 6/24/2013    Thyroid disorder     Yeast infection of nipple, postpartum 3/20/2014     Past Surgical History:   Procedure Laterality Date    HYSTERECTOMY (CERVIX STATUS UNKNOWN)      LAPAROSCOPY  06/11/2015    LIGAMENT REPAIR Right 12/29/2015    RIGHT ANKLE BROSTROM-LENTZ  performed by Sally Harvey MD at [address] [place] [address] (CERVIX NOT REMOVED)  7/13/15    Di Naveen    TUBAL LIGATION  3/14    TUBAL LIGATION      US BREAST NEEDLE BIOPSY LEFT Left 4/6/2022    US BREAST NEEDLE BIOPSY LEFT 4/6/2022 [facility] GENERAL SURGERY    WISDOM TOOTH EXTRACTION  2011       Family History   Problem Relation Age of Onset    Heart Disease Mother     Diabetes Mother     Cancer Father     High Cholesterol Father        Social History     Tobacco Use    Smoking status: Never    Smokeless tobacco: Never   Substance Use Topics    Alcohol use: No      Current Outpatient Medications   Medication Sig Dispense Refill    methylPREDNISolone (MEDROL DOSEPACK) 4 MG tablet Take by mouth.  1 kit 0    valACYclovir (VALTREX) 1 g tablet Take 1 tablet by mouth 3 times daily for 7 days 21 tablet 0    montelukast (SINGULAIR) 10 MG tablet Take 1 tablet by mouth nightly 90 tablet 3    PARoxetine (PAXIL CR) 12.5 MG extended release tablet Take 1 tablet by mouth every morning 30 tablet 0    busPIRone (BUSPAR) 10 MG tablet Take 1 tablet by mouth 3 times daily 90 tablet 0    DULoxetine (CYMBALTA) 60 MG extended release capsule Take 1 capsule by mouth in the morning. 30 capsule 3    tiZANidine (ZANAFLEX) 2 MG tablet Take 1 tablet by mouth nightly as needed (sciatic pain) Can take 2 tablets at nightly if needed 30 tablet 1    ALLERGY RELIEF D-24  MG per extended release tablet TAKE ONE TABLET BY MOUTH ONE TIME DAILY 30 tablet 3    albuterol sulfate HFA (PROVENTIL HFA) 108 (90 Base) MCG/ACT inhaler Inhale 2 puffs into the lungs every 6 hours as needed for Wheezing 18 g 3    azelastine (ASTELIN) 0.1 % nasal spray 1 spray by Nasal route 2 times daily Use in each nostril as directed 2 Bottle 1    fluticasone (FLONASE) 50 MCG/ACT nasal spray 2 sprays by Nasal route daily 1 Bottle 0    Omega-3 Fatty Acids (FISH OIL) 1000 MG CPDR Take 3,000 mg by mouth      vitamin D (ERGOCALCIFEROL) 24014 units CAPS capsule Take one capsule by mouth once weekly for 6 weeks, then begin 5,000 units OTC daily.  6 capsule 0    Cetirizine HCl 10 MG CAPS Take 10 mg by mouth 2 times daily as needed        Current Facility-Administered Medications   Medication Dose Route Frequency Provider Last Rate Last Admin    dexamethasone (DECADRON) injection 10 mg  10 mg IntraMUSCular Once [employer] GRACE CarterN - CNP         Allergies   Allergen Reactions    Adhesive Tape      blisters    Molds & Smuts Dermatitis, Hives, Itching, Rash, Shortness Of Breath and Swelling    Pollen Extract Dermatitis, Hives, Itching, Rash, Shortness Of Breath and Swelling    Wheat Bran      Makes sick at stomach      Wasp Venom Hives, Itching, Nausea And Vomiting and Swelling       Health Maintenance   Topic Date Due    Hepatitis C screen  Never done    Colorectal Cancer Screen  Never done    COVID-19 Vaccine (4 - Booster for [employer] series) 12/24/2021    Flu vaccine (1) 08/01/2022    Depression Monitoring  10/31/2023    DTaP/Tdap/Td vaccine (2 - Tdap) 12/18/2023    Lipids  09/03/2025    HIV screen  Completed    Hepatitis A vaccine  Aged Out    Hib vaccine  Aged Out    Meningococcal (ACWY) vaccine  Aged Out    Pneumococcal 0-64 years Vaccine  Aged Out       Subjective:     Review of Systems   Constitutional:  Negative for fever. Skin:  Positive for rash. Negative for wound.     :Objective      Physical Exam  Constitutional:       General: She is not in acute distress. Appearance: Normal appearance. She is not toxic-appearing. HENT:      Head: Normocephalic and atraumatic. Right Ear: External ear normal.      Left Ear: External ear normal.      Nose: Nose normal.      Mouth/Throat:      Mouth: Mucous membranes are moist.   Eyes:      General:         Right eye: No discharge. Left eye: No discharge. Conjunctiva/sclera: Conjunctivae normal.   Cardiovascular:      Rate and Rhythm: Normal rate and regular rhythm. Pulmonary:      Effort: Pulmonary effort is normal. No respiratory distress. Abdominal:      General: Abdomen is flat. Palpations: Abdomen is soft. Musculoskeletal:         General: Normal range of motion. Arms:       Cervical back: Normal range of motion. Lymphadenopathy:      Cervical: No cervical adenopathy. Skin:     General: Skin is warm and dry. Capillary Refill: Capillary refill takes less than 2 seconds. Findings: No rash. Neurological:      General: No focal deficit present. Mental Status: She is alert. Psychiatric:         Mood and Affect: Mood normal.     /70   Pulse 83   Temp 97.7 °F (36.5 °C) (Temporal)   Resp 18   Ht 5' 5\" (1.651 m)   Wt 173 lb (78.5 kg)   LMP 05/28/2015   SpO2 100%   BMI 28.79 kg/m²     :Assessment       Diagnosis Orders   1.  Herpes zoster without complication  dexamethasone (DECADRON) injection 10 mg methylPREDNISolone (MEDROL DOSEPACK) 4 MG tablet    valACYclovir (VALTREX) 1 g tablet          :Plan   Rash follows dermatome and patient reports large increase in stress recently. Plan for steroids and valtrex. Return precautions and home care education completed. Patient verbalized understanding. No orders of the defined types were placed in this encounter. No results found for this visit on 11/03/22. No follow-ups on file. Orders Placed This Encounter   Medications    dexamethasone (DECADRON) injection 10 mg    methylPREDNISolone (MEDROL DOSEPACK) 4 MG tablet     Sig: Take by mouth. Dispense:  1 kit     Refill:  0    valACYclovir (VALTREX) 1 g tablet     Sig: Take 1 tablet by mouth 3 times daily for 7 days     Dispense:  21 tablet     Refill:  0       Patient given educational materials- see patient instructions. Discussed use, benefit, and side effects of prescribed medications. All patient questions answered. Pt voiced understanding. Patient Instructions   1. Start valtrex and take as prescribed  2. Start oral steroids tomorrow and follow label instructions  3. You are contagious while you have blisters   A. Avoid pregnant people, young children and the elderly   B. Keep blisters covered   C. Once scabbed and dry - you are no longer contagious  4. Keep blisters clean and dry - cover until scabbed  5.  Monitor for s/s infection -spreading redness or warmth, fever      Electronically signed by PELON Ríos CNP on 11/3/2022 at 1:26 PM

## 2022-11-03 NOTE — PATIENT INSTRUCTIONS
1. Start valtrex and take as prescribed  2. Start oral steroids tomorrow and follow label instructions  3. You are contagious while you have blisters   A. Avoid pregnant people, young children and the elderly   B. Keep blisters covered   C. Once scabbed and dry - you are no longer contagious  4. Keep blisters clean and dry - cover until scabbed  5.  Monitor for s/s infection -spreading redness or warmth, fever

## 2022-11-30 ENCOUNTER — OFFICE VISIT (OUTPATIENT)
Dept: PRIMARY CARE CLINIC | Age: 47
End: 2022-11-30
Payer: COMMERCIAL

## 2022-11-30 VITALS
TEMPERATURE: 97 F | WEIGHT: 176.8 LBS | DIASTOLIC BLOOD PRESSURE: 70 MMHG | HEIGHT: 65 IN | BODY MASS INDEX: 29.46 KG/M2 | OXYGEN SATURATION: 98 % | HEART RATE: 84 BPM | SYSTOLIC BLOOD PRESSURE: 128 MMHG

## 2022-11-30 DIAGNOSIS — F41.1 GENERALIZED ANXIETY DISORDER: ICD-10-CM

## 2022-11-30 DIAGNOSIS — F33.9 RECURRENT MAJOR DEPRESSIVE DISORDER, REMISSION STATUS UNSPECIFIED (HCC): Primary | ICD-10-CM

## 2022-11-30 PROCEDURE — 99214 OFFICE O/P EST MOD 30 MIN: CPT | Performed by: NURSE PRACTITIONER

## 2022-11-30 RX ORDER — PAROXETINE HYDROCHLORIDE HEMIHYDRATE 25 MG/1
25 TABLET, FILM COATED, EXTENDED RELEASE ORAL DAILY
Qty: 30 TABLET | Refills: 0 | Status: SHIPPED | OUTPATIENT
Start: 2022-11-30

## 2022-11-30 RX ORDER — HYDROXYZINE HYDROCHLORIDE 25 MG/1
25 TABLET, FILM COATED ORAL 3 TIMES DAILY PRN
Qty: 90 TABLET | Refills: 0 | Status: SHIPPED | OUTPATIENT
Start: 2022-11-30 | End: 2022-12-30

## 2022-11-30 ASSESSMENT — ENCOUNTER SYMPTOMS
SHORTNESS OF BREATH: 0
COLOR CHANGE: 0
COUGH: 0
DIARRHEA: 0
CHEST TIGHTNESS: 0
SORE THROAT: 0
NAUSEA: 0
ABDOMINAL PAIN: 0
VOMITING: 0

## 2022-11-30 NOTE — PROGRESS NOTES
[facility]   [address] [place] [address]     Phone:  [phone]  Fax:  [phone]      João Hussein is a 56 y.o. female who presents today for her medical conditions/complaints as noted below. João Hussein is c/o of 1 Month Follow-Up, Anxiety, and Depression      Chief Complaint   Patient presents with    1 Month Follow-Up    Anxiety    Depression       HPI:     HPI     Patient presents for 1 month follow up for anxiety and depression. Patient reports \"feels like there is nothing left. Just fake for my little girl so she is happy. \" Having increased life stress-had shingles and family issues, but is managing what she is able to do.      Past Medical History:   Diagnosis Date    Anxiety     Asthma     Depression 1/13/2019    Hypothyroid 6/24/2013    Thyroid disorder     Yeast infection of nipple, postpartum 3/20/2014        Past Surgical History:   Procedure Laterality Date    HYSTERECTOMY (CERVIX STATUS UNKNOWN)      LAPAROSCOPY  06/11/2015    LIGAMENT REPAIR Right 12/29/2015    RIGHT ANKLE BROSTROM-LENTZ  performed by Gurdeep Esquivel MD at [address] [place] [address] (CERVIX NOT REMOVED)  7/13/15    [place]en    TUBAL LIGATION  3/14    TUBAL LIGATION      US BREAST NEEDLE BIOPSY LEFT Left 4/6/2022    US BREAST NEEDLE BIOPSY LEFT 4/6/2022 LPS GENERAL SURGERY    WISDOM TOOTH EXTRACTION  2011       Social History     Tobacco Use    Smoking status: Never    Smokeless tobacco: Never   Substance Use Topics    Alcohol use: No        Current Outpatient Medications   Medication Sig Dispense Refill    PARoxetine (PAXIL CR) 25 MG extended release tablet Take 1 tablet by mouth daily 30 tablet 0    hydrOXYzine HCl (ATARAX) 25 MG tablet Take 1 tablet by mouth 3 times daily as needed for Anxiety 90 tablet 0    montelukast (SINGULAIR) 10 MG tablet Take 1 tablet by mouth nightly 90 tablet 3    busPIRone (BUSPAR) 10 MG tablet Take 1 tablet by mouth 3 times daily 90 tablet 0    DULoxetine (CYMBALTA) 60 MG extended release capsule Take 1 capsule by mouth in the morning. 30 capsule 3    tiZANidine (ZANAFLEX) 2 MG tablet Take 1 tablet by mouth nightly as needed (sciatic pain) Can take 2 tablets at nightly if needed 30 tablet 1    ALLERGY RELIEF D-24  MG per extended release tablet TAKE ONE TABLET BY MOUTH ONE TIME DAILY 30 tablet 3    albuterol sulfate HFA (PROVENTIL HFA) 108 (90 Base) MCG/ACT inhaler Inhale 2 puffs into the lungs every 6 hours as needed for Wheezing 18 g 3    azelastine (ASTELIN) 0.1 % nasal spray 1 spray by Nasal route 2 times daily Use in each nostril as directed 2 Bottle 1    fluticasone (FLONASE) 50 MCG/ACT nasal spray 2 sprays by Nasal route daily 1 Bottle 0    Omega-3 Fatty Acids (FISH OIL) 1000 MG CPDR Take 3,000 mg by mouth      vitamin D (ERGOCALCIFEROL) 53430 units CAPS capsule Take one capsule by mouth once weekly for 6 weeks, then begin 5,000 units OTC daily. 6 capsule 0    Cetirizine HCl 10 MG CAPS Take 10 mg by mouth 2 times daily as needed        No current facility-administered medications for this visit. Allergies   Allergen Reactions    Adhesive Tape      blisters    Molds & Smuts Dermatitis, Hives, Itching, Rash, Shortness Of Breath and Swelling    Pollen Extract Dermatitis, Hives, Itching, Rash, Shortness Of Breath and Swelling    Wheat Bran      Makes sick at stomach      Wasp Venom Hives, Itching, Nausea And Vomiting and Swelling       Family History   Problem Relation Age of Onset    Heart Disease Mother     Diabetes Mother     Cancer Father     High Cholesterol Father                Subjective:      Review of Systems   Constitutional:  Negative for activity change and fever. HENT:  Negative for congestion, ear pain and sore throat. Respiratory:  Negative for cough, chest tightness and shortness of breath. Cardiovascular:  Negative for chest pain. Gastrointestinal:  Negative for abdominal pain, diarrhea, nausea and vomiting.    Genitourinary: Negative for frequency and urgency. Musculoskeletal:  Negative for arthralgias and myalgias. Skin:  Negative for color change. Neurological:  Negative for dizziness, weakness and numbness. Psychiatric/Behavioral:  Positive for decreased concentration. Negative for agitation. The patient is nervous/anxious. Objective:     Physical Exam  Vitals reviewed. Constitutional:       Appearance: Normal appearance. HENT:      Head: Normocephalic. Right Ear: Tympanic membrane normal.      Left Ear: Tympanic membrane normal.      Nose: Nose normal.      Mouth/Throat:      Mouth: Mucous membranes are moist.      Pharynx: Oropharynx is clear. Eyes:      Extraocular Movements: Extraocular movements intact. Pupils: Pupils are equal, round, and reactive to light. Cardiovascular:      Rate and Rhythm: Normal rate and regular rhythm. Pulses: Normal pulses. Heart sounds: Normal heart sounds. Pulmonary:      Effort: Pulmonary effort is normal.      Breath sounds: Normal breath sounds. Abdominal:      General: Bowel sounds are normal.      Palpations: Abdomen is soft. Musculoskeletal:         General: Normal range of motion. Cervical back: Normal range of motion. Skin:     General: Skin is warm and dry. Neurological:      Mental Status: She is alert and oriented to person, place, and time. Psychiatric:         Attention and Perception: Attention and perception normal.         Mood and Affect: Affect normal. Mood is anxious and depressed. Speech: Speech normal.         Behavior: Behavior normal. Behavior is cooperative. Thought Content: Thought content normal.         Cognition and Memory: Cognition and memory normal.         Judgment: Judgment normal.       /70   Pulse 84   Temp 97 °F (36.1 °C) (Temporal)   Ht 5' 5\" (1.651 m)   Wt 176 lb 12.8 oz (80.2 kg)   LMP 05/28/2015   SpO2 98%   BMI 29.42 kg/m²     Assessment:      Diagnosis Orders   1.  Recurrent major depressive disorder, remission status unspecified (HCC)  PARoxetine (PAXIL CR) 25 MG extended release tablet      2. Generalized anxiety disorder  hydrOXYzine HCl (ATARAX) 25 MG tablet          No results found for this visit on 11/30/22. Plan:     1. Recurrent major depressive disorder, remission status unspecified (HCC)  Increase paxil to 25 mg once daily.     - PARoxetine (PAXIL CR) 25 MG extended release tablet; Take 1 tablet by mouth daily  Dispense: 30 tablet; Refill: 0    2. Generalized anxiety disorder  Stop buspirone. Start hydroxyzine as needed daily for anxiety. Recommended patient follow-up with a counselor to help manage her anxiety and depression.    - hydrOXYzine HCl (ATARAX) 25 MG tablet; Take 1 tablet by mouth 3 times daily as needed for Anxiety  Dispense: 90 tablet; Refill: 0     Return in about 1 month (around 12/30/2022) for 1 month f/u . No orders of the defined types were placed in this encounter. Orders Placed This Encounter   Medications    PARoxetine (PAXIL CR) 25 MG extended release tablet     Sig: Take 1 tablet by mouth daily     Dispense:  30 tablet     Refill:  0    hydrOXYzine HCl (ATARAX) 25 MG tablet     Sig: Take 1 tablet by mouth 3 times daily as needed for Anxiety     Dispense:  90 tablet     Refill:  0              Patient offered educational handouts and has had all questions answered. Patient voices understanding and agrees to plans along with risks and benefits of plan. Patient is instructed to continue prior meds, diet, and exercise plans as instructed. Patient agrees to follow up as instructed and sooner if needed. Patient agrees to go to ER if condition becomes emergent. EMR Dragon/transcription disclaimer: Some of this encounter note is an electronic transcription/translation of spoken language to printed text.  The electronic translation of spoken language may permit erroneous, or at times, nonsensical words or phrases to be inadvertently transcribed.  Although I have reviewed the note for such errors, some may still exist.    Electronically signed by PELON Salter CNP on 11/30/2022 at 8:10 PM

## 2022-12-22 ENCOUNTER — TELEPHONE (OUTPATIENT)
Dept: PRIMARY CARE CLINIC | Age: 47
End: 2022-12-22

## 2022-12-22 DIAGNOSIS — M79.7 FIBROMYALGIA: ICD-10-CM

## 2022-12-22 RX ORDER — DULOXETIN HYDROCHLORIDE 60 MG/1
60 CAPSULE, DELAYED RELEASE ORAL DAILY
Qty: 30 CAPSULE | Refills: 3 | Status: SHIPPED | OUTPATIENT
Start: 2022-12-22

## 2022-12-22 NOTE — TELEPHONE ENCOUNTER
Clay Hodges called to request a refill on her medication.       Last office visit : 11/30/2022   Next office visit : 12/30/2022     Requested Prescriptions     Pending Prescriptions Disp Refills    DULoxetine (CYMBALTA) 60 MG extended release capsule 30 capsule 3     Sig: Take 1 capsule by mouth daily            Bertram Garrett LPN

## 2022-12-22 NOTE — TELEPHONE ENCOUNTER
----- Message from Chelsy Delong sent at 12/21/2022  4:51 PM CST -----  Regarding: Cymbalta  Can I please get a refill on this? Im out for two days now and have appt the end of this month.   Its making me very dizzy

## 2022-12-30 ENCOUNTER — OFFICE VISIT (OUTPATIENT)
Dept: PRIMARY CARE CLINIC | Age: 47
End: 2022-12-30
Payer: COMMERCIAL

## 2022-12-30 VITALS
WEIGHT: 176 LBS | DIASTOLIC BLOOD PRESSURE: 70 MMHG | OXYGEN SATURATION: 99 % | HEART RATE: 95 BPM | SYSTOLIC BLOOD PRESSURE: 122 MMHG | BODY MASS INDEX: 29.29 KG/M2

## 2022-12-30 DIAGNOSIS — F33.9 RECURRENT MAJOR DEPRESSIVE DISORDER, REMISSION STATUS UNSPECIFIED (HCC): ICD-10-CM

## 2022-12-30 DIAGNOSIS — H66.003 NON-RECURRENT ACUTE SUPPURATIVE OTITIS MEDIA OF BOTH EARS WITHOUT SPONTANEOUS RUPTURE OF TYMPANIC MEMBRANES: Primary | ICD-10-CM

## 2022-12-30 DIAGNOSIS — Z12.11 ENCOUNTER FOR SCREENING COLONOSCOPY: ICD-10-CM

## 2022-12-30 DIAGNOSIS — F41.1 GENERALIZED ANXIETY DISORDER: ICD-10-CM

## 2022-12-30 DIAGNOSIS — M25.521 RIGHT ELBOW PAIN: ICD-10-CM

## 2022-12-30 PROCEDURE — 99214 OFFICE O/P EST MOD 30 MIN: CPT | Performed by: NURSE PRACTITIONER

## 2022-12-30 RX ORDER — CEFDINIR 300 MG/1
300 CAPSULE ORAL 2 TIMES DAILY
Qty: 20 CAPSULE | Refills: 0 | Status: SHIPPED | OUTPATIENT
Start: 2022-12-30 | End: 2023-01-09

## 2022-12-30 RX ORDER — PAROXETINE HYDROCHLORIDE HEMIHYDRATE 25 MG/1
25 TABLET, FILM COATED, EXTENDED RELEASE ORAL DAILY
Qty: 90 TABLET | Refills: 0 | Status: SHIPPED | OUTPATIENT
Start: 2022-12-30

## 2022-12-30 ASSESSMENT — ENCOUNTER SYMPTOMS
CHEST TIGHTNESS: 0
ABDOMINAL PAIN: 0
DIARRHEA: 0
COLOR CHANGE: 0
COUGH: 0
SHORTNESS OF BREATH: 0
NAUSEA: 0
VOMITING: 0
SORE THROAT: 0

## 2022-12-30 NOTE — PROGRESS NOTES
[facility]   [address] [place] [address], [facility]     Phone:  [phone]  Fax:  [phone]      Remi Dunham is a 56 y.o. female who presents today for her medical conditions/complaints as noted below. Remi Dunham is c/o of Follow-up (Pt states she is improving with medication changes )      Chief Complaint   Patient presents with    Follow-up     Pt states she is improving with medication changes        HPI:     HPI    Patient presents today for follow up from depression and anxiety. She reports work is good, but when she comes home she is easily irritated. Right elbow/arm is still sore is seeing Dr. Abhi Haynes. Patient reports she is recently had some congestion and pressure and popping in her ears.       Past Medical History:   Diagnosis Date    Anxiety     Asthma     Depression 1/13/2019    Hypothyroid 6/24/2013    Thyroid disorder     Yeast infection of nipple, postpartum 3/20/2014        Past Surgical History:   Procedure Laterality Date    HYSTERECTOMY (CERVIX STATUS UNKNOWN)      LAPAROSCOPY  06/11/2015    LIGAMENT REPAIR Right 12/29/2015    RIGHT ANKLE BROSTROM-LENTZ  performed by Elliot Regan MD at [address] [place] [address] (CERVIX NOT REMOVED)  7/13/15    Di Naveen    TUBAL LIGATION  3/14    TUBAL LIGATION      US BREAST NEEDLE BIOPSY LEFT Left 4/6/2022    US BREAST NEEDLE BIOPSY LEFT 4/6/2022 LPS GENERAL SURGERY    WISDOM TOOTH EXTRACTION  2011       Social History     Tobacco Use    Smoking status: Never    Smokeless tobacco: Never   Substance Use Topics    Alcohol use: No        Current Outpatient Medications   Medication Sig Dispense Refill    cefdinir (OMNICEF) 300 MG capsule Take 1 capsule by mouth 2 times daily for 10 days 20 capsule 0    PARoxetine (PAXIL CR) 25 MG extended release tablet Take 1 tablet by mouth daily 90 tablet 0    DULoxetine (CYMBALTA) 60 MG extended release capsule Take 1 capsule by mouth daily 30 capsule 3    hydrOXYzine HCl (ATARAX) 25 MG tablet Take 1 tablet by mouth 3 times daily as needed for Anxiety 90 tablet 0    montelukast (SINGULAIR) 10 MG tablet Take 1 tablet by mouth nightly 90 tablet 3    tiZANidine (ZANAFLEX) 2 MG tablet Take 1 tablet by mouth nightly as needed (sciatic pain) Can take 2 tablets at nightly if needed 30 tablet 1    ALLERGY RELIEF D-24  MG per extended release tablet TAKE ONE TABLET BY MOUTH ONE TIME DAILY 30 tablet 3    albuterol sulfate HFA (PROVENTIL HFA) 108 (90 Base) MCG/ACT inhaler Inhale 2 puffs into the lungs every 6 hours as needed for Wheezing 18 g 3    azelastine (ASTELIN) 0.1 % nasal spray 1 spray by Nasal route 2 times daily Use in each nostril as directed 2 Bottle 1    fluticasone (FLONASE) 50 MCG/ACT nasal spray 2 sprays by Nasal route daily 1 Bottle 0    Omega-3 Fatty Acids (FISH OIL) 1000 MG CPDR Take 3,000 mg by mouth      vitamin D (ERGOCALCIFEROL) 47219 units CAPS capsule Take one capsule by mouth once weekly for 6 weeks, then begin 5,000 units OTC daily. 6 capsule 0    Cetirizine HCl 10 MG CAPS Take 10 mg by mouth 2 times daily as needed        No current facility-administered medications for this visit. Allergies   Allergen Reactions    Adhesive Tape      blisters    Molds & Smuts Dermatitis, Hives, Itching, Rash, Shortness Of Breath and Swelling    Pollen Extract Dermatitis, Hives, Itching, Rash, Shortness Of Breath and Swelling    Wheat Bran      Makes sick at stomach      Wasp Venom Hives, Itching, Nausea And Vomiting and Swelling       Family History   Problem Relation Age of Onset    Heart Disease Mother     Diabetes Mother     Cancer Father     High Cholesterol Father                Subjective:      Review of Systems   Constitutional:  Negative for activity change and fever. HENT:  Positive for ear pain. Negative for congestion and sore throat. Respiratory:  Negative for cough, chest tightness and shortness of breath. Cardiovascular:  Negative for chest pain.    Gastrointestinal: Negative for abdominal pain, diarrhea, nausea and vomiting. Genitourinary:  Negative for frequency and urgency. Musculoskeletal:  Positive for arthralgias (right elbow). Negative for myalgias. Skin:  Negative for color change. Neurological:  Negative for dizziness, weakness and numbness. Psychiatric/Behavioral:  Negative for agitation. The patient is nervous/anxious. Objective:     Physical Exam  Vitals reviewed. Constitutional:       Appearance: Normal appearance. HENT:      Head: Normocephalic. Right Ear: Tympanic membrane is erythematous. Left Ear: Tympanic membrane is erythematous. Nose: Nose normal.      Mouth/Throat:      Mouth: Mucous membranes are moist.      Pharynx: Oropharynx is clear. Eyes:      Extraocular Movements: Extraocular movements intact. Pupils: Pupils are equal, round, and reactive to light. Cardiovascular:      Rate and Rhythm: Normal rate and regular rhythm. Pulses: Normal pulses. Heart sounds: Normal heart sounds. Pulmonary:      Effort: Pulmonary effort is normal.      Breath sounds: Normal breath sounds. Abdominal:      General: Bowel sounds are normal.      Palpations: Abdomen is soft. Musculoskeletal:         General: Normal range of motion. Right elbow: Tenderness present. Left elbow: No tenderness. Cervical back: Normal range of motion. Skin:     General: Skin is warm and dry. Neurological:      Mental Status: She is alert and oriented to person, place, and time. Psychiatric:         Attention and Perception: Attention and perception normal.         Mood and Affect: Mood and affect normal.         Speech: Speech normal.         Behavior: Behavior normal. Behavior is cooperative. Thought Content:  Thought content normal.         Cognition and Memory: Cognition and memory normal.         Judgment: Judgment normal.       /70   Pulse 95   Wt 176 lb (79.8 kg)   LMP 05/28/2015   SpO2 99%   BMI 29.29 kg/m²     Assessment:      Diagnosis Orders   1. Non-recurrent acute suppurative otitis media of both ears without spontaneous rupture of tympanic membranes  cefdinir (OMNICEF) 300 MG capsule      2. Recurrent major depressive disorder, remission status unspecified (HCC)  PARoxetine (PAXIL CR) 25 MG extended release tablet      3. Encounter for screening colonoscopy  Prudence Whipple MD, Gastroenterology, [place]      4. Generalized anxiety disorder        5. Right elbow pain            No results found for this visit on 12/30/22. Plan:     1. Non-recurrent acute suppurative otitis media of both ears without spontaneous rupture of tympanic membranes    - cefdinir (OMNICEF) 300 MG capsule; Take 1 capsule by mouth 2 times daily for 10 days  Dispense: 20 capsule; Refill: 0    2. Recurrent major depressive disorder, remission status unspecified (HCC)    - PARoxetine (PAXIL CR) 25 MG extended release tablet; Take 1 tablet by mouth daily  Dispense: 90 tablet; Refill: 0    3. Encounter for screening colonoscopy    - Prudence Whipple MD, Gastroenterology, [place]    4. Generalized anxiety disorder  Recommend taking hydroxyzine on a regular basis, identifying her triggers, and working to manage them. Patient verbalized understanding. 5. Right elbow pain  Continue to follow-up with Dr. Ivonne Cameron. Patient verbalized understanding. Return in about 3 months (around 3/30/2023) for 3 month f/u .     Orders Placed This Encounter   Procedures    Prudence Whipple MD, Gastroenterology, [place]     Referral Priority:   Routine     Referral Type:   Eval and Treat     Referral Reason:   Specialty Services Required     Referred to Provider:   Sandy David MD     Requested Specialty:   Gastroenterology     Number of Visits Requested:   1       Orders Placed This Encounter   Medications    cefdinir (OMNICEF) 300 MG capsule     Sig: Take 1 capsule by mouth 2 times daily for 10 days     Dispense:  20 capsule Refill:  0    PARoxetine (PAXIL CR) 25 MG extended release tablet     Sig: Take 1 tablet by mouth daily     Dispense:  90 tablet     Refill:  0            Patient offered educational handouts and has had all questions answered. Patient voices understanding and agrees to plans along with risks and benefits of plan. Patient is instructed to continue prior meds, diet, and exercise plans as instructed. Patient agrees to follow up as instructed and sooner if needed. Patient agrees to go to ER if condition becomes emergent. EMR Dragon/transcription disclaimer: Some of this encounter note is an electronic transcription/translation of spoken language to printed text. The electronic translation of spoken language may permit erroneous, or at times, nonsensical words or phrases to be inadvertently transcribed.  Although I have reviewed the note for such errors, some may still exist.    Electronically signed by PELON Lindsay CNP on 12/30/2022 at 11:50 PM

## 2023-03-13 ENCOUNTER — TELEPHONE (OUTPATIENT)
Dept: GASTROENTEROLOGY | Age: 48
End: 2023-03-13

## 2023-03-13 NOTE — TELEPHONE ENCOUNTER
Called patient to remind them of their procedure with Dr. Orlando Mercado  at [facility]  on 3/16/23 arrive at [address] [place] [address]

## 2023-03-15 ENCOUNTER — ANESTHESIA EVENT (OUTPATIENT)
Dept: OPERATING ROOM | Age: 48
End: 2023-03-15

## 2023-03-16 ENCOUNTER — ANESTHESIA (OUTPATIENT)
Dept: OPERATING ROOM | Age: 48
End: 2023-03-16

## 2023-03-16 ENCOUNTER — HOSPITAL ENCOUNTER (OUTPATIENT)
Age: 48
Setting detail: OUTPATIENT SURGERY
Discharge: HOME | End: 2023-03-16
Attending: INTERNAL MEDICINE | Admitting: INTERNAL MEDICINE

## 2023-03-16 ENCOUNTER — APPOINTMENT (OUTPATIENT)
Dept: OPERATING ROOM | Age: 48
End: 2023-03-16

## 2023-03-16 VITALS
HEART RATE: 73 BPM | HEIGHT: 65 IN | WEIGHT: 160 LBS | RESPIRATION RATE: 18 BRPM | BODY MASS INDEX: 26.66 KG/M2 | TEMPERATURE: 97.7 F | SYSTOLIC BLOOD PRESSURE: 109 MMHG | OXYGEN SATURATION: 100 % | DIASTOLIC BLOOD PRESSURE: 75 MMHG

## 2023-03-16 PROCEDURE — G8918 PT W/O PREOP ORDER IV AB PRO: HCPCS

## 2023-03-16 PROCEDURE — G0121 COLON CA SCRN NOT HI RSK IND: HCPCS

## 2023-03-16 PROCEDURE — G8907 PT DOC NO EVENTS ON DISCHARG: HCPCS

## 2023-03-16 RX ORDER — LIDOCAINE HYDROCHLORIDE 10 MG/ML
INJECTION, SOLUTION EPIDURAL; INFILTRATION; INTRACAUDAL; PERINEURAL PRN
Status: DISCONTINUED | OUTPATIENT
Start: 2023-03-16 | End: 2023-03-16 | Stop reason: SDUPTHER

## 2023-03-16 RX ORDER — SODIUM CHLORIDE, SODIUM LACTATE, POTASSIUM CHLORIDE, CALCIUM CHLORIDE 600; 310; 30; 20 MG/100ML; MG/100ML; MG/100ML; MG/100ML
INJECTION, SOLUTION INTRAVENOUS CONTINUOUS
Status: DISCONTINUED | OUTPATIENT
Start: 2023-03-16 | End: 2023-03-16 | Stop reason: HOSPADM

## 2023-03-16 RX ORDER — PROPOFOL 10 MG/ML
INJECTION, EMULSION INTRAVENOUS PRN
Status: DISCONTINUED | OUTPATIENT
Start: 2023-03-16 | End: 2023-03-16 | Stop reason: SDUPTHER

## 2023-03-16 RX ADMIN — LIDOCAINE HYDROCHLORIDE 30 MG: 10 INJECTION, SOLUTION EPIDURAL; INFILTRATION; INTRACAUDAL; PERINEURAL at 08:54

## 2023-03-16 RX ADMIN — PROPOFOL 250 MG: 10 INJECTION, EMULSION INTRAVENOUS at 08:54

## 2023-03-16 RX ADMIN — SODIUM CHLORIDE, SODIUM LACTATE, POTASSIUM CHLORIDE, CALCIUM CHLORIDE: 600; 310; 30; 20 INJECTION, SOLUTION INTRAVENOUS at 08:04

## 2023-03-16 ASSESSMENT — PAIN - FUNCTIONAL ASSESSMENT: PAIN_FUNCTIONAL_ASSESSMENT: 0-10

## 2023-03-16 NOTE — H&P
Patient Name: Yvan Hale  : 1975  MRN: [id number]  DATE: 23    Allergies: Allergies   Allergen Reactions    Adhesive Tape      blisters    Molds & Smuts Dermatitis, Hives, Itching, Rash, Shortness Of Breath and Swelling    Pollen Extract Dermatitis, Hives, Itching, Rash, Shortness Of Breath and Swelling    Wheat Bran      Makes sick at stomach      Wasp Venom Hives, Itching, Nausea And Vomiting and Swelling        ENDOSCOPY  History and Physical    Procedure:    [] Diagnostic Colonoscopy       [x] Screening Colonoscopy  [] EGD      [] ERCP      [] EUS       [] Other    [x] Previous office notes/History and Physical reviewed from the patients chart. Please see EMR for further details of HPI. I have examined the patient's status immediately prior to the procedure and:      Indications/HPI:    []Abdominal Pain   []Barretts  [x]Screening/Surveillance   []History of Polyps  []Dysphagia            [] +Cologard/DNA testing  []Abnormal Imaging              []EOE Hx              [] Family Hx of CRC/Polyps  []Anemia                            []Food Impaction       []Recent Poor Prep  []GI Bleed             []Lymphadenopathy  []History of Polyps  []Change in bowel habits []Heartburn/Reflux  []Cancer- GI/Lung  []Chest Pain - Non Cardiac []Heme (+) Stool []Ulcers  []Constipation  []Hemoptysis  []Incontinence    []Diarrhea  []Hypoxemia  []Rectal Bleed (BRBPR)  []Nausea/Vomiting   [] Varices  []Crohns/Colitis  []Pancreatic Cyst   [] Cirrhosis   []Pancreatitis    []Abnormal MRCP  []Elevated LFT [] Stent Removal, Previous ERCP  []Other:     Anesthesia:   [x] MAC [] Moderate Sedation   [] General   [] None     ROS: 12 pt Review of Symptoms was negative unless mentioned above    Medications:   Prior to Admission medications    Medication Sig Start Date End Date Taking?  Authorizing Provider   PARoxetine (PAXIL CR) 25 MG extended release tablet Take 1 tablet by mouth daily 22   PELON Soares - HEENA DULoxetine (CYMBALTA) 60 MG extended release capsule Take 1 capsule by mouth daily 12/22/22   PELON Gilbert CNP   montelukast (SINGULAIR) 10 MG tablet Take 1 tablet by mouth nightly 10/31/22   PELON Gilbert - CNP   tiZANidine (ZANAFLEX) 2 MG tablet Take 1 tablet by mouth nightly as needed (sciatic pain) Can take 2 tablets at nightly if needed 7/6/22   Bianca Bryant MD   ALLERGY RELIEF D-24  MG per extended release tablet TAKE ONE TABLET BY MOUTH ONE TIME DAILY 4/11/22   PELON Rashid CNM   albuterol sulfate HFA (PROVENTIL HFA) 108 (90 Base) MCG/ACT inhaler Inhale 2 puffs into the lungs every 6 hours as needed for Wheezing 11/3/21   Joshua Renee MD   azelastine (ASTELIN) 0.1 % nasal spray 1 spray by Nasal route 2 times daily Use in each nostril as directed 7/2/21   Bianca Bryant MD   fluticasone Georjean Byes) 50 MCG/ACT nasal spray 2 sprays by Nasal route daily 6/21/21   Kerri Lara PA-C   Omega-3 Fatty Acids (FISH OIL) 1000 MG CPDR Take 3,000 mg by mouth    Historical Provider, MD   vitamin D (ERGOCALCIFEROL) 08105 units CAPS capsule Take one capsule by mouth once weekly for 6 weeks, then begin 5,000 units OTC daily.  1/18/19   PELON Porter   Cetirizine HCl 10 MG CAPS Take 10 mg by mouth 2 times daily as needed     Historical Provider, MD       Past Medical History:  Past Medical History:   Diagnosis Date    Anxiety     Asthma     Depression 01/13/2019    Hypothyroid 06/24/2013    Thyroid disorder     Yeast infection of nipple, postpartum 03/20/2014       Past Surgical History:  Past Surgical History:   Procedure Laterality Date    HYSTERECTOMY (CERVIX STATUS UNKNOWN)      LAPAROSCOPY  06/11/2015    LIGAMENT REPAIR Right 12/29/2015    RIGHT ANKLE BROSTROM-LENTZ  performed by Ladarius Angel MD at [address] [place] [address] (CERVIX NOT REMOVED)  7/13/15    Di Naveen    TUBAL LIGATION  3/14    TUBAL LIGATION      US BREAST BIOPSY W LOC DEVICE 1ST LESION LEFT Left 4/6/2022 US BREAST NEEDLE BIOPSY LEFT 4/6/2022 LPS GENERAL SURGERY    WISDOM TOOTH EXTRACTION  2011       Social History:  Social History     Tobacco Use    Smoking status: Never    Smokeless tobacco: Never   Vaping Use    Vaping Use: Never used   Substance Use Topics    Alcohol use: No    Drug use: No       Vital Signs:   Vitals:    03/16/23 0748   BP: 115/76   Pulse: 85   Resp: 17   Temp: 97.6 °F (36.4 °C)   SpO2: 97%        Physical Exam:  Cardiac:  [x]WNL  []Comments:  Pulmonary:  [x]WNL   []Comments:  Neuro/Mental Status:  [x]WNL  []Comments:  Abdominal:  [x]WNL    []Comments:  Other:   []WNL  []Comments:    Informed Consent:  The risks and benefits of the procedure have been discussed with either the patient or if they cannot consent, their representative. Assessment:  Patient examined and appropriate for planned sedation and procedure. Plan:  Proceed with planned sedation and procedure as above. Jaleesa Elena, am scribing for and in the presence of Dr. Denis Holcomb MD.  Electronically signed by Brandy Charles RN on 3/16/2023 at 7:59 AM    I personally performed the services described in this documentation as scribed by Brandy Charles, and it appears accurate and complete.      Denis Holcomb MD  3/16/2023

## 2023-03-16 NOTE — ANESTHESIA PRE PROCEDURE
Department of Anesthesiology  Preprocedure Note       Name:  Chelsy Delong   Age:  53 y.o.  :  1975                                          MRN:  [id number]         Date:  3/16/2023      Surgeon: Mayra Collins):  Julio César Loo MD    Procedure: Procedure(s):  COLORECTAL CANCER SCREENING, NOT HIGH RISK    Medications prior to admission:   Prior to Admission medications    Medication Sig Start Date End Date Taking? Authorizing Provider   PARoxetine (PAXIL CR) 25 MG extended release tablet Take 1 tablet by mouth daily 22   Elsworth Needs, APRN - CNP   DULoxetine (CYMBALTA) 60 MG extended release capsule Take 1 capsule by mouth daily 22   Elsworth Needs, APRN - CNP   montelukast (SINGULAIR) 10 MG tablet Take 1 tablet by mouth nightly 10/31/22   Elsworth Needs, APRN - CNP   tiZANidine (ZANAFLEX) 2 MG tablet Take 1 tablet by mouth nightly as needed (sciatic pain) Can take 2 tablets at nightly if needed 22   Li Lott MD   ALLERGY RELIEF D-24  MG per extended release tablet TAKE ONE TABLET BY MOUTH ONE TIME DAILY 22   PELON Mitchell - WANDY   albuterol sulfate HFA (PROVENTIL HFA) 108 (90 Base) MCG/ACT inhaler Inhale 2 puffs into the lungs every 6 hours as needed for Wheezing 11/3/21   Larisa Cabral MD   azelastine (ASTELIN) 0.1 % nasal spray 1 spray by Nasal route 2 times daily Use in each nostril as directed 21   Li Lott MD   fluticasone [facility]) 50 MCG/ACT nasal spray 2 sprays by Nasal route daily 21   Carin Christy PA-C   Omega-3 Fatty Acids (FISH OIL) 1000 MG CPDR Take 3,000 mg by mouth    Historical Provider, MD   vitamin D (ERGOCALCIFEROL) 99337 units CAPS capsule Take one capsule by mouth once weekly for 6 weeks, then begin 5,000 units OTC daily.  19   Terry Nones, APRDIVYA   Cetirizine HCl 10 MG CAPS Take 10 mg by mouth 2 times daily as needed     Historical Provider, MD       Current medications:    Current Facility-Administered Medications   Medication Dose Route Frequency Provider Last Rate Last Admin    lactated ringers IV soln infusion   IntraVENous Continuous Norma Garcia MD           Allergies: Allergies   Allergen Reactions    Adhesive Tape      blisters    Molds & Smuts Dermatitis, Hives, Itching, Rash, Shortness Of Breath and Swelling    Pollen Extract Dermatitis, Hives, Itching, Rash, Shortness Of Breath and Swelling    Wheat Bran      Makes sick at stomach      Wasp Venom Hives, Itching, Nausea And Vomiting and Swelling       Problem List:    Patient Active Problem List   Diagnosis Code    S/P tubal ligation Z98.51    Right ankle instability M25.371    Fibromyalgia M79.7    Other chronic pain G89.29    Arthralgia M25.50    Depressive disorder F32. 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       Past Medical History:        Diagnosis Date    Anxiety     Asthma     Depression 01/13/2019    Hypothyroid 06/24/2013    Thyroid disorder     Yeast infection of nipple, postpartum 03/20/2014       Past Surgical History:        Procedure Laterality Date    HYSTERECTOMY (CERVIX STATUS UNKNOWN)      LAPAROSCOPY  06/11/2015    LIGAMENT REPAIR Right 12/29/2015    RIGHT ANKLE BROSTROM-LENTZ  performed by Luli Lemus MD at [address] [place] [address] (CERVIX NOT REMOVED)  7/13/15    Di Naveen    TUBAL LIGATION  3/14    TUBAL LIGATION      US BREAST BIOPSY W LOC DEVICE 1ST LESION LEFT Left 4/6/2022    US BREAST NEEDLE BIOPSY LEFT 4/6/2022 LPS GENERAL SURGERY    WISDOM TOOTH EXTRACTION  2011       Social History:    Social History     Tobacco Use    Smoking status: Never    Smokeless tobacco: Never   Substance Use Topics    Alcohol use:  No                                Counseling given: Not Answered      Vital Signs (Current):   Vitals:    03/16/23 0748   BP: 115/76   Pulse: 85   Resp: 17   Temp: 97.6 °F (36.4 °C)   SpO2: 97%   Weight: 160 lb (72.6 kg)   Height: 5' 5\" (1.651 m)                                              BP Readings from Last 3 Encounters:   03/16/23 115/76   12/30/22 122/70   11/30/22 128/70       NPO Status: Time of last liquid consumption: 0300                        Time of last solid consumption: 1200                        Date of last liquid consumption: 03/16/23                        Date of last solid food consumption: 03/14/23    BMI:   Wt Readings from Last 3 Encounters:   03/16/23 160 lb (72.6 kg)   12/30/22 176 lb (79.8 kg)   11/30/22 176 lb 12.8 oz (80.2 kg)     Body mass index is 26.63 kg/m². CBC:   Lab Results   Component Value Date/Time    WBC 12.7 01/24/2022 10:49 AM    RBC 4.48 01/24/2022 10:49 AM    HGB 13.4 01/24/2022 10:49 AM    HCT 42.1 01/24/2022 10:49 AM    MCV 94.0 01/24/2022 10:49 AM    RDW 13.2 01/24/2022 10:49 AM     01/24/2022 10:49 AM       CMP:   Lab Results   Component Value Date/Time     01/20/2021 03:20 PM    K 4.5 01/20/2021 03:20 PM     01/20/2021 03:20 PM    CO2 27 01/20/2021 03:20 PM    BUN 10 01/20/2021 03:20 PM    CREATININE 0.5 01/20/2021 03:20 PM    GFRAA >59 01/20/2021 03:20 PM    LABGLOM >60 01/20/2021 03:20 PM    GLUCOSE 79 01/20/2021 03:20 PM    PROT 7.3 01/20/2021 03:20 PM    PROT 7.1 12/20/2012 01:00 AM    CALCIUM 9.3 01/20/2021 03:20 PM    BILITOT <0.2 01/20/2021 03:20 PM    ALKPHOS 91 01/20/2021 03:20 PM    AST 27 01/20/2021 03:20 PM    ALT 34 01/20/2021 03:20 PM       POC Tests: No results for input(s): POCGLU, POCNA, POCK, POCCL, POCBUN, POCHEMO, POCHCT in the last 72 hours.     Coags: No results found for: PROTIME, INR, APTT    HCG (If Applicable):   Lab Results   Component Value Date    PREGTESTUR NEGATIVE 06/10/2015        ABGs: No results found for: PHART, PO2ART, DFA5AXO, DEO2TFV, BEART, K9BMAKRK     Type & Screen (If Applicable):  No results found for: LABABO, LABRH    Drug/Infectious Status (If Applicable):  No results found for: HIV, HEPCAB    COVID-19 Screening (If Applicable): No results found for: COVID19        Anesthesia Evaluation  Patient summary reviewed and Nursing notes reviewed  Airway: Mallampati: II  TM distance: >3 FB   Neck ROM: full  Mouth opening: > = 3 FB   Dental: normal exam         Pulmonary:Negative Pulmonary ROS and normal exam    (+) asthma:                            Cardiovascular:Negative CV ROS  Exercise tolerance: good (>4 METS),            Beta Blocker:  Not on Beta Blocker         Neuro/Psych:   (+) neuromuscular disease:, psychiatric history:            GI/Hepatic/Renal: Neg GI/Hepatic/Renal ROS            Endo/Other:    (+) hypothyroidism::., .                 Abdominal:             Vascular: negative vascular ROS. Other Findings:           Anesthesia Plan      general and TIVA     ASA 3       Induction: intravenous. Anesthetic plan and risks discussed with patient. Plan discussed with CRNA.                     PELON Phelps - CRNA   3/16/2023

## 2023-03-16 NOTE — OP NOTE
Patient: Jayce James : 1975  [facility] Rec#: [id number] Acc#: [id number]   Primary Care Provider PELON Morgan CNP    Date of Procedure:  3/16/2023    Endoscopist: Jeanne Ramos MD    Referring Provider: PELON Morgan CNP,     Operation Performed: Colonoscopy     Indications: Screening    Anesthesia:  Sedation was administered by anesthesia who monitored the patient during the procedure. I met with Jayce James prior to procedure. We discussed the procedure itself, and I have discussed the risks of endoscopy (including-- but not limited to-- pain, discomfort, bleeding potentially requiring second endoscopic procedure and/or blood transfusion, organ perforation requiring operative repair, damage to organs near the colon, infection, aspiration, cardiopulmonary/allergic reaction), benefits, indications to endoscopy. Additionally, we discussed options other than colonoscopy. The patient expressed understanding. All questions answered. The patient decided to proceed with the procedure. Signed informed consent was placed on the chart. Blood Loss: minimal    Withdrawal time: >6mins  Bowel Prep: adequate     Complications: no immediate complications    DESCRIPTION OF PROCEDURE:     A time out was performed. After written informed consent was obtained, the patient was placed in the left lateral position. The perianal area was inspected, and a digital rectal exam was performed. A rectal exam was performed: normal tone, no palpable lesions. At this point, a forward viewing Olympus colonoscope was inserted into the anus and carefully advanced to the cecum. The cecum was identified by the ileocecal valve and the appendiceal orifice. The colonoscope was then slowly withdrawn with careful inspection of the mucosa in a linear and circumferential fashion. The scope was retroflexed in the rectum. Suction was utilized during the procedure to remove as much air as possible from the bowel.  The colonoscope was removed from the patient, and the procedure was terminated. Findings are listed below. Findings: The mucosa appeared normal throughout the entire examined colon. Retroflexion in the rectum was normal and revealed no further abnormalities. Recommendations:  1. Repeat colonoscopy: 10 years    Findings and recommendations were discussed w/ the patient. A copy of the images was provided. Gwyn Gaitan am scribing for and in the presence of Dr. Esther Sterling MD.  Electronically signed by Edison Dorsey RN on 3/16/2023 at 8:00 AM    I personally performed the services described in this documentation as scribed by Edison Dorsey, and it appears accurate and complete.      Esther Sterling MD  3/16/2023

## 2023-03-16 NOTE — ANESTHESIA POSTPROCEDURE EVALUATION
Department of Anesthesiology  Postprocedure Note    Patient: Ben Charlton  MRN: [id number]  YOB: 1975  Date of evaluation: 3/16/2023      Procedure Summary     Date: 03/16/23 Room / Location: [facility] ENDO 01 / [address]    Anesthesia Start: 3312 Anesthesia Stop:     Procedure: COLORECTAL CANCER SCREENING, NOT HIGH RISK (Abdomen) Diagnosis:       Colon cancer screening      (Colon cancer screening [Z12.11])    Surgeons: Remi Caldwell MD Responsible Provider: PELON Lucio CRNA    Anesthesia Type: general, TIVA ASA Status: 3          Anesthesia Type: No value filed.     Jazmin Phase I:      Jazmin Phase II:        Anesthesia Post Evaluation    Patient location during evaluation: bedside  Patient participation: complete - patient participated  Level of consciousness: sleepy but conscious  Pain score: 0  Airway patency: patent  Nausea & Vomiting: no nausea and no vomiting  Complications: no  Cardiovascular status: blood pressure returned to baseline  Respiratory status: acceptable, room air and spontaneous ventilation  Hydration status: euvolemic

## 2023-03-17 ENCOUNTER — HOSPITAL ENCOUNTER (OUTPATIENT)
Dept: MRI IMAGING | Age: 48
Discharge: HOME | End: 2023-03-17
Payer: COMMERCIAL

## 2023-03-17 DIAGNOSIS — M25.521 PAIN IN RIGHT ELBOW: ICD-10-CM

## 2023-03-17 DIAGNOSIS — S52.124D NONDISPLACED FRACTURE OF HEAD OF RIGHT RADIUS, SUBSEQUENT ENCOUNTER FOR CLOSED FRACTURE WITH ROUTINE HEALING: ICD-10-CM

## 2023-03-17 PROCEDURE — 73221 MRI JOINT UPR EXTREM W/O DYE: CPT

## 2023-03-23 RX ORDER — ONDANSETRON 8 MG/1
8 TABLET, ORALLY DISINTEGRATING ORAL EVERY 8 HOURS PRN
Qty: 20 TABLET | Refills: 3 | Status: SHIPPED | OUTPATIENT
Start: 2023-03-23

## 2023-03-23 RX ORDER — FAMOTIDINE 20 MG/1
20 TABLET, FILM COATED ORAL 2 TIMES DAILY
Qty: 60 TABLET | Refills: 5 | Status: SHIPPED | OUTPATIENT
Start: 2023-03-23

## 2023-03-28 DIAGNOSIS — R39.9 UTI SYMPTOMS: Primary | ICD-10-CM

## 2023-03-28 DIAGNOSIS — R39.9 UTI SYMPTOMS: ICD-10-CM

## 2023-03-28 DIAGNOSIS — M79.7 FIBROMYALGIA: ICD-10-CM

## 2023-03-28 LAB
BACTERIA #/AREA URNS HPF: ABNORMAL /HPF
BILIRUB UR QL STRIP: ABNORMAL
CLARITY UR: ABNORMAL
COLOR UR: ABNORMAL
GLUCOSE UR STRIP.AUTO-MCNC: NEGATIVE MG/DL
HGB UR STRIP.AUTO-MCNC: ABNORMAL MG/L
KETONES UR STRIP.AUTO-MCNC: NEGATIVE MG/DL
LEUKOCYTE ESTERASE UR QL STRIP.AUTO: ABNORMAL
NITRITE UR QL STRIP.AUTO: POSITIVE
PH UR STRIP.AUTO: 5 [PH] (ref 5–8)
PROT UR STRIP.AUTO-MCNC: 30 MG/DL
RBC #/AREA URNS HPF: ABNORMAL /HPF (ref 0–2)
SP GR UR STRIP.AUTO: 1.02 (ref 1–1.03)
SQUAMOUS #/AREA URNS HPF: ABNORMAL /HPF
UROBILINOGEN UR STRIP.AUTO-MCNC: 1 E.U./DL
WBC #/AREA URNS HPF: ABNORMAL /HPF (ref 0–5)
YEAST #/AREA URNS HPF: PRESENT /HPF

## 2023-03-28 RX ORDER — SULFAMETHOXAZOLE AND TRIMETHOPRIM 800; 160 MG/1; MG/1
1 TABLET ORAL 2 TIMES DAILY
Qty: 6 TABLET | Refills: 0 | Status: SHIPPED | OUTPATIENT
Start: 2023-03-28 | End: 2023-03-31

## 2023-03-28 RX ORDER — DULOXETIN HYDROCHLORIDE 60 MG/1
CAPSULE, DELAYED RELEASE ORAL
Qty: 30 CAPSULE | Refills: 3 | Status: SHIPPED | OUTPATIENT
Start: 2023-03-28

## 2023-03-28 NOTE — TELEPHONE ENCOUNTER
Camron Guerrero called to request a refill on her medication.       Last office visit : 12/30/2022   Next office visit : 3/31/2023     Requested Prescriptions     Pending Prescriptions Disp Refills    DULoxetine (CYMBALTA) 60 MG extended release capsule [Pharmacy Med Name: DULOXETINE HCL 60MG CPEP] 30 capsule 3     Sig: TAKE ONE CAPSULE ONE TIME DAILY            Melecio Zhu LPN

## 2023-03-30 LAB
BACTERIA UR CULT: ABNORMAL
BACTERIA UR CULT: ABNORMAL
ORGANISM: ABNORMAL

## 2023-03-30 RX ORDER — CIPROFLOXACIN 500 MG/1
500 TABLET, FILM COATED ORAL 2 TIMES DAILY
Qty: 6 TABLET | Refills: 0 | Status: SHIPPED | OUTPATIENT
Start: 2023-03-30 | End: 2023-04-02

## 2023-03-31 ENCOUNTER — OFFICE VISIT (OUTPATIENT)
Dept: PRIMARY CARE CLINIC | Age: 48
End: 2023-03-31
Payer: COMMERCIAL

## 2023-03-31 VITALS
WEIGHT: 155 LBS | TEMPERATURE: 97.4 F | HEART RATE: 83 BPM | SYSTOLIC BLOOD PRESSURE: 110 MMHG | OXYGEN SATURATION: 98 % | DIASTOLIC BLOOD PRESSURE: 72 MMHG | BODY MASS INDEX: 25.79 KG/M2

## 2023-03-31 DIAGNOSIS — F33.9 RECURRENT MAJOR DEPRESSIVE DISORDER, REMISSION STATUS UNSPECIFIED (HCC): ICD-10-CM

## 2023-03-31 DIAGNOSIS — J40 BRONCHITIS: Primary | ICD-10-CM

## 2023-03-31 DIAGNOSIS — B37.9 YEAST INFECTION: ICD-10-CM

## 2023-03-31 PROCEDURE — 99214 OFFICE O/P EST MOD 30 MIN: CPT | Performed by: NURSE PRACTITIONER

## 2023-03-31 RX ORDER — FLUCONAZOLE 150 MG/1
150 TABLET ORAL
Qty: 2 TABLET | Refills: 0 | Status: SHIPPED | OUTPATIENT
Start: 2023-03-31 | End: 2023-04-06

## 2023-03-31 RX ORDER — PAROXETINE HYDROCHLORIDE HEMIHYDRATE 25 MG/1
25 TABLET, FILM COATED, EXTENDED RELEASE ORAL DAILY
Qty: 90 TABLET | Refills: 0 | Status: SHIPPED | OUTPATIENT
Start: 2023-03-31

## 2023-03-31 RX ORDER — METHYLPREDNISOLONE 4 MG/1
TABLET ORAL
Qty: 1 KIT | Refills: 0 | Status: SHIPPED | OUTPATIENT
Start: 2023-03-31 | End: 2023-04-06

## 2023-03-31 RX ORDER — CIPROFLOXACIN 500 MG/1
500 TABLET, FILM COATED ORAL 2 TIMES DAILY
Qty: 6 TABLET | Refills: 0 | Status: SHIPPED | OUTPATIENT
Start: 2023-03-31 | End: 2023-04-03

## 2023-03-31 SDOH — ECONOMIC STABILITY: INCOME INSECURITY: HOW HARD IS IT FOR YOU TO PAY FOR THE VERY BASICS LIKE FOOD, HOUSING, MEDICAL CARE, AND HEATING?: NOT VERY HARD

## 2023-03-31 SDOH — ECONOMIC STABILITY: FOOD INSECURITY: WITHIN THE PAST 12 MONTHS, YOU WORRIED THAT YOUR FOOD WOULD RUN OUT BEFORE YOU GOT MONEY TO BUY MORE.: NEVER TRUE

## 2023-03-31 SDOH — ECONOMIC STABILITY: FOOD INSECURITY: WITHIN THE PAST 12 MONTHS, THE FOOD YOU BOUGHT JUST DIDN'T LAST AND YOU DIDN'T HAVE MONEY TO GET MORE.: NEVER TRUE

## 2023-03-31 SDOH — ECONOMIC STABILITY: HOUSING INSECURITY
IN THE LAST 12 MONTHS, WAS THERE A TIME WHEN YOU DID NOT HAVE A STEADY PLACE TO SLEEP OR SLEPT IN A SHELTER (INCLUDING NOW)?: NO

## 2023-03-31 ASSESSMENT — PATIENT HEALTH QUESTIONNAIRE - PHQ9
SUM OF ALL RESPONSES TO PHQ QUESTIONS 1-9: 5
2. FEELING DOWN, DEPRESSED OR HOPELESS: 0
4. FEELING TIRED OR HAVING LITTLE ENERGY: 0
SUM OF ALL RESPONSES TO PHQ9 QUESTIONS 1 & 2: 0
1. LITTLE INTEREST OR PLEASURE IN DOING THINGS: 0
SUM OF ALL RESPONSES TO PHQ QUESTIONS 1-9: 5
SUM OF ALL RESPONSES TO PHQ QUESTIONS 1-9: 5
3. TROUBLE FALLING OR STAYING ASLEEP: 2
SUM OF ALL RESPONSES TO PHQ QUESTIONS 1-9: 5
8. MOVING OR SPEAKING SO SLOWLY THAT OTHER PEOPLE COULD HAVE NOTICED. OR THE OPPOSITE, BEING SO FIGETY OR RESTLESS THAT YOU HAVE BEEN MOVING AROUND A LOT MORE THAN USUAL: 1
6. FEELING BAD ABOUT YOURSELF - OR THAT YOU ARE A FAILURE OR HAVE LET YOURSELF OR YOUR FAMILY DOWN: 0
9. THOUGHTS THAT YOU WOULD BE BETTER OFF DEAD, OR OF HURTING YOURSELF: 0
5. POOR APPETITE OR OVEREATING: 1
7. TROUBLE CONCENTRATING ON THINGS, SUCH AS READING THE NEWSPAPER OR WATCHING TELEVISION: 1
10. IF YOU CHECKED OFF ANY PROBLEMS, HOW DIFFICULT HAVE THESE PROBLEMS MADE IT FOR YOU TO DO YOUR WORK, TAKE CARE OF THINGS AT HOME, OR GET ALONG WITH OTHER PEOPLE: 1

## 2023-03-31 ASSESSMENT — ENCOUNTER SYMPTOMS
COUGH: 1
WHEEZING: 0
SINUS PRESSURE: 1
CHEST TIGHTNESS: 0
ABDOMINAL PAIN: 0
VOMITING: 0
SHORTNESS OF BREATH: 0
SORE THROAT: 0
NAUSEA: 0
DIARRHEA: 0
COLOR CHANGE: 0

## 2023-03-31 NOTE — PROGRESS NOTES
Itching, Rash, Shortness Of Breath and Swelling    Wheat Bran      Makes sick at stomach      Wasp Venom Hives, Itching, Nausea And Vomiting and Swelling       Family History   Problem Relation Age of Onset    Heart Disease Mother     Diabetes Mother     Cancer Father     High Cholesterol Father                Subjective:      Review of Systems   Constitutional:  Negative for activity change and fever. HENT:  Positive for congestion and sinus pressure. Negative for ear pain and sore throat. Respiratory:  Positive for cough. Negative for chest tightness, shortness of breath and wheezing. Cardiovascular:  Negative for chest pain. Gastrointestinal:  Negative for abdominal pain, diarrhea, nausea and vomiting. Genitourinary:  Negative for frequency and urgency. Musculoskeletal:  Negative for arthralgias and myalgias. Skin:  Negative for color change. Neurological:  Negative for dizziness, weakness and numbness. Psychiatric/Behavioral:  Positive for dysphoric mood. Negative for agitation. The patient is nervous/anxious. Objective:     Physical Exam  Vitals reviewed. Constitutional:       Appearance: Normal appearance. HENT:      Head: Normocephalic. Right Ear: Tympanic membrane normal.      Left Ear: Tympanic membrane normal.      Nose: Congestion present. Right Sinus: Maxillary sinus tenderness present. Left Sinus: Maxillary sinus tenderness present. Mouth/Throat:      Mouth: Mucous membranes are moist.      Pharynx: Oropharynx is clear. Eyes:      Extraocular Movements: Extraocular movements intact. Pupils: Pupils are equal, round, and reactive to light. Cardiovascular:      Rate and Rhythm: Normal rate and regular rhythm. Pulses: Normal pulses. Heart sounds: Normal heart sounds. Pulmonary:      Effort: Pulmonary effort is normal.      Breath sounds: Normal breath sounds.    Abdominal:      General: Bowel sounds are normal.      Palpations: Abdomen is

## 2023-04-03 DIAGNOSIS — J40 BRONCHITIS: Primary | ICD-10-CM

## 2023-04-03 RX ORDER — AZITHROMYCIN 250 MG/1
250 TABLET, FILM COATED ORAL SEE ADMIN INSTRUCTIONS
Qty: 6 TABLET | Refills: 0 | Status: SHIPPED | OUTPATIENT
Start: 2023-04-03 | End: 2023-04-08

## 2023-05-17 ENCOUNTER — OFFICE VISIT (OUTPATIENT)
Age: 48
End: 2023-05-17
Payer: COMMERCIAL

## 2023-05-17 VITALS
RESPIRATION RATE: 19 BRPM | TEMPERATURE: 98.4 F | OXYGEN SATURATION: 98 % | WEIGHT: 159 LBS | DIASTOLIC BLOOD PRESSURE: 68 MMHG | HEART RATE: 84 BPM | HEIGHT: 65 IN | BODY MASS INDEX: 26.49 KG/M2 | SYSTOLIC BLOOD PRESSURE: 122 MMHG

## 2023-05-17 DIAGNOSIS — L08.9 SKIN INFECTION: Primary | ICD-10-CM

## 2023-05-17 DIAGNOSIS — R59.1 LYMPHADENOPATHY: ICD-10-CM

## 2023-05-17 PROCEDURE — 99213 OFFICE O/P EST LOW 20 MIN: CPT | Performed by: NURSE PRACTITIONER

## 2023-05-17 RX ORDER — SULFAMETHOXAZOLE AND TRIMETHOPRIM 800; 160 MG/1; MG/1
1 TABLET ORAL 2 TIMES DAILY
Qty: 14 TABLET | Refills: 0 | Status: SHIPPED | OUTPATIENT
Start: 2023-05-17 | End: 2023-05-24

## 2023-05-17 ASSESSMENT — ENCOUNTER SYMPTOMS
RESPIRATORY NEGATIVE: 1
ALLERGIC/IMMUNOLOGIC NEGATIVE: 1
EYES NEGATIVE: 1
GASTROINTESTINAL NEGATIVE: 1

## 2023-05-17 NOTE — PATIENT INSTRUCTIONS
Completely finish antibiotic. Antibiotics may decrease the effectiveness of your birth control. Be sure to use another form of birth control for the next 2 weeks. Apply antibiotic ointment as prescribed. Avoid scratching areas. Follow up with PCP if persist or worsens.

## 2023-05-17 NOTE — PROGRESS NOTES
Gerry Smith (:  1975) is a 53 y.o. female,Established patient, here for evaluation of the following chief complaint(s): Other (Possible scalp infection)    Patient presents today complaining of itchy spots on her scalp that she has been scratching. She states she has a lot of anxiety and tends to scratch her scalp. The areas are crusted and now she has some swollen lymph nodes in her posterior cervical area. Denies any other symptoms at this time. Discussed I am prescribing Bactrim and Bactroban ointment to cover for skin infection and MRSA. Care instructions discussed. Patient verbalized understanding and agrees to plan of care. ASSESSMENT/PLAN:  1. Skin infection  -     sulfamethoxazole-trimethoprim (BACTRIM DS;SEPTRA DS) 800-160 MG per tablet; Take 1 tablet by mouth 2 times daily for 7 days, Disp-14 tablet, R-0Normal  -     mupirocin (BACTROBAN) 2 % ointment; Apply topically 3 times daily. , Disp-3 g, R-0, Normal  2. Lymphadenopathy     Orders Placed This Encounter   Medications    sulfamethoxazole-trimethoprim (BACTRIM DS;SEPTRA DS) 800-160 MG per tablet     Sig: Take 1 tablet by mouth 2 times daily for 7 days     Dispense:  14 tablet     Refill:  0    mupirocin (BACTROBAN) 2 % ointment     Sig: Apply topically 3 times daily. Dispense:  3 g     Refill:  0        Return if symptoms worsen or fail to improve. Subjective   SUBJECTIVE/OBJECTIVE:  Other      Review of Systems   Constitutional: Negative. HENT: Negative. Eyes: Negative. Respiratory: Negative. Cardiovascular: Negative. Gastrointestinal: Negative. Endocrine: Negative. Genitourinary: Negative. Musculoskeletal: Negative. Skin: Negative. Scabbed over bumps on scalp   Allergic/Immunologic: Negative. Neurological: Negative. Hematological: Negative. Psychiatric/Behavioral: Negative. Objective   Physical Exam  Vitals reviewed.    Lymphadenopathy:      Cervical:

## 2023-08-08 DIAGNOSIS — F33.9 RECURRENT MAJOR DEPRESSIVE DISORDER, REMISSION STATUS UNSPECIFIED (HCC): ICD-10-CM

## 2023-08-08 RX ORDER — PAROXETINE HYDROCHLORIDE HEMIHYDRATE 25 MG/1
TABLET, FILM COATED, EXTENDED RELEASE ORAL
Qty: 90 TABLET | Refills: 0 | Status: SHIPPED | OUTPATIENT
Start: 2023-08-08

## 2023-08-14 ENCOUNTER — OFFICE VISIT (OUTPATIENT)
Dept: PRIMARY CARE CLINIC | Age: 48
End: 2023-08-14
Payer: COMMERCIAL

## 2023-08-14 VITALS
TEMPERATURE: 97 F | WEIGHT: 167.8 LBS | BODY MASS INDEX: 27.96 KG/M2 | HEIGHT: 65 IN | OXYGEN SATURATION: 98 % | SYSTOLIC BLOOD PRESSURE: 118 MMHG | HEART RATE: 72 BPM | DIASTOLIC BLOOD PRESSURE: 70 MMHG

## 2023-08-14 DIAGNOSIS — F33.9 RECURRENT MAJOR DEPRESSIVE DISORDER, REMISSION STATUS UNSPECIFIED (HCC): ICD-10-CM

## 2023-08-14 DIAGNOSIS — G44.209 TENSION HEADACHE: Primary | ICD-10-CM

## 2023-08-14 DIAGNOSIS — M25.842 CYST OF JOINT OF LEFT HAND: ICD-10-CM

## 2023-08-14 PROCEDURE — 99214 OFFICE O/P EST MOD 30 MIN: CPT | Performed by: NURSE PRACTITIONER

## 2023-08-14 NOTE — PROGRESS NOTES
[address], [facility]     Phone:  [phone]  Fax:  [phone]      Diane Franklin is a 53 y.o. female who presents today for her medical conditions/complaints as noted below. Diane Franklin is c/o of Cyst (Left hand)      Chief Complaint   Patient presents with    Cyst     Left hand       HPI:     HPI     Patient presents for left hand cyst that is located below left ring finger. Reports her life is going well. Depression is well controlled with paroxetine. Has been having tension headaches off and on for the last month. Has been taking excedrine migraine with relief.      Past Medical History:   Diagnosis Date    Anxiety     Asthma     Depression 01/13/2019    Hypothyroid 06/24/2013    Thyroid disorder     Yeast infection of nipple, postpartum 03/20/2014        Past Surgical History:   Procedure Laterality Date    COLONOSCOPY N/A 03/16/2023    Dr Yuli Boateng, 10 yr recall    HYSTERECTOMY (CERVIX STATUS UNKNOWN)      LAPAROSCOPY  06/11/2015    LIGAMENT REPAIR Right 12/29/2015    RIGHT ANKLE BROSTROM-LENTZ  performed by Isidro Hayes MD at [address] [place] [address] (CERVIX NOT REMOVED)  07/13/2015    Di Naveen    TUBAL LIGATION  03/2014    TUBAL LIGATION      US BREAST BIOPSY W LOC DEVICE 1ST LESION LEFT Left 04/06/2022    US BREAST NEEDLE BIOPSY LEFT 4/6/2022 [facility] SURGERY    WISDOM TOOTH EXTRACTION  2011       Social History     Tobacco Use    Smoking status: Never    Smokeless tobacco: Never   Substance Use Topics    Alcohol use: No        Current Outpatient Medications   Medication Sig Dispense Refill    PARoxetine (PAXIL CR) 25 MG extended release tablet TAKE 1 TABLET BY MOUTH ONCE DAILY 90 tablet 0    DULoxetine (CYMBALTA) 60 MG extended release capsule TAKE ONE CAPSULE ONE TIME DAILY 30 capsule 3    ondansetron (ZOFRAN-ODT) 8 MG TBDP disintegrating tablet Place 1 tablet under the tongue every 8 hours as needed for Nausea or Vomiting 20 tablet 3

## 2023-08-17 DIAGNOSIS — M62.838 MUSCLE SPASM: Primary | ICD-10-CM

## 2023-08-17 RX ORDER — CYCLOBENZAPRINE HCL 10 MG
10 TABLET ORAL 3 TIMES DAILY PRN
Qty: 30 TABLET | Refills: 0 | Status: SHIPPED | OUTPATIENT
Start: 2023-08-17 | End: 2023-08-27

## 2023-08-20 ASSESSMENT — ENCOUNTER SYMPTOMS
ABDOMINAL PAIN: 0
SHORTNESS OF BREATH: 0
SORE THROAT: 0
COLOR CHANGE: 0
DIARRHEA: 0
NAUSEA: 0
VOMITING: 0
CHEST TIGHTNESS: 0
COUGH: 0

## 2023-09-01 DIAGNOSIS — M79.7 FIBROMYALGIA: ICD-10-CM

## 2023-09-01 RX ORDER — DULOXETIN HYDROCHLORIDE 60 MG/1
CAPSULE, DELAYED RELEASE ORAL
Qty: 30 CAPSULE | Refills: 3 | Status: SHIPPED | OUTPATIENT
Start: 2023-09-01

## 2023-09-07 ENCOUNTER — PATIENT MESSAGE (OUTPATIENT)
Dept: PRIMARY CARE CLINIC | Age: 48
End: 2023-09-07

## 2023-11-10 RX ORDER — LORATADINE AND PSEUDOEPHEDRINE SULFATE 10; 240 MG/1; MG/1
1 TABLET, FILM COATED, EXTENDED RELEASE ORAL DAILY
Qty: 30 TABLET | Refills: 3 | Status: SHIPPED | OUTPATIENT
Start: 2023-11-10

## 2023-11-21 ENCOUNTER — OFFICE VISIT (OUTPATIENT)
Dept: PRIMARY CARE CLINIC | Age: 48
End: 2023-11-21
Payer: COMMERCIAL

## 2023-11-21 VITALS
HEIGHT: 65 IN | OXYGEN SATURATION: 96 % | DIASTOLIC BLOOD PRESSURE: 74 MMHG | TEMPERATURE: 97.6 F | BODY MASS INDEX: 28.66 KG/M2 | HEART RATE: 84 BPM | SYSTOLIC BLOOD PRESSURE: 116 MMHG | WEIGHT: 172 LBS

## 2023-11-21 DIAGNOSIS — M79.7 FIBROMYALGIA: ICD-10-CM

## 2023-11-21 DIAGNOSIS — F33.9 RECURRENT MAJOR DEPRESSIVE DISORDER, REMISSION STATUS UNSPECIFIED (HCC): Primary | ICD-10-CM

## 2023-11-21 DIAGNOSIS — G44.209 TENSION HEADACHE: ICD-10-CM

## 2023-11-21 PROCEDURE — 99214 OFFICE O/P EST MOD 30 MIN: CPT | Performed by: NURSE PRACTITIONER

## 2023-11-21 RX ORDER — DULOXETIN HYDROCHLORIDE 60 MG/1
60 CAPSULE, DELAYED RELEASE ORAL DAILY
Qty: 90 CAPSULE | Refills: 1 | Status: SHIPPED | OUTPATIENT
Start: 2023-11-21

## 2023-11-21 RX ORDER — SUMATRIPTAN 25 MG/1
25 TABLET, FILM COATED ORAL
Qty: 9 TABLET | Refills: 0 | Status: SHIPPED | OUTPATIENT
Start: 2023-11-21 | End: 2023-11-21

## 2023-11-21 RX ORDER — PAROXETINE HYDROCHLORIDE HEMIHYDRATE 25 MG/1
25 TABLET, FILM COATED, EXTENDED RELEASE ORAL DAILY
Qty: 90 TABLET | Refills: 1 | Status: SHIPPED | OUTPATIENT
Start: 2023-11-21

## 2023-11-21 NOTE — PROGRESS NOTES
Patient offered educational handouts and has had all questions answered. Patient voices understanding and agrees to plans along with risks and benefits of plan. Patient is instructed to continue prior meds, diet, and exercise plans as instructed. Patient agrees to follow up as instructed and sooner if needed. Patient agrees to go to ER if condition becomes emergent. EMR Dragon/transcription disclaimer: Some of this encounter note is an electronic transcription/translation of spoken language to printed text. The electronic translation of spoken language may permit erroneous, or at times, nonsensical words or phrases to be inadvertently transcribed.  Although I have reviewed the note for such errors, some may still exist.    Electronically signed by PELON Stinson CNP on 11/27/2023 at 10:01 PM

## 2023-11-27 ASSESSMENT — ENCOUNTER SYMPTOMS
SORE THROAT: 0
CHEST TIGHTNESS: 0
SHORTNESS OF BREATH: 0
DIARRHEA: 0
COLOR CHANGE: 0
VOMITING: 0
ABDOMINAL PAIN: 0
NAUSEA: 0
COUGH: 0

## 2024-02-06 ENCOUNTER — OFFICE VISIT (OUTPATIENT)
Dept: PRIMARY CARE CLINIC | Age: 49
End: 2024-02-06
Payer: COMMERCIAL

## 2024-02-06 VITALS
OXYGEN SATURATION: 98 % | TEMPERATURE: 97.3 F | WEIGHT: 182 LBS | SYSTOLIC BLOOD PRESSURE: 120 MMHG | BODY MASS INDEX: 30.29 KG/M2 | HEART RATE: 73 BPM | DIASTOLIC BLOOD PRESSURE: 76 MMHG

## 2024-02-06 DIAGNOSIS — G44.209 TENSION HEADACHE: ICD-10-CM

## 2024-02-06 DIAGNOSIS — E66.9 OBESITY (BMI 30.0-34.9): ICD-10-CM

## 2024-02-06 DIAGNOSIS — F33.9 RECURRENT MAJOR DEPRESSIVE DISORDER, REMISSION STATUS UNSPECIFIED (HCC): ICD-10-CM

## 2024-02-06 DIAGNOSIS — Z00.00 PHYSICAL EXAM: Primary | ICD-10-CM

## 2024-02-06 PROCEDURE — 99396 PREV VISIT EST AGE 40-64: CPT | Performed by: NURSE PRACTITIONER

## 2024-02-06 RX ORDER — RIZATRIPTAN BENZOATE 10 MG/1
10 TABLET ORAL
Qty: 9 TABLET | Refills: 0 | Status: SHIPPED | OUTPATIENT
Start: 2024-02-06 | End: 2024-02-06

## 2024-02-06 NOTE — PROGRESS NOTES
inadvertently transcribed. Although I have reviewed the note for such errors, some may still exist.    Electronically signed by PELON Yun CNP on 2/12/2024 at 9:29 PM

## 2024-02-16 ENCOUNTER — HOSPITAL ENCOUNTER (OUTPATIENT)
Dept: WOMENS IMAGING | Age: 49
Discharge: HOME | End: 2024-02-16
Payer: COMMERCIAL

## 2024-02-16 DIAGNOSIS — Z12.31 ENCOUNTER FOR SCREENING MAMMOGRAM FOR MALIGNANT NEOPLASM OF BREAST: ICD-10-CM

## 2024-02-16 PROCEDURE — 77063 BREAST TOMOSYNTHESIS BI: CPT

## 2024-03-01 ENCOUNTER — E-VISIT (OUTPATIENT)
Dept: PRIMARY CARE CLINIC | Age: 49
End: 2024-03-01
Payer: COMMERCIAL

## 2024-03-01 DIAGNOSIS — R05.1 ACUTE COUGH: Primary | ICD-10-CM

## 2024-03-01 PROCEDURE — 99213 OFFICE O/P EST LOW 20 MIN: CPT | Performed by: NURSE PRACTITIONER

## 2024-03-01 RX ORDER — BENZONATATE 200 MG/1
200 CAPSULE ORAL 3 TIMES DAILY PRN
Qty: 30 CAPSULE | Refills: 0 | Status: SHIPPED | OUTPATIENT
Start: 2024-03-01 | End: 2024-03-11

## 2024-03-01 ASSESSMENT — LIFESTYLE VARIABLES: SMOKING_STATUS: NO, I'VE NEVER SMOKED

## 2024-03-01 NOTE — PROGRESS NOTES
Christa Phillip, was evaluated through a synchronous (real-time) audio-video encounter. The patient (or guardian if applicable) is aware that this is a billable service, which includes applicable co-pays. This Virtual Visit was conducted with patient's (and/or legal guardian's) consent. Patient identification was verified, and a caregiver was present when appropriate.   The patient was located at Home: [address] [place] [address] [place] [address]  Provider was located at Facility (Appt Dept): [address] [place] [address] Drive  [address]  [place] [address]      Christa Phillip (:  1975) is a Established patient, presenting virtually for evaluation of the following: acute productive cough that has persisted despite mucinex, allegra, flonase, and nyquil.     Assessment & Plan   Below is the assessment and plan developed based on review of pertinent history, physical exam, labs, studies, and medications.  1. Acute cough  -     benzonatate (TESSALON) 200 MG capsule; Take 1 capsule by mouth 3 times daily as needed for Cough, Disp-30 capsule, R-0Normal    No follow-ups on file.       Subjective   HPI  Review of Systems       Objective   Patient-Reported Vitals  No data recorded     Physical Exam             --Jocelynn Turcios, APRN - CNP

## 2024-04-16 ENCOUNTER — OFFICE VISIT (OUTPATIENT)
Dept: PRIMARY CARE CLINIC | Age: 49
End: 2024-04-16
Payer: COMMERCIAL

## 2024-04-16 VITALS
DIASTOLIC BLOOD PRESSURE: 82 MMHG | SYSTOLIC BLOOD PRESSURE: 122 MMHG | TEMPERATURE: 97.6 F | BODY MASS INDEX: 30.89 KG/M2 | OXYGEN SATURATION: 98 % | WEIGHT: 185.4 LBS | HEART RATE: 84 BPM | HEIGHT: 65 IN

## 2024-04-16 DIAGNOSIS — M79.622 LEFT UPPER ARM PAIN: ICD-10-CM

## 2024-04-16 DIAGNOSIS — M25.512 ACUTE PAIN OF LEFT SHOULDER: Primary | ICD-10-CM

## 2024-04-16 PROCEDURE — 99214 OFFICE O/P EST MOD 30 MIN: CPT | Performed by: NURSE PRACTITIONER

## 2024-04-16 SDOH — ECONOMIC STABILITY: INCOME INSECURITY: HOW HARD IS IT FOR YOU TO PAY FOR THE VERY BASICS LIKE FOOD, HOUSING, MEDICAL CARE, AND HEATING?: NOT HARD AT ALL

## 2024-04-16 SDOH — ECONOMIC STABILITY: FOOD INSECURITY: WITHIN THE PAST 12 MONTHS, THE FOOD YOU BOUGHT JUST DIDN'T LAST AND YOU DIDN'T HAVE MONEY TO GET MORE.: NEVER TRUE

## 2024-04-16 SDOH — ECONOMIC STABILITY: FOOD INSECURITY: WITHIN THE PAST 12 MONTHS, YOU WORRIED THAT YOUR FOOD WOULD RUN OUT BEFORE YOU GOT MONEY TO BUY MORE.: NEVER TRUE

## 2024-04-16 ASSESSMENT — ENCOUNTER SYMPTOMS
COLOR CHANGE: 0
COUGH: 0
DIARRHEA: 0
CHEST TIGHTNESS: 0
NAUSEA: 0
VOMITING: 0
SORE THROAT: 0
SHORTNESS OF BREATH: 0
ABDOMINAL PAIN: 0

## 2024-04-16 NOTE — PROGRESS NOTES
pain of left shoulder  XR SHOULDER LEFT (MIN 2 VIEWS)      2. Left upper arm pain  XR SHOULDER LEFT (MIN 2 VIEWS)          No results found for this visit on 04/16/24.    Plan:     1. Acute pain of left shoulder  Do exercises twice daily. Handout provided in office today.  May try Voltaren gel, or ice to affected area as needed.    - XR SHOULDER LEFT (MIN 2 VIEWS); Future    2. Left upper arm pain  Do exercises twice daily. Handout provided in office today.  May try Voltaren gel, or ice to affected area as needed.    - XR SHOULDER LEFT (MIN 2 VIEWS); Future       Return in about 2 weeks (around 4/30/2024) for left shoulder pain.    Orders Placed This Encounter   Procedures    XR SHOULDER LEFT (MIN 2 VIEWS)     Standing Status:   Future     Standing Expiration Date:   4/16/2025     Order Specific Question:   Reason for exam:     Answer:   left shoulder pain       No orders of the defined types were placed in this encounter.           Patient offered educational handouts and has had all questions answered.  Patient voices understanding and agrees to plans along with risks and benefits of plan. Patient is instructed to continue prior meds, diet, and exercise plans as instructed. Patient agrees to follow up as instructed and sooner if needed.  Patient agrees to go to ER if condition becomes emergent.      EMR Dragon/transcription disclaimer: Some of this encounter note is an electronic transcription/translation of spoken language to printed text. The electronic translation of spoken language may permit erroneous, or at times, nonsensical words or phrases to be inadvertently transcribed. Although I have reviewed the note for such errors, some may still exist.    Electronically signed by PELON Yun CNP on 4/16/2024 at 4:02 PM

## 2024-04-30 ENCOUNTER — TELEPHONE (OUTPATIENT)
Dept: PRIMARY CARE CLINIC | Age: 49
End: 2024-04-30

## 2024-05-06 ENCOUNTER — TELEPHONE (OUTPATIENT)
Dept: PRIMARY CARE CLINIC | Age: 49
End: 2024-05-06

## 2024-07-08 DIAGNOSIS — F33.9 RECURRENT MAJOR DEPRESSIVE DISORDER, REMISSION STATUS UNSPECIFIED (HCC): ICD-10-CM

## 2024-07-08 RX ORDER — PAROXETINE HYDROCHLORIDE HEMIHYDRATE 25 MG/1
25 TABLET, FILM COATED, EXTENDED RELEASE ORAL DAILY
Qty: 14 TABLET | Refills: 0 | Status: SHIPPED | OUTPATIENT
Start: 2024-07-08

## 2024-07-26 ENCOUNTER — OFFICE VISIT (OUTPATIENT)
Dept: PRIMARY CARE CLINIC | Age: 49
End: 2024-07-26
Payer: COMMERCIAL

## 2024-07-26 VITALS
HEIGHT: 65 IN | TEMPERATURE: 98 F | DIASTOLIC BLOOD PRESSURE: 76 MMHG | WEIGHT: 189.2 LBS | HEART RATE: 74 BPM | SYSTOLIC BLOOD PRESSURE: 124 MMHG | OXYGEN SATURATION: 98 % | BODY MASS INDEX: 31.52 KG/M2

## 2024-07-26 DIAGNOSIS — M79.7 FIBROMYALGIA: ICD-10-CM

## 2024-07-26 DIAGNOSIS — R53.83 FATIGUE, UNSPECIFIED TYPE: ICD-10-CM

## 2024-07-26 DIAGNOSIS — F33.9 RECURRENT MAJOR DEPRESSIVE DISORDER, REMISSION STATUS UNSPECIFIED (HCC): ICD-10-CM

## 2024-07-26 DIAGNOSIS — M25.50 ARTHRALGIA OF MULTIPLE JOINTS: Primary | ICD-10-CM

## 2024-07-26 DIAGNOSIS — M79.622 LEFT UPPER ARM PAIN: ICD-10-CM

## 2024-07-26 DIAGNOSIS — M25.512 ACUTE PAIN OF LEFT SHOULDER: ICD-10-CM

## 2024-07-26 DIAGNOSIS — M79.10 MYALGIA: ICD-10-CM

## 2024-07-26 DIAGNOSIS — Z13.220 SCREENING FOR HYPERLIPIDEMIA: ICD-10-CM

## 2024-07-26 PROCEDURE — 99214 OFFICE O/P EST MOD 30 MIN: CPT | Performed by: NURSE PRACTITIONER

## 2024-07-26 RX ORDER — DULOXETIN HYDROCHLORIDE 60 MG/1
60 CAPSULE, DELAYED RELEASE ORAL DAILY
Qty: 90 CAPSULE | Refills: 0 | Status: SHIPPED | OUTPATIENT
Start: 2024-07-26

## 2024-07-26 RX ORDER — PAROXETINE HYDROCHLORIDE HEMIHYDRATE 25 MG/1
25 TABLET, FILM COATED, EXTENDED RELEASE ORAL DAILY
Qty: 90 TABLET | Refills: 0 | Status: SHIPPED | OUTPATIENT
Start: 2024-07-26

## 2024-07-26 NOTE — PROGRESS NOTES
or phrases to be inadvertently transcribed. Although I have reviewed the note for such errors, some may still exist.    Electronically signed by PELON Yun CNP on 7/31/2024 at 8:23 PM

## 2024-07-29 RX ORDER — DULOXETIN HYDROCHLORIDE 60 MG/1
60 CAPSULE, DELAYED RELEASE ORAL DAILY
Qty: 90 CAPSULE | Refills: 1 | OUTPATIENT
Start: 2024-07-29

## 2024-07-31 ASSESSMENT — ENCOUNTER SYMPTOMS
VOMITING: 0
SHORTNESS OF BREATH: 0
COLOR CHANGE: 0
ABDOMINAL PAIN: 0
CHEST TIGHTNESS: 0
NAUSEA: 0
COUGH: 0
SORE THROAT: 0
DIARRHEA: 0

## 2024-08-01 DIAGNOSIS — Z13.220 SCREENING FOR HYPERLIPIDEMIA: ICD-10-CM

## 2024-08-01 DIAGNOSIS — R53.83 FATIGUE, UNSPECIFIED TYPE: ICD-10-CM

## 2024-08-01 DIAGNOSIS — M79.10 MYALGIA: ICD-10-CM

## 2024-08-01 DIAGNOSIS — M25.50 ARTHRALGIA OF MULTIPLE JOINTS: ICD-10-CM

## 2024-08-01 LAB
ALBUMIN SERPL-MCNC: 3.9 G/DL (ref 3.5–5.2)
ALP SERPL-CCNC: 84 U/L (ref 35–104)
ALT SERPL-CCNC: 24 U/L (ref 5–33)
ANION GAP SERPL CALCULATED.3IONS-SCNC: 9 MMOL/L (ref 7–19)
AST SERPL-CCNC: 22 U/L (ref 5–32)
BASOPHILS # BLD: 0.1 K/UL (ref 0–0.2)
BASOPHILS NFR BLD: 1.1 % (ref 0–1)
BILIRUB SERPL-MCNC: 0.3 MG/DL (ref 0.2–1.2)
BUN SERPL-MCNC: 12 MG/DL (ref 6–20)
CALCIUM SERPL-MCNC: 8.7 MG/DL (ref 8.6–10)
CHLORIDE SERPL-SCNC: 107 MMOL/L (ref 98–111)
CHOLEST SERPL-MCNC: 204 MG/DL (ref 160–199)
CO2 SERPL-SCNC: 24 MMOL/L (ref 22–29)
CREAT SERPL-MCNC: 0.6 MG/DL (ref 0.5–0.9)
CRP SERPL HS-MCNC: 0.34 MG/DL (ref 0–0.5)
EOSINOPHIL # BLD: 0.4 K/UL (ref 0–0.6)
EOSINOPHIL NFR BLD: 6.3 % (ref 0–5)
ERYTHROCYTE [DISTWIDTH] IN BLOOD BY AUTOMATED COUNT: 12.4 % (ref 11.5–14.5)
ERYTHROCYTE [SEDIMENTATION RATE] IN BLOOD BY WESTERGREN METHOD: 7 MM/HR (ref 0–20)
GLUCOSE SERPL-MCNC: 94 MG/DL (ref 74–109)
HCT VFR BLD AUTO: 41 % (ref 37–47)
HCV AB SERPL QL IA: NORMAL
HDLC SERPL-MCNC: 42 MG/DL (ref 65–121)
HGB BLD-MCNC: 13.1 G/DL (ref 12–16)
HIV-1 P24 AG: NORMAL
HIV1+2 AB SERPLBLD QL IA.RAPID: NORMAL
IMM GRANULOCYTES # BLD: 0 K/UL
LDLC SERPL CALC-MCNC: 117 MG/DL
LYMPHOCYTES # BLD: 1.9 K/UL (ref 1.1–4.5)
LYMPHOCYTES NFR BLD: 29 % (ref 20–40)
MCH RBC QN AUTO: 30.2 PG (ref 27–31)
MCHC RBC AUTO-ENTMCNC: 32 G/DL (ref 33–37)
MCV RBC AUTO: 94.5 FL (ref 81–99)
MONOCYTES # BLD: 0.6 K/UL (ref 0–0.9)
MONOCYTES NFR BLD: 9.9 % (ref 0–10)
NEUTROPHILS # BLD: 3.5 K/UL (ref 1.5–7.5)
NEUTS SEG NFR BLD: 53.1 % (ref 50–65)
PLATELET # BLD AUTO: 329 K/UL (ref 130–400)
PMV BLD AUTO: 9.4 FL (ref 9.4–12.3)
POTASSIUM SERPL-SCNC: 4.5 MMOL/L (ref 3.5–5)
PROT SERPL-MCNC: 6.6 G/DL (ref 6.6–8.7)
RBC # BLD AUTO: 4.34 M/UL (ref 4.2–5.4)
SODIUM SERPL-SCNC: 140 MMOL/L (ref 136–145)
T4 FREE SERPL-MCNC: 0.8 NG/DL (ref 0.93–1.7)
TRIGL SERPL-MCNC: 227 MG/DL (ref 0–149)
TSH SERPL DL<=0.005 MIU/L-ACNC: 1.39 UIU/ML (ref 0.27–4.2)
VIT B12 SERPL-MCNC: 687 PG/ML (ref 211–946)
WBC # BLD AUTO: 6.5 K/UL (ref 4.8–10.8)

## 2024-08-02 LAB
B BURGDOR IGG SER QL IB: NEGATIVE
B BURGDOR IGM SER QL IB: NEGATIVE

## 2024-08-03 LAB — NUCLEAR IGG SER QL IA: DETECTED

## 2024-08-05 LAB
ANA PATTERN: ABNORMAL
ANA TITER: ABNORMAL
ANTINUCLEAR AB INTERPRETIVE COMMENT: ABNORMAL
DSDNA AB TITR SER CLIF: 5 IU (ref 0–24)
ENA RNP IGG SER IA-ACNC: 6 UNITS (ref 0–19)
NUCLEAR IGG SER QL IF: DETECTED

## 2024-08-06 DIAGNOSIS — R53.83 FATIGUE, UNSPECIFIED TYPE: ICD-10-CM

## 2024-08-06 DIAGNOSIS — M79.622 LEFT UPPER ARM PAIN: ICD-10-CM

## 2024-08-06 DIAGNOSIS — M25.50 ARTHRALGIA OF MULTIPLE JOINTS: ICD-10-CM

## 2024-08-06 DIAGNOSIS — M79.10 MYALGIA: ICD-10-CM

## 2024-08-06 DIAGNOSIS — R76.8 POSITIVE ANA (ANTINUCLEAR ANTIBODY): Primary | ICD-10-CM

## 2024-08-06 LAB
ENA JO1 AB TITR SER: 0 AU/ML (ref 0–40)
ENA SCL70 IGG SER QL: 0 AU/ML (ref 0–40)
ENA SM IGG SER-ACNC: 1 AU/ML (ref 0–40)
ENA SS-A 60KD AB SER-ACNC: 3 AU/ML (ref 0–40)
ENA SS-A IGG SER IA-ACNC: 3 AU/ML (ref 0–40)
ENA SS-B IGG SER IA-ACNC: 0 AU/ML (ref 0–40)

## 2024-08-07 ENCOUNTER — PATIENT MESSAGE (OUTPATIENT)
Dept: PRIMARY CARE CLINIC | Age: 49
End: 2024-08-07

## 2024-08-15 ENCOUNTER — OFFICE VISIT (OUTPATIENT)
Dept: OBGYN CLINIC | Age: 49
End: 2024-08-15
Payer: COMMERCIAL

## 2024-08-15 VITALS
HEIGHT: 65 IN | DIASTOLIC BLOOD PRESSURE: 80 MMHG | BODY MASS INDEX: 31.16 KG/M2 | SYSTOLIC BLOOD PRESSURE: 127 MMHG | WEIGHT: 187 LBS | HEART RATE: 90 BPM

## 2024-08-15 DIAGNOSIS — R23.2 HOT FLASHES: ICD-10-CM

## 2024-08-15 DIAGNOSIS — N90.89 SKIN TAG OF LABIA: ICD-10-CM

## 2024-08-15 DIAGNOSIS — M25.50 PAIN IN JOINT, MULTIPLE SITES: ICD-10-CM

## 2024-08-15 DIAGNOSIS — N95.1 PERIMENOPAUSAL SYMPTOMS: Primary | ICD-10-CM

## 2024-08-15 LAB — FSH SERPL-SCNC: 4 MIU/ML

## 2024-08-15 PROCEDURE — 99203 OFFICE O/P NEW LOW 30 MIN: CPT | Performed by: OBSTETRICS & GYNECOLOGY

## 2024-08-15 PROCEDURE — 56605 BIOPSY OF VULVA/PERINEUM: CPT | Performed by: OBSTETRICS & GYNECOLOGY

## 2024-08-15 NOTE — PROGRESS NOTES
Christa Phillip is a 49 y.o. who presents today with c/o menopausal symptoms and vaginal skin tags.  She notes heat intolerance, frequent flashes.  S/p hysterectomy years ago for heavy periods.  She has worsening joint pain at multiple sites.  She has had work-up by PCP and is pending Rheumatology consultation.      Patient made aware of possible exposure to STI's over the past year.        Review of Systems   Constitutional:  Positive for diaphoresis and fatigue.   HENT: Negative.     Eyes: Negative.    Respiratory: Negative.     Cardiovascular: Negative.    Gastrointestinal: Negative.    Endocrine: Positive for heat intolerance.   Genitourinary:  Positive for genital sores.   Musculoskeletal:  Positive for joint swelling.   Skin: Negative.    Allergic/Immunologic: Negative.    Neurological: Negative.    Hematological: Negative.    Psychiatric/Behavioral:  Positive for sleep disturbance.        Past Medical History:   Diagnosis Date    Anxiety     Asthma     Depression 01/13/2019    Hypothyroid 06/24/2013    Thyroid disorder     Yeast infection of nipple, postpartum 03/20/2014       Past Surgical History:   Procedure Laterality Date    COLONOSCOPY N/A 03/16/2023    Dr SMITA Oviedo-Normal, 10 yr recall    HYSTERECTOMY (CERVIX STATUS UNKNOWN)      LAPAROSCOPY  06/11/2015    LIGAMENT REPAIR Right 12/29/2015    RIGHT ANKLE BROSTROM-LENTZ  performed by Severo Varghese MD at [facility] OR    PARTIAL HYSTERECTOMY (CERVIX NOT REMOVED)  07/13/2015    Di Naveen    TUBAL LIGATION  03/2014    TUBAL LIGATION      US BREAST BIOPSY W LOC DEVICE 1ST LESION LEFT Left 04/06/2022    US BREAST NEEDLE BIOPSY LEFT 4/6/2022 [facility] SURGERY    WISDOM TOOTH EXTRACTION  2011       Family History   Problem Relation Age of Onset    Heart Disease Mother     Diabetes Mother     Cancer Father     High Cholesterol Father        Social History     Socioeconomic History    Marital status: Legally      Spouse name: Not on file    Number of  release capsule, TAKE ONE CAPSULE BY MOUTH ONCE A DAY, Disp: 90 capsule, Rfl: 0    PARoxetine (PAXIL CR) 25 MG extended release tablet, Take 1 tablet by mouth daily NO FURTHER REFILLS UNTIL SEEN IN OFFICE., Disp: 90 tablet, Rfl: 0    VITAMIN D, ERGOCALCIFEROL, PO, Take 5,000 mg by mouth daily, Disp: , Rfl:     loratadine-pseudoephedrine (ALLERGY RELIEF D-24)  MG per extended release tablet, Take 1 tablet by mouth daily (Patient taking differently: Take 1 tablet by mouth daily prn), Disp: 30 tablet, Rfl: 3    famotidine (PEPCID) 20 MG tablet, Take 1 tablet by mouth 2 times daily (Patient taking differently: Take 1 tablet by mouth 2 times daily Taking daily), Disp: 60 tablet, Rfl: 5    albuterol sulfate HFA (PROVENTIL HFA) 108 (90 Base) MCG/ACT inhaler, Inhale 2 puffs into the lungs every 6 hours as needed for Wheezing, Disp: 18 g, Rfl: 3    azelastine (ASTELIN) 0.1 % nasal spray, 1 spray by Nasal route 2 times daily Use in each nostril as directed, Disp: 2 Bottle, Rfl: 1    fluticasone (FLONASE) 50 MCG/ACT nasal spray, 2 sprays by Nasal route daily, Disp: 1 Bottle, Rfl: 0    Omega-3 Fatty Acids (FISH OIL) 1000 MG CPDR, Take 3 capsules by mouth When patient remembers, Disp: , Rfl:     Cetirizine HCl 10 MG CAPS, Take 10 mg by mouth 2 times daily as needed Patient reports taking daily, Disp: , Rfl:     Allergies   Allergen Reactions    Adhesive Tape      blisters    Molds & Smuts Dermatitis, Hives, Itching, Rash, Shortness Of Breath and Swelling    Pollen Extract Dermatitis, Hives, Itching, Rash, Shortness Of Breath and Swelling    Wheat      Makes sick at stomach      Wasp Venom Hives, Itching, Nausea And Vomiting and Swelling       /80 (Site: Left Upper Arm, Position: Sitting, Cuff Size: Medium Adult)   Pulse 90   Ht 1.651 m (5' 5\")   Wt 84.8 kg (187 lb)   LMP 05/12/2015 (Approximate)   BMI 31.12 kg/m²   Physical Exam  Constitutional:       General: She is not in acute distress.     Appearance: She is

## 2024-08-23 ENCOUNTER — TELEPHONE (OUTPATIENT)
Dept: OBGYN CLINIC | Age: 49
End: 2024-08-23

## 2024-08-23 NOTE — TELEPHONE ENCOUNTER
Contacted patient to go over lab and pathology results.   FSH WNL, surgical pathology shows papilloma/HPV.     Pt verbalized understanding will call when she is able to RTO for treatment of HPV areas.

## 2024-08-28 ENCOUNTER — OFFICE VISIT (OUTPATIENT)
Dept: OBGYN CLINIC | Age: 49
End: 2024-08-28
Payer: COMMERCIAL

## 2024-08-28 VITALS
HEIGHT: 65 IN | BODY MASS INDEX: 31.27 KG/M2 | SYSTOLIC BLOOD PRESSURE: 125 MMHG | HEART RATE: 88 BPM | WEIGHT: 187.7 LBS | DIASTOLIC BLOOD PRESSURE: 85 MMHG

## 2024-08-28 DIAGNOSIS — A63.0 GENITAL WARTS: Primary | ICD-10-CM

## 2024-08-28 PROCEDURE — 17110 DESTRUCTION B9 LES UP TO 14: CPT | Performed by: NURSE PRACTITIONER

## 2024-08-28 ASSESSMENT — ENCOUNTER SYMPTOMS
RESPIRATORY NEGATIVE: 1
ALLERGIC/IMMUNOLOGIC NEGATIVE: 1
GASTROINTESTINAL NEGATIVE: 1
EYES NEGATIVE: 1

## 2024-08-28 NOTE — PROGRESS NOTES
Christa Phillip is a 49 y.o. female who presents today for her medical conditions/ complaints as noted below. Christa Phillip is c/o of Follow-up        HPI  Pt here for warts. New dx from past visit where bx showed hpv. Wants to see how to treat the others.   Patient's last menstrual period was 2015 (approximate).      Past Medical History:   Diagnosis Date    Anxiety     Asthma     Depression 2019    Hypothyroid 2013    Thyroid disorder     Yeast infection of nipple, postpartum 2014     Past Surgical History:   Procedure Laterality Date    COLONOSCOPY N/A 2023    Dr SMITA Oviedo-Edgar, 10 yr recall    HYSTERECTOMY (CERVIX STATUS UNKNOWN)      LAPAROSCOPY  2015    LIGAMENT REPAIR Right 2015    RIGHT ANKLE BROSTROM-LENTZ  performed by Severo Varghese MD at [facility] OR    PARTIAL HYSTERECTOMY (CERVIX NOT REMOVED)  2015    Di Naveen    TUBAL LIGATION  2014    TUBAL LIGATION      US BREAST BIOPSY W LOC DEVICE 1ST LESION LEFT Left 2022    US BREAST NEEDLE BIOPSY LEFT 2022 [facility]    WISDOM TOOTH EXTRACTION       Family History   Problem Relation Age of Onset    Heart Disease Mother     Diabetes Mother     Cancer Father     High Cholesterol Father      Social History     Tobacco Use    Smoking status: Never    Smokeless tobacco: Never   Substance Use Topics    Alcohol use: No       Current Outpatient Medications   Medication Sig Dispense Refill    DULoxetine (CYMBALTA) 60 MG extended release capsule TAKE ONE CAPSULE BY MOUTH ONCE A DAY 90 capsule 0    VITAMIN D, ERGOCALCIFEROL, PO Take 5,000 mg by mouth daily      loratadine-pseudoephedrine (ALLERGY RELIEF D-24)  MG per extended release tablet Take 1 tablet by mouth daily (Patient taking differently: Take 1 tablet by mouth daily prn) 30 tablet 3    famotidine (PEPCID) 20 MG tablet Take 1 tablet by mouth 2 times daily (Patient taking differently: Take 1 tablet by mouth 2 times daily Taking

## 2024-11-11 ENCOUNTER — TELEPHONE (OUTPATIENT)
Dept: OBGYN CLINIC | Age: 49
End: 2024-11-11

## 2024-11-12 ENCOUNTER — OFFICE VISIT (OUTPATIENT)
Dept: OBGYN CLINIC | Age: 49
End: 2024-11-12
Payer: COMMERCIAL

## 2024-11-12 VITALS
WEIGHT: 184.7 LBS | BODY MASS INDEX: 30.77 KG/M2 | HEIGHT: 65 IN | HEART RATE: 93 BPM | SYSTOLIC BLOOD PRESSURE: 130 MMHG | DIASTOLIC BLOOD PRESSURE: 84 MMHG

## 2024-11-12 DIAGNOSIS — Z86.19 HISTORY OF GENITAL WARTS: ICD-10-CM

## 2024-11-12 DIAGNOSIS — Z71.89 COUNSELING FOR HORMONE REPLACEMENT THERAPY: ICD-10-CM

## 2024-11-12 DIAGNOSIS — G47.9 SLEEP DISTURBANCE: ICD-10-CM

## 2024-11-12 DIAGNOSIS — N90.5 VULVAR ATROPHY: ICD-10-CM

## 2024-11-12 DIAGNOSIS — R23.2 HOT FLASHES: Primary | ICD-10-CM

## 2024-11-12 DIAGNOSIS — R23.2 HOT FLASHES: ICD-10-CM

## 2024-11-12 PROCEDURE — 99214 OFFICE O/P EST MOD 30 MIN: CPT | Performed by: NURSE PRACTITIONER

## 2024-11-12 RX ORDER — ESTRADIOL 0.5 MG/.5G
0.5 GEL TOPICAL DAILY
Qty: 30 EACH | Refills: 1 | Status: SHIPPED | OUTPATIENT
Start: 2024-11-12 | End: 2024-11-12 | Stop reason: SDUPTHER

## 2024-11-12 RX ORDER — ESTRADIOL 0.5 MG/.5G
0.5 GEL TOPICAL DAILY
Qty: 30 EACH | Refills: 1 | Status: SHIPPED | OUTPATIENT
Start: 2024-11-12

## 2024-11-12 ASSESSMENT — ENCOUNTER SYMPTOMS
ALLERGIC/IMMUNOLOGIC NEGATIVE: 1
GASTROINTESTINAL NEGATIVE: 1
EYES NEGATIVE: 1
RESPIRATORY NEGATIVE: 1

## 2024-11-12 NOTE — PROGRESS NOTES
Christa Phillip is a 49 y.o. female who presents today for her medical conditions/ complaints as noted below. Christa Phillip is c/o of Genital Warts and Hormone Issues        HPI  Pt presents today wanting to make sure she doesn't have any genital warts at this time. Pt would also like to discuss hormones due to having hot flashes.  Has had headache past few days as well. Wondering if her bp.   Not sleeping well, has nightmares since her breakup  Feels really dry in perineal area.    Patient's last menstrual period was 2015 (approximate).      Past Medical History:   Diagnosis Date    Anxiety     Asthma     Depression 2019    Hypothyroid 2013    Thyroid disorder     Yeast infection of nipple, postpartum 2014     Past Surgical History:   Procedure Laterality Date    COLONOSCOPY N/A 2023    Dr SMITA Oviedo-Edgar, 10 yr recall    HYSTERECTOMY (CERVIX STATUS UNKNOWN)      LAPAROSCOPY  2015    LIGAMENT REPAIR Right 2015    RIGHT ANKLE BROSTROM-LENTZ  performed by Severo [facility]arg[facility]se MD at [facility] OR    PARTIAL HYSTERECTOMY (CERVIX NOT REMOVED)  2015    Di Naveen    TUBAL LIGATION  2014    TUBAL LIGATION      US BREAST BIOPSY W LOC DEVICE 1ST LESION LEFT Left 2022    US BREAST NEEDLE BIOPSY LEFT 2022 [facility]    WISDOM TOOTH EXTRACTION       Family History   Problem Relation Age of Onset    Heart Disease Mother     Diabetes Mother     Cancer Father     High Cholesterol Father      Social History     Tobacco Use    Smoking status: Never    Smokeless tobacco: Never   Substance Use Topics    Alcohol use: No       Current Outpatient Medications   Medication Sig Dispense Refill    DIVIGEL 0.5 MG/0.5GM GEL Place 0.5 mg onto the skin daily 30 each 1    DULoxetine (CYMBALTA) 60 MG extended release capsule TAKE ONE CAPSULE BY MOUTH ONCE A DAY 90 capsule 0    PARoxetine (PAXIL CR) 25 MG extended release tablet Take 1 tablet by mouth daily NO FURTHER REFILLS

## 2024-11-26 ENCOUNTER — OFFICE VISIT (OUTPATIENT)
Dept: PRIMARY CARE CLINIC | Age: 49
End: 2024-11-26

## 2024-11-26 VITALS
DIASTOLIC BLOOD PRESSURE: 72 MMHG | BODY MASS INDEX: 31.89 KG/M2 | TEMPERATURE: 96.9 F | WEIGHT: 191.4 LBS | SYSTOLIC BLOOD PRESSURE: 128 MMHG | HEART RATE: 97 BPM | OXYGEN SATURATION: 99 % | HEIGHT: 65 IN

## 2024-11-26 DIAGNOSIS — M25.512 ACUTE PAIN OF LEFT SHOULDER: Primary | ICD-10-CM

## 2024-11-26 DIAGNOSIS — M79.7 FIBROMYALGIA: ICD-10-CM

## 2024-11-26 DIAGNOSIS — F33.9 RECURRENT MAJOR DEPRESSIVE DISORDER, REMISSION STATUS UNSPECIFIED (HCC): ICD-10-CM

## 2024-11-26 RX ORDER — PAROXETINE HYDROCHLORIDE HEMIHYDRATE 25 MG/1
25 TABLET, FILM COATED, EXTENDED RELEASE ORAL DAILY
Qty: 90 TABLET | Refills: 0 | Status: SHIPPED | OUTPATIENT
Start: 2024-11-26

## 2024-11-26 RX ORDER — DULOXETIN HYDROCHLORIDE 60 MG/1
60 CAPSULE, DELAYED RELEASE ORAL DAILY
Qty: 90 CAPSULE | Refills: 0 | Status: SHIPPED | OUTPATIENT
Start: 2024-11-26

## 2024-11-26 ASSESSMENT — ENCOUNTER SYMPTOMS
SORE THROAT: 0
COUGH: 0
VOMITING: 0
ABDOMINAL PAIN: 0
NAUSEA: 0
DIARRHEA: 0
COLOR CHANGE: 0
SHORTNESS OF BREATH: 0
CHEST TIGHTNESS: 0

## 2024-11-26 NOTE — PROGRESS NOTES
[address] [place] [address] Drive   [address] [facility], [address]     Phone:  [phone]  Fax:  [phone]      Christa Phillip is a 49 y.o. female who presents today for her medical conditions/complaints as noted below.  Christa Phillip is c/o of Shoulder Pain      Chief Complaint   Patient presents with    Shoulder Pain       HPI:     Patient presents in office with continued left shoulder pain. Was seen a few months ago and has tried to rest her left shoulder without relief. Is able to move her arm, but it hurts with any movements,  \"clicks\" and pain is worse with abduction. Has taken naproxen for pain without complete relief. Has used diclofenac, methol patch, biofreeze without relief. Has used ice without relief. Has numbness and tingling in left hand. No know injury.  Numbness and tingling is intermittent. Fibromyalgia is stable. Depression is stable.     Past Medical History:   Diagnosis Date    Anxiety     Asthma     Depression 01/13/2019    Hypothyroid 06/24/2013    Thyroid disorder     Yeast infection of nipple, postpartum 03/20/2014        Past Surgical History:   Procedure Laterality Date    COLONOSCOPY N/A 03/16/2023    Dr SMITA Oviedo-Edgar, 10 yr recall    HYSTERECTOMY (CERVIX STATUS UNKNOWN)      LAPAROSCOPY  06/11/2015    LIGAMENT REPAIR Right 12/29/2015    RIGHT ANKLE BROSTROM-LENTZ  performed by Severo Varghese MD at [facility] OR    PARTIAL HYSTERECTOMY (CERVIX NOT REMOVED)  07/13/2015    Di Naveen    TUBAL LIGATION  03/2014    TUBAL LIGATION      US BREAST BIOPSY W LOC DEVICE 1ST LESION LEFT Left 04/06/2022    US BREAST NEEDLE BIOPSY LEFT 4/6/2022 [facility]ERY    WISDOM TOOTH EXTRACTION  2011       Social History     Tobacco Use    Smoking status: Never    Smokeless tobacco: Never   Substance Use Topics    Alcohol use: No        Current Outpatient Medications   Medication Sig Dispense Refill    DULoxetine (CYMBALTA) 60 MG extended release capsule Take 1 capsule by mouth daily 90 capsule 0

## 2024-12-26 ENCOUNTER — HOSPITAL ENCOUNTER (OUTPATIENT)
Dept: MRI IMAGING | Age: 49
Discharge: HOME | End: 2024-12-26
Payer: COMMERCIAL

## 2024-12-26 DIAGNOSIS — M25.512 ACUTE PAIN OF LEFT SHOULDER: ICD-10-CM

## 2024-12-26 DIAGNOSIS — M67.819 TENDINOSIS OF ROTATOR CUFF: ICD-10-CM

## 2024-12-26 DIAGNOSIS — M25.512 CHRONIC LEFT SHOULDER PAIN: Primary | ICD-10-CM

## 2024-12-26 DIAGNOSIS — M75.102 TEAR OF LEFT SUPRASPINATUS TENDON: ICD-10-CM

## 2024-12-26 DIAGNOSIS — S46.812A PARTIAL TEAR OF LEFT SUBSCAPULARIS TENDON, INITIAL ENCOUNTER: ICD-10-CM

## 2024-12-26 DIAGNOSIS — G89.29 CHRONIC LEFT SHOULDER PAIN: Primary | ICD-10-CM

## 2024-12-26 PROCEDURE — 73221 MRI JOINT UPR EXTREM W/O DYE: CPT

## 2025-01-03 ENCOUNTER — OFFICE VISIT (OUTPATIENT)
Age: 50
End: 2025-01-03
Payer: COMMERCIAL

## 2025-01-03 VITALS — HEIGHT: 65 IN | WEIGHT: 180 LBS | BODY MASS INDEX: 29.99 KG/M2

## 2025-01-03 DIAGNOSIS — M75.02 ADHESIVE CAPSULITIS OF LEFT SHOULDER: Primary | ICD-10-CM

## 2025-01-03 PROCEDURE — 99214 OFFICE O/P EST MOD 30 MIN: CPT | Performed by: ORTHOPAEDIC SURGERY

## 2025-01-03 NOTE — PROGRESS NOTES
Orthopaedic History and Physical - New Patient    NAME:  Christa Phillip   : 1975  MRN: [id number]      1/3/2025     CHIEF COMPLAINT:  had concerns including new to provider.     HISTORY OF PRESENT ILLNESS:   49-year-old female presents today for her left shoulder.  She is having significant pain and stiffness.  She does have an MRI demonstrating adhesive capsulitis with thickening inferior capsule.  She has been in physical therapy but she states she is struggling to progress due to significant pain.    Past Medical History:        Diagnosis Date    Anxiety     Asthma     Depression 2019    Hypothyroid 2013    Thyroid disorder     Yeast infection of nipple, postpartum 2014       Past Surgical History:        Procedure Laterality Date    COLONOSCOPY N/A 2023    Dr SMITA Oviedo-Normal, 10 yr recall    HYSTERECTOMY (CERVIX STATUS UNKNOWN)      LAPAROSCOPY  2015    LIGAMENT REPAIR Right 2015    RIGHT ANKLE BROSTROM-LENTZ  performed by Severo Varghese MD at [facility] OR    PARTIAL HYSTERECTOMY (CERVIX NOT REMOVED)  2015    [facility]    TUBAL LIGATION  2014    TUBAL LIGATION      US BREAST BIOPSY W LOC DEVICE 1ST LESION LEFT Left 2022    US BREAST NEEDLE BIOPSY LEFT 2022 [facility]GERY    WISDOM TOOTH EXTRACTION         Current Medications:   Prior to Admission medications    Medication Sig Start Date End Date Taking? Authorizing Provider   DULoxetine (CYMBALTA) 60 MG extended release capsule Take 1 capsule by mouth daily 24  Yes Jocelynn Turcios APRN - CNP   PARoxetine (PAXIL CR) 25 MG extended release tablet Take 1 tablet by mouth daily 24  Yes Jocelynn Turcios APRN - CNP   Estradiol (DIVIGEL) 0.5 MG/0.5GM GEL Place 0.5 mg onto the skin daily 24  Yes Krystin Ramirez APRN   VITAMIN D, ERGOCALCIFEROL, PO Take 5,000 mg by mouth daily   Yes Provider, MD Mayra   loratadine-pseudoephedrine (ALLERGY RELIEF -)  MG per extended release tablet

## 2025-01-08 ENCOUNTER — PREP FOR PROCEDURE (OUTPATIENT)
Age: 50
End: 2025-01-08

## 2025-01-08 ENCOUNTER — HOSPITAL ENCOUNTER (OUTPATIENT)
Dept: PREADMISSION TESTING | Age: 50
Discharge: HOME | End: 2025-01-12

## 2025-01-08 DIAGNOSIS — M75.02 ADHESIVE CAPSULITIS OF LEFT SHOULDER: ICD-10-CM

## 2025-01-08 RX ORDER — CELECOXIB 100 MG/1
100 CAPSULE ORAL DAILY
COMMUNITY

## 2025-01-10 ENCOUNTER — ANESTHESIA EVENT (OUTPATIENT)
Dept: OPERATING ROOM | Age: 50
End: 2025-01-10
Payer: COMMERCIAL

## 2025-01-13 ENCOUNTER — HOSPITAL ENCOUNTER (OUTPATIENT)
Age: 50
Setting detail: OUTPATIENT SURGERY
Discharge: HOME | End: 2025-01-13
Attending: ORTHOPAEDIC SURGERY | Admitting: ORTHOPAEDIC SURGERY
Payer: COMMERCIAL

## 2025-01-13 ENCOUNTER — ANESTHESIA (OUTPATIENT)
Dept: OPERATING ROOM | Age: 50
End: 2025-01-13
Payer: COMMERCIAL

## 2025-01-13 VITALS
TEMPERATURE: 97.5 F | RESPIRATION RATE: 18 BRPM | HEART RATE: 76 BPM | WEIGHT: 185 LBS | SYSTOLIC BLOOD PRESSURE: 133 MMHG | BODY MASS INDEX: 30.82 KG/M2 | DIASTOLIC BLOOD PRESSURE: 83 MMHG | OXYGEN SATURATION: 100 % | HEIGHT: 65 IN

## 2025-01-13 DIAGNOSIS — M75.02 ADHESIVE CAPSULITIS OF LEFT SHOULDER: Primary | ICD-10-CM

## 2025-01-13 PROCEDURE — 6360000002 HC RX W HCPCS: Performed by: ANESTHESIOLOGY

## 2025-01-13 PROCEDURE — 2500000003 HC RX 250 WO HCPCS: Performed by: ANESTHESIOLOGY

## 2025-01-13 PROCEDURE — 7100000001 HC PACU RECOVERY - ADDTL 15 MIN: Performed by: ORTHOPAEDIC SURGERY

## 2025-01-13 PROCEDURE — 7100000010 HC PHASE II RECOVERY - FIRST 15 MIN: Performed by: ORTHOPAEDIC SURGERY

## 2025-01-13 PROCEDURE — 64415 NJX AA&/STRD BRCH PLXS IMG: CPT

## 2025-01-13 PROCEDURE — 7100000011 HC PHASE II RECOVERY - ADDTL 15 MIN: Performed by: ORTHOPAEDIC SURGERY

## 2025-01-13 PROCEDURE — 3600000101 HC MANIP SHOULDER UNDER ANESTHESIA: Performed by: ORTHOPAEDIC SURGERY

## 2025-01-13 PROCEDURE — 2580000003 HC RX 258: Performed by: ANESTHESIOLOGY

## 2025-01-13 PROCEDURE — 6360000002 HC RX W HCPCS

## 2025-01-13 PROCEDURE — 7100000000 HC PACU RECOVERY - FIRST 15 MIN: Performed by: ORTHOPAEDIC SURGERY

## 2025-01-13 PROCEDURE — 2580000003 HC RX 258

## 2025-01-13 PROCEDURE — 3700000000 HC ANESTHESIA ATTENDED CARE: Performed by: ORTHOPAEDIC SURGERY

## 2025-01-13 PROCEDURE — 6360000002 HC RX W HCPCS: Performed by: ORTHOPAEDIC SURGERY

## 2025-01-13 PROCEDURE — 2709999900 HC NON-CHARGEABLE SUPPLY: Performed by: ORTHOPAEDIC SURGERY

## 2025-01-13 RX ORDER — HYDROMORPHONE HYDROCHLORIDE 1 MG/ML
0.5 INJECTION, SOLUTION INTRAMUSCULAR; INTRAVENOUS; SUBCUTANEOUS EVERY 5 MIN PRN
Status: CANCELLED | OUTPATIENT
Start: 2025-01-13

## 2025-01-13 RX ORDER — SODIUM CHLORIDE, SODIUM LACTATE, POTASSIUM CHLORIDE, CALCIUM CHLORIDE 600; 310; 30; 20 MG/100ML; MG/100ML; MG/100ML; MG/100ML
INJECTION, SOLUTION INTRAVENOUS
Status: DISCONTINUED | OUTPATIENT
Start: 2025-01-13 | End: 2025-01-13 | Stop reason: SDUPTHER

## 2025-01-13 RX ORDER — SODIUM CHLORIDE, SODIUM LACTATE, POTASSIUM CHLORIDE, CALCIUM CHLORIDE 600; 310; 30; 20 MG/100ML; MG/100ML; MG/100ML; MG/100ML
INJECTION, SOLUTION INTRAVENOUS CONTINUOUS
Status: CANCELLED | OUTPATIENT
Start: 2025-01-13

## 2025-01-13 RX ORDER — SODIUM CHLORIDE 0.9 % (FLUSH) 0.9 %
5-40 SYRINGE (ML) INJECTION EVERY 12 HOURS SCHEDULED
Status: CANCELLED | OUTPATIENT
Start: 2025-01-13

## 2025-01-13 RX ORDER — DEXAMETHASONE SODIUM PHOSPHATE 4 MG/ML
4 INJECTION, SOLUTION INTRA-ARTICULAR; INTRALESIONAL; INTRAMUSCULAR; INTRAVENOUS; SOFT TISSUE ONCE
Status: COMPLETED | OUTPATIENT
Start: 2025-01-13 | End: 2025-01-13

## 2025-01-13 RX ORDER — SODIUM CHLORIDE, SODIUM LACTATE, POTASSIUM CHLORIDE, CALCIUM CHLORIDE 600; 310; 30; 20 MG/100ML; MG/100ML; MG/100ML; MG/100ML
INJECTION, SOLUTION INTRAVENOUS CONTINUOUS
Status: DISCONTINUED | OUTPATIENT
Start: 2025-01-13 | End: 2025-01-13 | Stop reason: HOSPADM

## 2025-01-13 RX ORDER — METHYLPREDNISOLONE ACETATE 80 MG/ML
INJECTION, SUSPENSION INTRA-ARTICULAR; INTRALESIONAL; INTRAMUSCULAR; SOFT TISSUE PRN
Status: DISCONTINUED | OUTPATIENT
Start: 2025-01-13 | End: 2025-01-13 | Stop reason: ALTCHOICE

## 2025-01-13 RX ORDER — SODIUM CHLORIDE 9 MG/ML
INJECTION, SOLUTION INTRAVENOUS CONTINUOUS
Status: DISCONTINUED | OUTPATIENT
Start: 2025-01-13 | End: 2025-01-13 | Stop reason: HOSPADM

## 2025-01-13 RX ORDER — HYDROMORPHONE HYDROCHLORIDE 1 MG/ML
0.25 INJECTION, SOLUTION INTRAMUSCULAR; INTRAVENOUS; SUBCUTANEOUS EVERY 5 MIN PRN
Status: CANCELLED | OUTPATIENT
Start: 2025-01-13

## 2025-01-13 RX ORDER — SODIUM CHLORIDE 0.9 % (FLUSH) 0.9 %
5-40 SYRINGE (ML) INJECTION PRN
Status: DISCONTINUED | OUTPATIENT
Start: 2025-01-13 | End: 2025-01-13 | Stop reason: HOSPADM

## 2025-01-13 RX ORDER — PROPOFOL 10 MG/ML
INJECTION, EMULSION INTRAVENOUS
Status: DISCONTINUED | OUTPATIENT
Start: 2025-01-13 | End: 2025-01-13 | Stop reason: SDUPTHER

## 2025-01-13 RX ORDER — SODIUM CHLORIDE 0.9 % (FLUSH) 0.9 %
5-40 SYRINGE (ML) INJECTION EVERY 12 HOURS SCHEDULED
Status: DISCONTINUED | OUTPATIENT
Start: 2025-01-13 | End: 2025-01-13 | Stop reason: HOSPADM

## 2025-01-13 RX ORDER — ROPIVACAINE HYDROCHLORIDE 5 MG/ML
INJECTION, SOLUTION EPIDURAL; INFILTRATION; PERINEURAL
Status: COMPLETED | OUTPATIENT
Start: 2025-01-13 | End: 2025-01-13

## 2025-01-13 RX ORDER — ONDANSETRON 4 MG/1
4 TABLET, FILM COATED ORAL DAILY PRN
Qty: 20 TABLET | Refills: 0 | Status: SHIPPED | OUTPATIENT
Start: 2025-01-13

## 2025-01-13 RX ORDER — NALOXONE HYDROCHLORIDE 0.4 MG/ML
INJECTION, SOLUTION INTRAMUSCULAR; INTRAVENOUS; SUBCUTANEOUS PRN
Status: CANCELLED | OUTPATIENT
Start: 2025-01-13

## 2025-01-13 RX ORDER — ROPIVACAINE HYDROCHLORIDE 2 MG/ML
INJECTION, SOLUTION EPIDURAL; INFILTRATION; PERINEURAL PRN
Status: DISCONTINUED | OUTPATIENT
Start: 2025-01-13 | End: 2025-01-13 | Stop reason: ALTCHOICE

## 2025-01-13 RX ORDER — ROPIVACAINE HYDROCHLORIDE 5 MG/ML
30 INJECTION, SOLUTION EPIDURAL; INFILTRATION; PERINEURAL ONCE
Status: DISCONTINUED | OUTPATIENT
Start: 2025-01-13 | End: 2025-01-13 | Stop reason: HOSPADM

## 2025-01-13 RX ORDER — SODIUM CHLORIDE 0.9 % (FLUSH) 0.9 %
5-40 SYRINGE (ML) INJECTION PRN
Status: CANCELLED | OUTPATIENT
Start: 2025-01-13

## 2025-01-13 RX ORDER — MIDAZOLAM HYDROCHLORIDE 1 MG/ML
2 INJECTION, SOLUTION INTRAMUSCULAR; INTRAVENOUS ONCE
Status: COMPLETED | OUTPATIENT
Start: 2025-01-13 | End: 2025-01-13

## 2025-01-13 RX ORDER — HYDROCODONE BITARTRATE AND ACETAMINOPHEN 10; 325 MG/1; MG/1
TABLET ORAL
Qty: 20 TABLET | Refills: 0 | Status: SHIPPED | OUTPATIENT
Start: 2025-01-13 | End: 2025-01-20

## 2025-01-13 RX ORDER — ONDANSETRON 2 MG/ML
4 INJECTION INTRAMUSCULAR; INTRAVENOUS
Status: CANCELLED | OUTPATIENT
Start: 2025-01-13 | End: 2025-01-14

## 2025-01-13 RX ORDER — SODIUM CHLORIDE 9 MG/ML
INJECTION, SOLUTION INTRAVENOUS PRN
Status: DISCONTINUED | OUTPATIENT
Start: 2025-01-13 | End: 2025-01-13 | Stop reason: HOSPADM

## 2025-01-13 RX ORDER — SODIUM CHLORIDE 9 MG/ML
INJECTION, SOLUTION INTRAVENOUS PRN
Status: CANCELLED | OUTPATIENT
Start: 2025-01-13

## 2025-01-13 RX ORDER — LIDOCAINE HYDROCHLORIDE 10 MG/ML
INJECTION, SOLUTION INFILTRATION; PERINEURAL
Status: DISCONTINUED | OUTPATIENT
Start: 2025-01-13 | End: 2025-01-13 | Stop reason: SDUPTHER

## 2025-01-13 RX ADMIN — MIDAZOLAM 2 MG: 1 INJECTION INTRAMUSCULAR; INTRAVENOUS at 06:52

## 2025-01-13 RX ADMIN — SODIUM CHLORIDE, PRESERVATIVE FREE 20 MG: 5 INJECTION INTRAVENOUS at 06:41

## 2025-01-13 RX ADMIN — DEXAMETHASONE SODIUM PHOSPHATE 4 MG: 4 INJECTION INTRA-ARTICULAR; INTRALESIONAL; INTRAMUSCULAR; INTRAVENOUS; SOFT TISSUE at 06:41

## 2025-01-13 RX ADMIN — ROPIVACAINE HYDROCHLORIDE 20 ML: 5 INJECTION, SOLUTION EPIDURAL; INFILTRATION; PERINEURAL at 06:53

## 2025-01-13 RX ADMIN — LIDOCAINE HYDROCHLORIDE 50 MG: 10 INJECTION, SOLUTION INFILTRATION; PERINEURAL at 07:24

## 2025-01-13 RX ADMIN — PROPOFOL 40 MG: 10 INJECTION, EMULSION INTRAVENOUS at 07:24

## 2025-01-13 RX ADMIN — SODIUM CHLORIDE: 9 INJECTION, SOLUTION INTRAVENOUS at 06:06

## 2025-01-13 RX ADMIN — SODIUM CHLORIDE, SODIUM LACTATE, POTASSIUM CHLORIDE, AND CALCIUM CHLORIDE: 600; 310; 30; 20 INJECTION, SOLUTION INTRAVENOUS at 07:24

## 2025-01-13 ASSESSMENT — PAIN - FUNCTIONAL ASSESSMENT
PAIN_FUNCTIONAL_ASSESSMENT: 0-10
PAIN_FUNCTIONAL_ASSESSMENT: PREVENTS OR INTERFERES SOME ACTIVE ACTIVITIES AND ADLS
PAIN_FUNCTIONAL_ASSESSMENT: NONE - DENIES PAIN
PAIN_FUNCTIONAL_ASSESSMENT: NONE - DENIES PAIN

## 2025-01-13 ASSESSMENT — LIFESTYLE VARIABLES: SMOKING_STATUS: 0

## 2025-01-13 ASSESSMENT — PAIN DESCRIPTION - DESCRIPTORS: DESCRIPTORS: ACHING

## 2025-01-13 NOTE — ANESTHESIA PRE PROCEDURE
Department of Anesthesiology  Preprocedure Note       Name:  Christa Phillip   Age:  49 y.o.  :  1975                                          MRN:  [id number]         Date:  2025      Surgeon: Surgeon(s):  Aubrey Thompson MD    Procedure: Procedure(s):  LEFT SHOULDER MANIPULATION UNDER ANESTHESIA    Medications prior to admission:   Prior to Admission medications    Medication Sig Start Date End Date Taking? Authorizing Provider   celecoxib (CELEBREX) 100 MG capsule Take 1 capsule by mouth daily   Yes Mayra Bauman MD   DULoxetine (CYMBALTA) 60 MG extended release capsule Take 1 capsule by mouth daily 24  Yes Jocelynn Turcios APRN - CNP   PARoxetine (PAXIL CR) 25 MG extended release tablet Take 1 tablet by mouth daily 24  Yes Jocelynn Turcios APRN - CNP   Estradiol (DIVIGEL) 0.5 MG/0.5GM GEL Place 0.5 mg onto the skin daily 24  Yes Krystin Ramirez APRN   VITAMIN D, ERGOCALCIFEROL, PO Take 5,000 mg by mouth daily   Yes Mayra Bauman MD   loratadine-pseudoephedrine (ALLERGY RELIEF D-24)  MG per extended release tablet Take 1 tablet by mouth daily  Patient taking differently: Take 1 tablet by mouth daily prn 11/10/23  Yes Marily Lu APRN - CNM   famotidine (PEPCID) 20 MG tablet Take 1 tablet by mouth 2 times daily  Patient taking differently: Take 1 tablet by mouth 2 times daily Taking daily 3/23/23  Yes Shani Suazo MD   albuterol sulfate HFA (PROVENTIL HFA) 108 (90 Base) MCG/ACT inhaler Inhale 2 puffs into the lungs every 6 hours as needed for Wheezing 11/3/21  Yes Larisa Cabral MD   azelastine (ASTELIN) 0.1 % nasal spray 1 spray by Nasal route 2 times daily Use in each nostril as directed 21  Yes Pako Little MD   fluticasone (FLONASE) 50 MCG/ACT nasal spray 2 sprays by Nasal route daily 21  Yes Brenda Issa PA-C   Omega-3 Fatty Acids (FISH OIL) 1000 MG CPDR Take 3 capsules by mouth daily When patient remembers   Yes Mayra Bauman MD

## 2025-01-13 NOTE — OP NOTE
Operative Note      Patient: Christa Phillip  YOB: 1975  MRN: [id number]    Date of Procedure: 1/13/2025    PREOPERATIVE DIAGNOSIS:   1. LEFT SHOULDER ADHESIVE CAPSULITIS  M75.02     POSTOPERATIVE DIAGNOSIS:   1. LEFT SHOULDER ADHESIVE CAPSULITIS    PROCEDURE:  Procedure(s):  1.) LEFT SHOULDER MANIPULATION UNDER ANESTHESIA    2.) LEFT SHOULDER CORTICOSTEROID INJECTION OF 80mg of DEPOMEDROL WITH 8cc of 0.2% NAROPIN     SURGEON:  Aubrey Thompson MD    ASSISTANT: none    ANESTHESIA:  Monitor Anesthesia Care    ESTIMATED BLOOD LOSS:  none    COMPLICATIONS:  None.    CONDITION:  Stable.    INDICATIONS:  49 year-old female presents with left shoulder adhesive capsulitis.  The patient demonstrates a decreased PROM with pain and stiffness.  The patient has tried a HEP, NSAIDs, activity modifications and time.  The patient also complains of left knee pain and would like an injection while under anesthesia.      DESCRIPTION OF PROCEDURE   The patient was interviewed in the preanesthesia area where her operative shoulder  was marked with a marking pen. She was then taken to the operative suite  where monitored anesthesia care was provided by the anesthesia team. A time  out was then called confirming the patient and the operative site as well as  the planned procedure.     A gentle manipulation was then performed. Initially the patients arm was  forward flexed to 175 degrees. She was then abducted to 90 degrees and gently externally rotated to 105 degrees. With pressure over the anterior aspect of the shoulder she was then internally rotated to 60 degrees. The  arm was then adducted across the body. It was then placed to her side and  externally rotated to 45 degrees. These maneuvers were then repeated several times. The patient did have an improved supple range of motion at the completion of manipulation.  Patient did have a very supple release with full arc of motion at the completion of procedure.    The area just

## 2025-01-13 NOTE — ANESTHESIA PROCEDURE NOTES
Peripheral Block    Patient location during procedure: holding area  Reason for block: post-op pain management  Start time: 1/13/2025 6:53 AM  End time: 1/13/2025 6:55 AM  Staffing  Performed: anesthesiologist   Anesthesiologist: Florentin Lujan MD  Performed by: Florentin Lujan MD  Authorized by: Florentin Lujan MD    Preanesthetic Checklist  Completed: patient identified, IV checked, site marked, risks and benefits discussed, surgical/procedural consents, equipment checked, pre-op evaluation, timeout performed, anesthesia consent given, oxygen available, monitors applied/VS acknowledged, fire risk safety assessment completed and verbalized and blood product R/B/A discussed and consented  Peripheral Block   Patient position: supine  Prep: ChloraPrep  Provider prep: mask and sterile gloves  Patient monitoring: continuous pulse ox and frequent blood pressure checks  Block type: Brachial plexus  Interscalene  Laterality: left  Injection technique: single-shot  Guidance: ultrasound guided  Local infiltration: lidocaine  Local infiltration: lidocaine    Needle   Needle type: Quincke   Needle gauge: 20 G  Needle localization: ultrasound guidance  Needle length: 10 cm  Assessment   Injection assessment: negative aspiration for heme, no paresthesia on injection, local visualized surrounding nerve on ultrasound and no intravascular symptoms  Paresthesia pain: none  Slow fractionated injection: yes  Hemodynamics: stable  Outcomes: uncomplicated and patient tolerated procedure well    Medications Administered  ropivacaine (NAROPIN) injection 0.5% - Perineural   20 mL - 1/13/2025 6:53:00 AM

## 2025-01-13 NOTE — DISCHARGE INSTRUCTIONS
1. Begin physical therapy 1-2 days post op.  2. Ice shoulder 20 min on and 20 min off as needed.  3. Weight bear as tolerated on operative shoulder after the nerve block wears off.  4. Use operative shoulder as tolerated for the nerve block wears off.  5. Use the sling until the nerve block wears off.  6. Norco as prescribed for pain.  7. Follow-up at the [facility] as scheduled in 2 weeks.  8. [facility] with any questions.

## 2025-01-13 NOTE — H&P
Pt Name: Christa Phillip  MRN: [id number]  YOB: 1975  Date: 1/13/2025      HPI: 48 yo female presents for a left shoulder MYRTLE.      Past Medical/Surgical History:   Past Medical History:   Diagnosis Date    Anxiety     Asthma     Depression 01/13/2019    Hypothyroid 06/24/2013    Thyroid disorder     Yeast infection of nipple, postpartum 03/20/2014     Past Surgical History:   Procedure Laterality Date    COLONOSCOPY N/A 03/16/2023    Dr SMITA Oviedo-Normal, 10 yr recall    HYSTERECTOMY (CERVIX STATUS UNKNOWN)      LAPAROSCOPY  06/11/2015    LIGAMENT REPAIR Right 12/29/2015    RIGHT ANKLE BROSTROM-LENTZ  performed by Severo Varghese MD at [facility] OR    PARTIAL HYSTERECTOMY (CERVIX NOT REMOVED)  07/13/2015    [facility]    TUBAL LIGATION  03/2014    TUBAL LIGATION      US BREAST BIOPSY W LOC DEVICE 1ST LESION LEFT Left 04/06/2022    US BREAST NEEDLE BIOPSY LEFT 4/6/2022 [facility] SUR[facility]ERY    WISDOM TOOTH EXTRACTION  2011         Social History:   Social History     Socioeconomic History    Marital status: Legally      Spouse name: Not on file    Number of children: Not on file    Years of education: Not on file    Highest education level: Not on file   Occupational History    Not on file   Tobacco Use    Smoking status: Never    Smokeless tobacco: Never   Vaping Use    Vaping status: Never Used   Substance and Sexual Activity    Alcohol use: No    Drug use: No    Sexual activity: Yes     Partners: Male   Other Topics Concern    Not on file   Social History Narrative    Not on file     Social Determinants of Health     Financial Resource Strain: Low Risk  (4/16/2024)    Overall Financial Resource Strain (CARDIA)     Difficulty of Paying Living Expenses: Not hard at all   Food Insecurity: No Food Insecurity (4/16/2024)    Hunger Vital Sign     Worried About Running Out of Food in the Last Year: Never true     Ran Out of Food in the Last Year: Never true   Transportation Needs: Unknown (4/16/2024)    PRAPARE -

## 2025-01-13 NOTE — ANESTHESIA POSTPROCEDURE EVALUATION
Department of Anesthesiology  Postprocedure Note    Patient: Christa Phillip  MRN: [id number]  YOB: 1975  Date of evaluation: 1/13/2025    Procedure Summary       Date: 01/13/25 Room / Location: PACU [address]    Anesthesia Start: 0719 Anesthesia Stop:     Procedure: LEFT SHOULDER MANIPULATION UNDER ANESTHESIA (Left: Shoulder) Diagnosis:       Adhesive capsulitis of left shoulder      (Adhesive capsulitis of left shoulder [M75.02])    Surgeons: Aubrey Thompson MD Responsible Provider: Florentin Rodriguez APRN - CRNA    Anesthesia Type: MAC ASA Status: 2            Anesthesia Type: MAC    Jazmin Phase I: Jazmin Score: 10    Jazmin Phase II:      Anesthesia Post Evaluation    Patient location during evaluation: PACU  Patient participation: complete - patient participated  Level of consciousness: awake and alert  Pain score: 0  Airway patency: patent  Nausea & Vomiting: no vomiting and no nausea  Cardiovascular status: blood pressure returned to baseline and hemodynamically stable  Respiratory status: spontaneous ventilation, room air, nonlabored ventilation and acceptable  Hydration status: euvolemic  Pain management: adequate    No notable events documented.

## 2025-01-14 DIAGNOSIS — R52 PAIN: Primary | ICD-10-CM

## 2025-01-14 RX ORDER — OXYCODONE AND ACETAMINOPHEN 7.5; 325 MG/1; MG/1
1 TABLET ORAL
Qty: 28 TABLET | Refills: 0 | Status: SHIPPED | OUTPATIENT
Start: 2025-01-14 | End: 2025-01-21

## 2025-01-14 NOTE — TELEPHONE ENCOUNTER
----- Message from Kym ALCALA sent at 1/14/2025  7:55 AM CST -----  Regarding: [facility]  [facility]    Reason for Message: Dr. Thompson Pt.   Aby is requesting a different pain medication due to possible side effects.    Additional Information: Pt had a shoulder manipulation yesterday. Edgar is complaining that the pain medication did not work and she is in a lot of pain. She stated that she was up all night. Her face is flushed, and she is itching all over (pt thinks she may need a different medication)?    Call Back Number: [phone]  Caller Relationship: Patient  Caller Name: EDGAR YAO [[id number]]

## 2025-01-14 NOTE — TELEPHONE ENCOUNTER
Returned call to patient.Patient reported after taking the Norco patient developed rash , started itching and face became severely red. Patient has tried percocet in the past without any reaction. Patient instructed to take benadryl OTC 25mg 1-2 as directed to assist with side effect of Norco. Patient verbalized understanding.

## 2025-01-21 ENCOUNTER — TELEPHONE (OUTPATIENT)
Age: 50
End: 2025-01-21

## 2025-01-21 NOTE — TELEPHONE ENCOUNTER
Patient would like to know if she needs to come in for a follow up appt after her manipulation. Please call regarding this.

## 2025-03-04 DIAGNOSIS — M62.838 MUSCLE SPASM OF SHOULDER REGION: Primary | ICD-10-CM

## 2025-03-04 RX ORDER — CYCLOBENZAPRINE HCL 10 MG
10 TABLET ORAL 3 TIMES DAILY PRN
Qty: 45 TABLET | Refills: 0 | Status: SHIPPED | OUTPATIENT
Start: 2025-03-04 | End: 2025-03-19

## 2025-03-11 DIAGNOSIS — F33.9 RECURRENT MAJOR DEPRESSIVE DISORDER, REMISSION STATUS UNSPECIFIED: ICD-10-CM

## 2025-03-11 DIAGNOSIS — M79.7 FIBROMYALGIA: ICD-10-CM

## 2025-03-11 RX ORDER — PAROXETINE HYDROCHLORIDE HEMIHYDRATE 25 MG/1
25 TABLET, FILM COATED, EXTENDED RELEASE ORAL DAILY
Qty: 14 TABLET | Refills: 0 | Status: SHIPPED | OUTPATIENT
Start: 2025-03-11

## 2025-03-11 RX ORDER — DULOXETIN HYDROCHLORIDE 60 MG/1
60 CAPSULE, DELAYED RELEASE ORAL DAILY
Qty: 14 CAPSULE | Refills: 0 | Status: SHIPPED | OUTPATIENT
Start: 2025-03-11

## 2025-04-02 ENCOUNTER — CLINICAL DOCUMENTATION (OUTPATIENT)
Dept: PHARMACY | Facility: CLINIC | Age: 50
End: 2025-04-02

## 2025-04-02 DIAGNOSIS — M79.7 FIBROMYALGIA: ICD-10-CM

## 2025-04-02 DIAGNOSIS — F33.9 RECURRENT MAJOR DEPRESSIVE DISORDER, REMISSION STATUS UNSPECIFIED: ICD-10-CM

## 2025-04-02 RX ORDER — DULOXETIN HYDROCHLORIDE 60 MG/1
60 CAPSULE, DELAYED RELEASE ORAL DAILY
Qty: 14 CAPSULE | Refills: 0 | Status: SHIPPED | OUTPATIENT
Start: 2025-04-02

## 2025-04-02 RX ORDER — PAROXETINE HYDROCHLORIDE HEMIHYDRATE 25 MG/1
25 TABLET, FILM COATED, EXTENDED RELEASE ORAL DAILY
Qty: 14 TABLET | Refills: 0 | Status: SHIPPED | OUTPATIENT
Start: 2025-04-02

## 2025-04-02 NOTE — PROGRESS NOTES
**Patient is [facility]**  Pharmacy Pop Care Documentation:   Patient is missing the following requirements: DX: DM Type One or Type Two; Provider Documentation of DM Visit; A1C.  If completed patient will be eligible for enrollment in the DM Program.    Application Received: via [employer].    Following e-mail sent to patient at [email or web address] - e-mail provided on the application:    Thanks so much for taking the first step towards better health.     This e-mail is to inform you that we have received your enrollment form for the [facility] Be Well With Diabetes Program, but you are missing the following requirements or documentation:     Diagnosis of Diabetes Type One or Two - as per documentation from your Provider  2.   Provider Visit for Diabetes within the last 12 months  3.   A1C Result within the last 12 months     To qualify for this program the above requirements must be met to complete your enrollment into the program.  Results or visits obtained outside of [facility] will need to be provided by fax: [phone] or email: [email or web address] to qualify for the program.     If requirements are not met, you will be not be enrolled in the program.       [facility]  Phone: toll free [phone] Option #3  Email: [email or web address]  Fax Number: [phone]    Shazia Espinoza CphT  [facility]  Clinical Pharmacy   Department, toll free: [phone] Option #3

## 2025-04-07 NOTE — PROGRESS NOTES
Documentation to complete patients' enrollment into the DM Program not received.  Information from e-mail mailed to patient in a letter.    No further patient outreach planned at this time.    Shazia Espinoza [facility]   [facility] Clinical Pharmacy  Department, toll free: [phone], option 3    For Pharmacy Admin Tracking Only    Program: Stretchr  CPA in place:  No  Gap Closed?: No   Time Spent (min): 5

## 2025-04-08 LAB
CHOLEST SERPL-MCNC: 199 MG/DL (ref 0–199)
GLUCOSE SERPL-MCNC: 87 MG/DL (ref 70–99)
HDLC SERPL-MCNC: 44 MG/DL (ref 40–60)
LDLC SERPL CALC-MCNC: 116 MG/DL
TRIGL SERPL-MCNC: 196 MG/DL (ref 0–149)

## 2025-04-21 ENCOUNTER — PATIENT MESSAGE (OUTPATIENT)
Dept: PRIMARY CARE CLINIC | Age: 50
End: 2025-04-21

## 2025-04-21 DIAGNOSIS — F33.9 RECURRENT MAJOR DEPRESSIVE DISORDER, REMISSION STATUS UNSPECIFIED: ICD-10-CM

## 2025-04-21 DIAGNOSIS — M79.7 FIBROMYALGIA: ICD-10-CM

## 2025-04-22 RX ORDER — DULOXETIN HYDROCHLORIDE 60 MG/1
60 CAPSULE, DELAYED RELEASE ORAL DAILY
Qty: 15 CAPSULE | Refills: 0 | Status: SHIPPED | OUTPATIENT
Start: 2025-04-22

## 2025-04-22 RX ORDER — PAROXETINE HYDROCHLORIDE HEMIHYDRATE 25 MG/1
25 TABLET, FILM COATED, EXTENDED RELEASE ORAL DAILY
Qty: 15 TABLET | Refills: 0 | Status: SHIPPED | OUTPATIENT
Start: 2025-04-22

## 2025-04-22 NOTE — TELEPHONE ENCOUNTER
Christa Phillip called to request a refill on her medication.      Last office visit : 11/26/2024   Next office visit : 5/6/2025     Requested Prescriptions     Pending Prescriptions Disp Refills    DULoxetine (CYMBALTA) 60 MG extended release capsule 14 capsule 0     Sig: Take 1 capsule by mouth daily NO FURTHER REFILLS UNTIL SEEN IN OFFICE.    PARoxetine (PAXIL CR) 25 MG extended release tablet 14 tablet 0     Sig: Take 1 tablet by mouth daily NO FURTHER REFILLS UNTIL SEEN IN OFFICE.            Kusum Smith LPN

## 2025-05-01 ENCOUNTER — OFFICE VISIT (OUTPATIENT)
Age: 50
End: 2025-05-01

## 2025-05-01 VITALS
TEMPERATURE: 97.6 F | SYSTOLIC BLOOD PRESSURE: 120 MMHG | OXYGEN SATURATION: 99 % | BODY MASS INDEX: 31.99 KG/M2 | WEIGHT: 192 LBS | HEART RATE: 86 BPM | HEIGHT: 65 IN | DIASTOLIC BLOOD PRESSURE: 74 MMHG | RESPIRATION RATE: 20 BRPM

## 2025-05-01 DIAGNOSIS — J03.90 ACUTE TONSILLITIS, UNSPECIFIED ETIOLOGY: Primary | ICD-10-CM

## 2025-05-01 DIAGNOSIS — H66.93 BILATERAL ACUTE OTITIS MEDIA: ICD-10-CM

## 2025-05-01 ASSESSMENT — ENCOUNTER SYMPTOMS
SHORTNESS OF BREATH: 0
COUGH: 0
SORE THROAT: 1

## 2025-05-01 NOTE — PATIENT INSTRUCTIONS
- Augmentin sent to pharmacy; take full course of antibiotic even if symptoms improve.   - Work note provided  - Recommend OTC antihistamine, such as Claritin or Zyrtec  - Monitor for fever and treat as needed with tylenol or ibuprofen, unless contraindicated.   - Recommend throat lozenges, chloraseptic sprays, honey, or salt water gargles, as needed for sore throat   - Increase fluids as tolerated.   - Replace toothbrush 48 hours after starting antibiotic   - The patient is to follow up with PCP or return to clinic if symptoms worsen/fail to improve.

## 2025-05-01 NOTE — PROGRESS NOTES
[facility] URGENT CARE, [facility] ([place])  [facility] URGENT CARE  [address].  [facility] [address]  Dept: [phone]  Dept Fax: [phone]    Christa Phillip is a 50 y.o. female who presents today for her medical conditions/complaints as noted below.  Christa Phillip is complaining of Pharyngitis and Ear Pain (Rt ear )      HPI:     Christa Phillip presents to the clinic for evaluation of right otalgia and sore throat. Symptoms began today. 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  No OTC medication reported.  Denies fever.  Denies recent antibiotic usage.      Pharyngitis  Associated symptoms: ear pain (right) and sore throat    Associated symptoms: no chest pain, no congestion, no cough, no fatigue, no fever and no shortness of breath    Ear Pain   Associated symptoms include a sore throat. Pertinent negatives include no coughing.       Past Medical History:   Diagnosis Date    Anxiety     Asthma     Depression 01/13/2019    Hypothyroid 06/24/2013    Thyroid disorder     Yeast infection of nipple, postpartum 03/20/2014       Past Surgical History:   Procedure Laterality Date    COLONOSCOPY N/A 03/16/2023    Dr SMITA Oviedo-Normal, 10 yr recall    HYSTERECTOMY (CERVIX STATUS UNKNOWN)      LAPAROSCOPY  06/11/2015    LIGAMENT REPAIR Right 12/29/2015    RIGHT ANKLE BROSTROM-LENTZ  performed by Severo Varghese MD at [facility] OR    PARTIAL HYSTERECTOMY (CERVIX NOT REMOVED)  07/13/2015    [facility]    SHOULDER SURGERY Left 1/13/2025    LEFT SHOULDER MANIPULATION UNDER ANESTHESIA performed by Aubrey Thompson MD at [facility] OR    TUBAL LIGATION  03/2014    TUBAL LIGATION      US BREAST BIOPSY W LOC DEVICE 1ST LESION LEFT Left 04/06/2022    US BREAST NEEDLE BIOPSY LEFT 4/6/2022 [facility] SURGERY    WISDOM TOOTH EXTRACTION  2011       Family History   Problem Relation Age of Onset    Heart Disease Mother     Diabetes Mother     Cancer Father     High Cholesterol Father        Social History     Tobacco Use

## 2025-05-06 ENCOUNTER — OFFICE VISIT (OUTPATIENT)
Dept: PRIMARY CARE CLINIC | Age: 50
End: 2025-05-06
Payer: COMMERCIAL

## 2025-05-06 VITALS
OXYGEN SATURATION: 99 % | BODY MASS INDEX: 31.79 KG/M2 | TEMPERATURE: 98 F | SYSTOLIC BLOOD PRESSURE: 120 MMHG | WEIGHT: 190.8 LBS | HEIGHT: 65 IN | HEART RATE: 93 BPM | DIASTOLIC BLOOD PRESSURE: 78 MMHG

## 2025-05-06 DIAGNOSIS — G44.209 TENSION HEADACHE: ICD-10-CM

## 2025-05-06 DIAGNOSIS — E78.1 HYPERTRIGLYCERIDEMIA: ICD-10-CM

## 2025-05-06 DIAGNOSIS — E78.2 MIXED HYPERLIPIDEMIA: ICD-10-CM

## 2025-05-06 DIAGNOSIS — M79.7 FIBROMYALGIA: ICD-10-CM

## 2025-05-06 DIAGNOSIS — Z12.31 ENCOUNTER FOR SCREENING MAMMOGRAM FOR MALIGNANT NEOPLASM OF BREAST: ICD-10-CM

## 2025-05-06 DIAGNOSIS — M76.61 ACHILLES TENDINITIS OF RIGHT LOWER EXTREMITY: ICD-10-CM

## 2025-05-06 DIAGNOSIS — F33.9 RECURRENT MAJOR DEPRESSIVE DISORDER, REMISSION STATUS UNSPECIFIED: ICD-10-CM

## 2025-05-06 DIAGNOSIS — Z00.00 ENCOUNTER FOR WELL ADULT EXAM WITHOUT ABNORMAL FINDINGS: Primary | ICD-10-CM

## 2025-05-06 PROCEDURE — 99396 PREV VISIT EST AGE 40-64: CPT | Performed by: NURSE PRACTITIONER

## 2025-05-06 PROCEDURE — 99213 OFFICE O/P EST LOW 20 MIN: CPT | Performed by: NURSE PRACTITIONER

## 2025-05-06 RX ORDER — SIMVASTATIN 10 MG
10 TABLET ORAL NIGHTLY
Qty: 90 TABLET | Refills: 1 | Status: SHIPPED | OUTPATIENT
Start: 2025-05-06

## 2025-05-06 RX ORDER — PAROXETINE HYDROCHLORIDE HEMIHYDRATE 25 MG/1
25 TABLET, FILM COATED, EXTENDED RELEASE ORAL DAILY
Qty: 90 TABLET | Refills: 1 | Status: SHIPPED | OUTPATIENT
Start: 2025-05-06

## 2025-05-06 RX ORDER — FENOFIBRATE 160 MG/1
160 TABLET ORAL DAILY
Qty: 90 TABLET | Refills: 1 | Status: SHIPPED | OUTPATIENT
Start: 2025-05-06

## 2025-05-06 RX ORDER — DULOXETIN HYDROCHLORIDE 60 MG/1
60 CAPSULE, DELAYED RELEASE ORAL DAILY
Qty: 90 CAPSULE | Refills: 1 | Status: SHIPPED | OUTPATIENT
Start: 2025-05-06

## 2025-05-06 SDOH — ECONOMIC STABILITY: FOOD INSECURITY: WITHIN THE PAST 12 MONTHS, THE FOOD YOU BOUGHT JUST DIDN'T LAST AND YOU DIDN'T HAVE MONEY TO GET MORE.: NEVER TRUE

## 2025-05-06 SDOH — ECONOMIC STABILITY: FOOD INSECURITY: WITHIN THE PAST 12 MONTHS, YOU WORRIED THAT YOUR FOOD WOULD RUN OUT BEFORE YOU GOT MONEY TO BUY MORE.: NEVER TRUE

## 2025-05-06 ASSESSMENT — ENCOUNTER SYMPTOMS
SORE THROAT: 0
NAUSEA: 0
COLOR CHANGE: 0
CHEST TIGHTNESS: 0
VOMITING: 0
DIARRHEA: 0
SHORTNESS OF BREATH: 0
COUGH: 0
ABDOMINAL PAIN: 0

## 2025-05-06 NOTE — PROGRESS NOTES
Well Adult Note  Name: Christa Phillip Today’s Date: 2025   MRN: [id number] Sex: Female   Age: 50 y.o. Ethnicity: Non- / Non    : 1975 Race: White (non-)      Christa Phillip is here for a well adult exam.          Assessment & Plan  1.  Adult physical exam    2-3. Elevated triglycerides, mixed hyperlipidemia.  - Triglyceride levels have shown a decrease but remain elevated.  - Genetic predisposition is likely contributing to this condition.  - Despite lifestyle modifications, the current progress is insufficient to significantly reduce risk.  - Fenofibrate will be prescribed to help lower triglyceride levels. Simvastatin will also be continued. Fasting labs will be conducted prior to the next visit to assess the effectiveness of the current medication regimen.    4.  Depression  - Continue Cymbalta and paroxetine  - Report any concerns    5.  Fibromyalgia  - The patient reports intermittent joint pain and swelling, particularly in the toes, feet, and elbows.  - Previous rheumatology consultations were inconclusive.  - Advised to schedule an appointment with Dr. Sood for further evaluation and management of joint pain.    6. Headaches.  - The patient experiences recurrent headaches, which may be related to high blood pressure or other factors.  - Maxalt has been prescribed previously but has not been used due to concerns about side effects.  - Advised to try Maxalt for headache relief and monitor for any side effects.    7. Achilles tendinitis.  - The patient presents with a red spot on the ankle, which may be indicative of Achilles tendinitis.  - Cymbalta, which the patient has been taking for approximately a year, could potentially cause Achilles tendinitis.  - Advised to apply topical treatments such as Blue-Emu, Biofreeze, or Voltaren gel to the affected area and avoid excessive activity for a few weeks to allow the condition to improve.  - Follow-up in office with any continued

## 2025-05-07 DIAGNOSIS — G47.30 SLEEP APNEA, UNSPECIFIED TYPE: Primary | ICD-10-CM

## 2025-08-05 DIAGNOSIS — E78.2 MIXED HYPERLIPIDEMIA: ICD-10-CM

## 2025-08-05 LAB
CHOLEST SERPL-MCNC: 136 MG/DL (ref 0–199)
HDLC SERPL-MCNC: 49 MG/DL (ref 40–60)
LDLC SERPL CALC-MCNC: 51 MG/DL
TRIGL SERPL-MCNC: 179 MG/DL (ref 0–149)

## 2025-08-06 ENCOUNTER — OFFICE VISIT (OUTPATIENT)
Dept: PRIMARY CARE CLINIC | Age: 50
End: 2025-08-06
Payer: COMMERCIAL

## 2025-08-06 VITALS
SYSTOLIC BLOOD PRESSURE: 124 MMHG | HEIGHT: 65 IN | WEIGHT: 189.4 LBS | HEART RATE: 77 BPM | OXYGEN SATURATION: 99 % | TEMPERATURE: 97.6 F | DIASTOLIC BLOOD PRESSURE: 74 MMHG | BODY MASS INDEX: 31.56 KG/M2

## 2025-08-06 DIAGNOSIS — M75.02 ADHESIVE CAPSULITIS OF LEFT SHOULDER: ICD-10-CM

## 2025-08-06 DIAGNOSIS — F33.41 RECURRENT MAJOR DEPRESSIVE DISORDER, IN PARTIAL REMISSION: Primary | ICD-10-CM

## 2025-08-06 DIAGNOSIS — E78.2 MIXED HYPERLIPIDEMIA: ICD-10-CM

## 2025-08-06 DIAGNOSIS — Z12.31 ENCOUNTER FOR SCREENING MAMMOGRAM FOR MALIGNANT NEOPLASM OF BREAST: ICD-10-CM

## 2025-08-06 PROCEDURE — 99214 OFFICE O/P EST MOD 30 MIN: CPT | Performed by: NURSE PRACTITIONER

## 2025-08-12 ASSESSMENT — ENCOUNTER SYMPTOMS
SHORTNESS OF BREATH: 0
NAUSEA: 0
COLOR CHANGE: 0
ABDOMINAL PAIN: 0
VOMITING: 0
SORE THROAT: 0
DIARRHEA: 0
CHEST TIGHTNESS: 0
COUGH: 0

## 2025-08-13 ENCOUNTER — OFFICE VISIT (OUTPATIENT)
Dept: OBGYN CLINIC | Age: 50
End: 2025-08-13
Payer: COMMERCIAL

## 2025-08-13 ENCOUNTER — HOSPITAL ENCOUNTER (OUTPATIENT)
Dept: WOMENS IMAGING | Age: 50
Discharge: HOME | End: 2025-08-13
Payer: COMMERCIAL

## 2025-08-13 VITALS
DIASTOLIC BLOOD PRESSURE: 90 MMHG | SYSTOLIC BLOOD PRESSURE: 140 MMHG | HEIGHT: 65 IN | HEART RATE: 103 BPM | WEIGHT: 190 LBS | BODY MASS INDEX: 31.65 KG/M2

## 2025-08-13 DIAGNOSIS — Z12.31 ENCOUNTER FOR SCREENING MAMMOGRAM FOR MALIGNANT NEOPLASM OF BREAST: ICD-10-CM

## 2025-08-13 DIAGNOSIS — A63.0 GENITAL WARTS: Primary | ICD-10-CM

## 2025-08-13 PROCEDURE — 77063 BREAST TOMOSYNTHESIS BI: CPT

## 2025-08-13 PROCEDURE — 56501 DESTROY VULVA LESIONS SIM: CPT | Performed by: NURSE PRACTITIONER

## 2025-08-13 ASSESSMENT — ENCOUNTER SYMPTOMS
EYES NEGATIVE: 1
GASTROINTESTINAL NEGATIVE: 1
ALLERGIC/IMMUNOLOGIC NEGATIVE: 1
RESPIRATORY NEGATIVE: 1

## (undated) DEVICE — ENDO KIT,LOURDES HOSPITAL: Brand: MEDLINE INDUSTRIES, INC.

## (undated) DEVICE — STANDARD HYPODERMIC NEEDLE,POLYPROPYLENE HUB: Brand: MONOJECT

## (undated) DEVICE — CANNULA NSL AD TBNG L7FT PVC STR NONFLARED PRNG O2 DEL W STD

## (undated) DEVICE — HYPODERMIC SAFETY NEEDLE: Brand: MAGELLAN

## (undated) DEVICE — SINGLE PORT MANIFOLD: Brand: NEPTUNE 2

## (undated) DEVICE — UNDERGLOVE SURG SZ 8 FNGR THK0.21MIL GRN LTX BEAD CUF

## (undated) DEVICE — ARM SLING: Brand: DEROYAL

## (undated) DEVICE — CANNULA NSL AD L7FT DIV O2 CO2 W/ M LUERLOCK TRMPT CONN

## (undated) DEVICE — SYRINGE, LUER LOCK, 10ML: Brand: MEDLINE

## (undated) DEVICE — GLOVE SURG SZ 8 CRM LTX FREE POLYISOPRENE POLYMER BEAD ANTI